# Patient Record
Sex: FEMALE | Race: WHITE | NOT HISPANIC OR LATINO | Employment: OTHER | URBAN - METROPOLITAN AREA
[De-identification: names, ages, dates, MRNs, and addresses within clinical notes are randomized per-mention and may not be internally consistent; named-entity substitution may affect disease eponyms.]

---

## 2020-12-14 RX ORDER — GLYBURIDE 1.25 MG/1
1.25 TABLET ORAL 2 TIMES DAILY WITH MEALS
Status: ON HOLD | COMMUNITY
End: 2021-01-03

## 2020-12-14 RX ORDER — SIMVASTATIN 10 MG
10 TABLET ORAL
COMMUNITY
End: 2022-04-21

## 2020-12-14 RX ORDER — LISINOPRIL 40 MG/1
40 TABLET ORAL EVERY EVENING
COMMUNITY
End: 2021-10-21 | Stop reason: CLARIF

## 2020-12-14 RX ORDER — TIOTROPIUM BROMIDE AND OLODATEROL 3.124; 2.736 UG/1; UG/1
2 SPRAY, METERED RESPIRATORY (INHALATION) DAILY
COMMUNITY

## 2020-12-14 RX ORDER — METOPROLOL TARTRATE 50 MG/1
50 TABLET, FILM COATED ORAL EVERY 12 HOURS SCHEDULED
COMMUNITY

## 2020-12-14 RX ORDER — AMLODIPINE BESYLATE 5 MG/1
5 TABLET ORAL DAILY
COMMUNITY

## 2020-12-14 RX ORDER — ASPIRIN 81 MG/1
81 TABLET, CHEWABLE ORAL DAILY
COMMUNITY
End: 2021-03-10 | Stop reason: CLARIF

## 2020-12-17 ENCOUNTER — ANESTHESIA EVENT (OUTPATIENT)
Dept: PERIOP | Facility: HOSPITAL | Age: 80
DRG: 331 | End: 2020-12-17
Payer: MEDICARE

## 2020-12-18 ENCOUNTER — ANESTHESIA (OUTPATIENT)
Dept: PERIOP | Facility: HOSPITAL | Age: 80
DRG: 331 | End: 2020-12-18
Payer: MEDICARE

## 2020-12-18 ENCOUNTER — HOSPITAL ENCOUNTER (INPATIENT)
Facility: HOSPITAL | Age: 80
LOS: 3 days | Discharge: HOME | DRG: 331 | End: 2020-12-21
Attending: SURGERY | Admitting: SURGERY
Payer: MEDICARE

## 2020-12-18 VITALS — HEART RATE: 75 BPM

## 2020-12-18 DIAGNOSIS — E11.9 TYPE 2 DIABETES MELLITUS WITHOUT COMPLICATION, WITHOUT LONG-TERM CURRENT USE OF INSULIN (HCC): ICD-10-CM

## 2020-12-18 DIAGNOSIS — C18.7 MALIGNANT NEOPLASM OF SIGMOID COLON (HCC): ICD-10-CM

## 2020-12-18 DIAGNOSIS — C26.9 GI MALIGNANCY (HCC): Primary | ICD-10-CM

## 2020-12-18 PROBLEM — Z99.81 OXYGEN DEPENDENT: Status: ACTIVE | Noted: 2020-12-18

## 2020-12-18 PROBLEM — J44.9 CHRONIC OBSTRUCTIVE PULMONARY DISEASE (HCC): Status: ACTIVE | Noted: 2020-12-18

## 2020-12-18 PROBLEM — I10 HTN (HYPERTENSION): Status: ACTIVE | Noted: 2020-12-18

## 2020-12-18 PROBLEM — E78.5 HYPERLIPIDEMIA: Status: ACTIVE | Noted: 2020-12-18

## 2020-12-18 PROBLEM — I25.10 CAD (CORONARY ARTERY DISEASE): Status: ACTIVE | Noted: 2020-12-18

## 2020-12-18 PROBLEM — E03.9 HYPOTHYROIDISM: Status: ACTIVE | Noted: 2020-12-18

## 2020-12-18 LAB
ABO GROUP BLD: NORMAL
ABO GROUP BLD: NORMAL
ANION GAP SERPL CALCULATED.3IONS-SCNC: 6 MMOL/L (ref 4–13)
APTT PPP: 32 SECONDS (ref 23–37)
ATRIAL RATE: 71 BPM
ATRIAL RATE: 72 BPM
BLD GP AB SCN SERPL QL: NEGATIVE
BUN SERPL-MCNC: 13 MG/DL (ref 5–25)
CALCIUM SERPL-MCNC: 9.7 MG/DL (ref 8.3–10.1)
CHLORIDE SERPL-SCNC: 103 MMOL/L (ref 100–108)
CO2 SERPL-SCNC: 30 MMOL/L (ref 21–32)
CREAT SERPL-MCNC: 0.82 MG/DL (ref 0.6–1.3)
ERYTHROCYTE [DISTWIDTH] IN BLOOD BY AUTOMATED COUNT: 13.6 % (ref 11.6–15.1)
FLUAV RNA RESP QL NAA+PROBE: NEGATIVE
FLUBV RNA RESP QL NAA+PROBE: NEGATIVE
GFR SERPL CREATININE-BSD FRML MDRD: 68 ML/MIN/1.73SQ M
GLUCOSE P FAST SERPL-MCNC: 161 MG/DL (ref 65–99)
GLUCOSE SERPL-MCNC: 131 MG/DL (ref 65–140)
GLUCOSE SERPL-MCNC: 161 MG/DL (ref 65–140)
GLUCOSE SERPL-MCNC: 181 MG/DL (ref 65–140)
HCT VFR BLD AUTO: 43.7 % (ref 34.8–46.1)
HGB BLD-MCNC: 13.7 G/DL (ref 11.5–15.4)
INR PPP: 0.96 (ref 0.84–1.19)
MCH RBC QN AUTO: 27 PG (ref 26.8–34.3)
MCHC RBC AUTO-ENTMCNC: 31.4 G/DL (ref 31.4–37.4)
MCV RBC AUTO: 86 FL (ref 82–98)
PLATELET # BLD AUTO: 289 THOUSANDS/UL (ref 149–390)
PMV BLD AUTO: 10.4 FL (ref 8.9–12.7)
POTASSIUM SERPL-SCNC: 3.8 MMOL/L (ref 3.5–5.3)
PROTHROMBIN TIME: 12.8 SECONDS (ref 11.6–14.5)
QRS AXIS: 44 DEGREES
QRS AXIS: 46 DEGREES
QRSD INTERVAL: 80 MS
QRSD INTERVAL: 82 MS
QT INTERVAL: 428 MS
QT INTERVAL: 430 MS
QTC INTERVAL: 455 MS
QTC INTERVAL: 467 MS
RBC # BLD AUTO: 5.07 MILLION/UL (ref 3.81–5.12)
RH BLD: NEGATIVE
RH BLD: NEGATIVE
RSV RNA RESP QL NAA+PROBE: NEGATIVE
SARS-COV-2 RNA RESP QL NAA+PROBE: NEGATIVE
SODIUM SERPL-SCNC: 139 MMOL/L (ref 136–145)
SPECIMEN EXPIRATION DATE: NORMAL
T WAVE AXIS: 51 DEGREES
T WAVE AXIS: 65 DEGREES
VENTRICULAR RATE: 68 BPM
VENTRICULAR RATE: 71 BPM
WBC # BLD AUTO: 8 THOUSAND/UL (ref 4.31–10.16)

## 2020-12-18 PROCEDURE — 88342 IMHCHEM/IMCYTCHM 1ST ANTB: CPT | Performed by: PATHOLOGY

## 2020-12-18 PROCEDURE — 44207 L COLECTOMY/COLOPROCTOSTOMY: CPT | Performed by: PHYSICIAN ASSISTANT

## 2020-12-18 PROCEDURE — 86850 RBC ANTIBODY SCREEN: CPT | Performed by: STUDENT IN AN ORGANIZED HEALTH CARE EDUCATION/TRAINING PROGRAM

## 2020-12-18 PROCEDURE — 0241U HB NFCT DS VIR RESP RNA 4 TRGT: CPT | Performed by: STUDENT IN AN ORGANIZED HEALTH CARE EDUCATION/TRAINING PROGRAM

## 2020-12-18 PROCEDURE — 85730 THROMBOPLASTIN TIME PARTIAL: CPT | Performed by: STUDENT IN AN ORGANIZED HEALTH CARE EDUCATION/TRAINING PROGRAM

## 2020-12-18 PROCEDURE — 88341 IMHCHEM/IMCYTCHM EA ADD ANTB: CPT | Performed by: PATHOLOGY

## 2020-12-18 PROCEDURE — 93010 ELECTROCARDIOGRAM REPORT: CPT | Performed by: INTERNAL MEDICINE

## 2020-12-18 PROCEDURE — 93005 ELECTROCARDIOGRAM TRACING: CPT

## 2020-12-18 PROCEDURE — 85610 PROTHROMBIN TIME: CPT | Performed by: STUDENT IN AN ORGANIZED HEALTH CARE EDUCATION/TRAINING PROGRAM

## 2020-12-18 PROCEDURE — 82948 REAGENT STRIP/BLOOD GLUCOSE: CPT

## 2020-12-18 PROCEDURE — 80048 BASIC METABOLIC PNL TOTAL CA: CPT | Performed by: STUDENT IN AN ORGANIZED HEALTH CARE EDUCATION/TRAINING PROGRAM

## 2020-12-18 PROCEDURE — 88309 TISSUE EXAM BY PATHOLOGIST: CPT | Performed by: PATHOLOGY

## 2020-12-18 PROCEDURE — 0DTG4ZZ RESECTION OF LEFT LARGE INTESTINE, PERCUTANEOUS ENDOSCOPIC APPROACH: ICD-10-PCS | Performed by: SURGERY

## 2020-12-18 PROCEDURE — 86901 BLOOD TYPING SEROLOGIC RH(D): CPT | Performed by: STUDENT IN AN ORGANIZED HEALTH CARE EDUCATION/TRAINING PROGRAM

## 2020-12-18 PROCEDURE — 86900 BLOOD TYPING SEROLOGIC ABO: CPT | Performed by: STUDENT IN AN ORGANIZED HEALTH CARE EDUCATION/TRAINING PROGRAM

## 2020-12-18 PROCEDURE — 85027 COMPLETE CBC AUTOMATED: CPT | Performed by: STUDENT IN AN ORGANIZED HEALTH CARE EDUCATION/TRAINING PROGRAM

## 2020-12-18 RX ORDER — MORPHINE SULFATE 10 MG/ML
2 INJECTION, SOLUTION INTRAMUSCULAR; INTRAVENOUS EVERY 4 HOURS PRN
Status: DISCONTINUED | OUTPATIENT
Start: 2020-12-18 | End: 2020-12-18

## 2020-12-18 RX ORDER — LABETALOL 20 MG/4 ML (5 MG/ML) INTRAVENOUS SYRINGE
10 EVERY 4 HOURS PRN
Status: DISCONTINUED | OUTPATIENT
Start: 2020-12-18 | End: 2020-12-21 | Stop reason: HOSPADM

## 2020-12-18 RX ORDER — SODIUM CHLORIDE AND POTASSIUM CHLORIDE .9; .15 G/100ML; G/100ML
100 SOLUTION INTRAVENOUS CONTINUOUS
Status: DISCONTINUED | OUTPATIENT
Start: 2020-12-18 | End: 2020-12-19

## 2020-12-18 RX ORDER — AMLODIPINE BESYLATE 5 MG/1
5 TABLET ORAL DAILY
Status: DISCONTINUED | OUTPATIENT
Start: 2020-12-19 | End: 2020-12-21 | Stop reason: HOSPADM

## 2020-12-18 RX ORDER — ASPIRIN 81 MG/1
81 TABLET, CHEWABLE ORAL DAILY
Status: DISCONTINUED | OUTPATIENT
Start: 2020-12-18 | End: 2020-12-18

## 2020-12-18 RX ORDER — ONDANSETRON 2 MG/ML
4 INJECTION INTRAMUSCULAR; INTRAVENOUS EVERY 6 HOURS PRN
Status: DISCONTINUED | OUTPATIENT
Start: 2020-12-18 | End: 2020-12-21 | Stop reason: HOSPADM

## 2020-12-18 RX ORDER — CALCIUM CARBONATE 200(500)MG
1000 TABLET,CHEWABLE ORAL DAILY PRN
Status: DISCONTINUED | OUTPATIENT
Start: 2020-12-18 | End: 2020-12-21 | Stop reason: HOSPADM

## 2020-12-18 RX ORDER — MAGNESIUM HYDROXIDE 1200 MG/15ML
LIQUID ORAL AS NEEDED
Status: DISCONTINUED | OUTPATIENT
Start: 2020-12-18 | End: 2020-12-18 | Stop reason: HOSPADM

## 2020-12-18 RX ORDER — FENTANYL CITRATE/PF 50 MCG/ML
25 SYRINGE (ML) INJECTION
Status: COMPLETED | OUTPATIENT
Start: 2020-12-18 | End: 2020-12-18

## 2020-12-18 RX ORDER — OXYCODONE HYDROCHLORIDE AND ACETAMINOPHEN 5; 325 MG/1; MG/1
2 TABLET ORAL EVERY 4 HOURS PRN
Status: DISCONTINUED | OUTPATIENT
Start: 2020-12-18 | End: 2020-12-18

## 2020-12-18 RX ORDER — CEFAZOLIN SODIUM 2 G/50ML
2000 SOLUTION INTRAVENOUS ONCE
Status: COMPLETED | OUTPATIENT
Start: 2020-12-18 | End: 2020-12-18

## 2020-12-18 RX ORDER — SODIUM CHLORIDE, SODIUM LACTATE, POTASSIUM CHLORIDE, CALCIUM CHLORIDE 600; 310; 30; 20 MG/100ML; MG/100ML; MG/100ML; MG/100ML
50 INJECTION, SOLUTION INTRAVENOUS CONTINUOUS
Status: DISCONTINUED | OUTPATIENT
Start: 2020-12-18 | End: 2020-12-18

## 2020-12-18 RX ORDER — ACETAMINOPHEN 325 MG/1
650 TABLET ORAL EVERY 6 HOURS SCHEDULED
Status: DISCONTINUED | OUTPATIENT
Start: 2020-12-18 | End: 2020-12-21 | Stop reason: HOSPADM

## 2020-12-18 RX ORDER — OXYCODONE HYDROCHLORIDE 5 MG/1
5 TABLET ORAL EVERY 4 HOURS PRN
Status: DISCONTINUED | OUTPATIENT
Start: 2020-12-18 | End: 2020-12-21 | Stop reason: HOSPADM

## 2020-12-18 RX ORDER — HEPARIN SODIUM 5000 [USP'U]/ML
5000 INJECTION, SOLUTION INTRAVENOUS; SUBCUTANEOUS EVERY 8 HOURS SCHEDULED
Status: DISCONTINUED | OUTPATIENT
Start: 2020-12-18 | End: 2020-12-21 | Stop reason: HOSPADM

## 2020-12-18 RX ORDER — PRAVASTATIN SODIUM 10 MG
10 TABLET ORAL
Status: DISCONTINUED | OUTPATIENT
Start: 2020-12-18 | End: 2020-12-18

## 2020-12-18 RX ORDER — METOPROLOL TARTRATE 50 MG/1
50 TABLET, FILM COATED ORAL EVERY 12 HOURS SCHEDULED
Status: DISCONTINUED | OUTPATIENT
Start: 2020-12-19 | End: 2020-12-21 | Stop reason: HOSPADM

## 2020-12-18 RX ORDER — FENTANYL CITRATE 50 UG/ML
INJECTION, SOLUTION INTRAMUSCULAR; INTRAVENOUS AS NEEDED
Status: DISCONTINUED | OUTPATIENT
Start: 2020-12-18 | End: 2020-12-18

## 2020-12-18 RX ORDER — AMLODIPINE BESYLATE 5 MG/1
5 TABLET ORAL DAILY
Status: DISCONTINUED | OUTPATIENT
Start: 2020-12-18 | End: 2020-12-18

## 2020-12-18 RX ORDER — ONDANSETRON 2 MG/ML
INJECTION INTRAMUSCULAR; INTRAVENOUS AS NEEDED
Status: DISCONTINUED | OUTPATIENT
Start: 2020-12-18 | End: 2020-12-18

## 2020-12-18 RX ORDER — LABETALOL 20 MG/4 ML (5 MG/ML) INTRAVENOUS SYRINGE
AS NEEDED
Status: DISCONTINUED | OUTPATIENT
Start: 2020-12-18 | End: 2020-12-18

## 2020-12-18 RX ORDER — GLYBURIDE 1.25 MG/1
1.25 TABLET ORAL 2 TIMES DAILY WITH MEALS
Status: DISCONTINUED | OUTPATIENT
Start: 2020-12-19 | End: 2020-12-18

## 2020-12-18 RX ORDER — ASPIRIN 81 MG/1
81 TABLET, CHEWABLE ORAL DAILY
Status: DISCONTINUED | OUTPATIENT
Start: 2020-12-19 | End: 2020-12-21 | Stop reason: HOSPADM

## 2020-12-18 RX ORDER — GLYBURIDE 1.25 MG/1
1.25 TABLET ORAL 2 TIMES DAILY WITH MEALS
Status: DISCONTINUED | OUTPATIENT
Start: 2020-12-18 | End: 2020-12-18

## 2020-12-18 RX ORDER — HYDROMORPHONE HCL/PF 1 MG/ML
0.2 SYRINGE (ML) INJECTION
Status: DISCONTINUED | OUTPATIENT
Start: 2020-12-18 | End: 2020-12-18

## 2020-12-18 RX ORDER — METOPROLOL TARTRATE 5 MG/5ML
5 INJECTION INTRAVENOUS EVERY 6 HOURS PRN
Status: DISCONTINUED | OUTPATIENT
Start: 2020-12-18 | End: 2020-12-18

## 2020-12-18 RX ORDER — DEXAMETHASONE SODIUM PHOSPHATE 10 MG/ML
INJECTION, SOLUTION INTRAMUSCULAR; INTRAVENOUS AS NEEDED
Status: DISCONTINUED | OUTPATIENT
Start: 2020-12-18 | End: 2020-12-18

## 2020-12-18 RX ORDER — OXYCODONE HYDROCHLORIDE AND ACETAMINOPHEN 5; 325 MG/1; MG/1
1 TABLET ORAL EVERY 4 HOURS PRN
Status: DISCONTINUED | OUTPATIENT
Start: 2020-12-18 | End: 2020-12-18

## 2020-12-18 RX ORDER — ALBUTEROL SULFATE 2.5 MG/3ML
2.5 SOLUTION RESPIRATORY (INHALATION) ONCE AS NEEDED
Status: DISCONTINUED | OUTPATIENT
Start: 2020-12-18 | End: 2020-12-18

## 2020-12-18 RX ORDER — LISINOPRIL 20 MG/1
40 TABLET ORAL EVERY EVENING
Status: DISCONTINUED | OUTPATIENT
Start: 2020-12-19 | End: 2020-12-18

## 2020-12-18 RX ORDER — PRAVASTATIN SODIUM 10 MG
10 TABLET ORAL
Status: DISCONTINUED | OUTPATIENT
Start: 2020-12-19 | End: 2020-12-21 | Stop reason: HOSPADM

## 2020-12-18 RX ORDER — ROCURONIUM BROMIDE 10 MG/ML
INJECTION, SOLUTION INTRAVENOUS AS NEEDED
Status: DISCONTINUED | OUTPATIENT
Start: 2020-12-18 | End: 2020-12-18

## 2020-12-18 RX ORDER — PROPOFOL 10 MG/ML
INJECTION, EMULSION INTRAVENOUS AS NEEDED
Status: DISCONTINUED | OUTPATIENT
Start: 2020-12-18 | End: 2020-12-18

## 2020-12-18 RX ORDER — LISINOPRIL 20 MG/1
40 TABLET ORAL EVERY EVENING
Status: DISCONTINUED | OUTPATIENT
Start: 2020-12-18 | End: 2020-12-18

## 2020-12-18 RX ORDER — LIDOCAINE HYDROCHLORIDE 10 MG/ML
0.5 INJECTION, SOLUTION EPIDURAL; INFILTRATION; INTRACAUDAL; PERINEURAL ONCE AS NEEDED
Status: COMPLETED | OUTPATIENT
Start: 2020-12-18 | End: 2020-12-18

## 2020-12-18 RX ORDER — OXYCODONE HYDROCHLORIDE 5 MG/1
10 TABLET ORAL EVERY 4 HOURS PRN
Status: DISCONTINUED | OUTPATIENT
Start: 2020-12-18 | End: 2020-12-21 | Stop reason: HOSPADM

## 2020-12-18 RX ORDER — HEPARIN SODIUM 5000 [USP'U]/ML
INJECTION, SOLUTION INTRAVENOUS; SUBCUTANEOUS AS NEEDED
Status: DISCONTINUED | OUTPATIENT
Start: 2020-12-18 | End: 2020-12-18

## 2020-12-18 RX ORDER — HYDROMORPHONE HCL/PF 1 MG/ML
0.5 SYRINGE (ML) INJECTION
Status: DISCONTINUED | OUTPATIENT
Start: 2020-12-18 | End: 2020-12-21 | Stop reason: HOSPADM

## 2020-12-18 RX ORDER — ONDANSETRON 2 MG/ML
4 INJECTION INTRAMUSCULAR; INTRAVENOUS ONCE AS NEEDED
Status: DISCONTINUED | OUTPATIENT
Start: 2020-12-18 | End: 2020-12-18

## 2020-12-18 RX ORDER — SODIUM CHLORIDE 9 MG/ML
INJECTION, SOLUTION INTRAVENOUS CONTINUOUS PRN
Status: DISCONTINUED | OUTPATIENT
Start: 2020-12-18 | End: 2020-12-18

## 2020-12-18 RX ADMIN — LABETALOL 20 MG/4 ML (5 MG/ML) INTRAVENOUS SYRINGE 7.5 MG: at 10:46

## 2020-12-18 RX ADMIN — HYDROMORPHONE HYDROCHLORIDE 0.5 MG: 1 INJECTION, SOLUTION INTRAMUSCULAR; INTRAVENOUS; SUBCUTANEOUS at 18:13

## 2020-12-18 RX ADMIN — OXYCODONE HYDROCHLORIDE 10 MG: 5 TABLET ORAL at 15:29

## 2020-12-18 RX ADMIN — ACETAMINOPHEN 650 MG: 325 TABLET, FILM COATED ORAL at 22:05

## 2020-12-18 RX ADMIN — FENTANYL CITRATE 50 MCG: 50 INJECTION INTRAMUSCULAR; INTRAVENOUS at 11:08

## 2020-12-18 RX ADMIN — ONDANSETRON 4 MG: 2 INJECTION INTRAMUSCULAR; INTRAVENOUS at 11:56

## 2020-12-18 RX ADMIN — FENTANYL CITRATE 25 MCG: 50 INJECTION INTRAMUSCULAR; INTRAVENOUS at 12:22

## 2020-12-18 RX ADMIN — ROCURONIUM BROMIDE 50 MG: 50 INJECTION, SOLUTION INTRAVENOUS at 10:11

## 2020-12-18 RX ADMIN — LIDOCAINE HYDROCHLORIDE 0.25 ML: 10 INJECTION, SOLUTION EPIDURAL; INFILTRATION; INTRACAUDAL; PERINEURAL at 08:39

## 2020-12-18 RX ADMIN — SUGAMMADEX 200 MG: 100 INJECTION, SOLUTION INTRAVENOUS at 12:25

## 2020-12-18 RX ADMIN — SODIUM CHLORIDE, SODIUM LACTATE, POTASSIUM CHLORIDE, AND CALCIUM CHLORIDE: .6; .31; .03; .02 INJECTION, SOLUTION INTRAVENOUS at 10:06

## 2020-12-18 RX ADMIN — Medication 25 MCG: at 13:35

## 2020-12-18 RX ADMIN — FENTANYL CITRATE 25 MCG: 50 INJECTION INTRAMUSCULAR; INTRAVENOUS at 12:31

## 2020-12-18 RX ADMIN — FENTANYL CITRATE 50 MCG: 50 INJECTION INTRAMUSCULAR; INTRAVENOUS at 12:42

## 2020-12-18 RX ADMIN — HYDROMORPHONE HYDROCHLORIDE 0.2 MG: 1 INJECTION, SOLUTION INTRAMUSCULAR; INTRAVENOUS; SUBCUTANEOUS at 13:52

## 2020-12-18 RX ADMIN — PROPOFOL 80 MG: 10 INJECTION, EMULSION INTRAVENOUS at 10:11

## 2020-12-18 RX ADMIN — SODIUM CHLORIDE, SODIUM LACTATE, POTASSIUM CHLORIDE, AND CALCIUM CHLORIDE 50 ML/HR: .6; .31; .03; .02 INJECTION, SOLUTION INTRAVENOUS at 08:39

## 2020-12-18 RX ADMIN — FENTANYL CITRATE 50 MCG: 50 INJECTION INTRAMUSCULAR; INTRAVENOUS at 10:11

## 2020-12-18 RX ADMIN — HEPARIN SODIUM 5000 UNITS: 5000 INJECTION INTRAVENOUS; SUBCUTANEOUS at 10:46

## 2020-12-18 RX ADMIN — SALMETEROL XINAFOATE 1 PUFF: 50 POWDER, METERED ORAL; RESPIRATORY (INHALATION) at 22:06

## 2020-12-18 RX ADMIN — METRONIDAZOLE 500 MG: 500 INJECTION, SOLUTION INTRAVENOUS at 08:57

## 2020-12-18 RX ADMIN — SODIUM CHLORIDE: 0.9 INJECTION, SOLUTION INTRAVENOUS at 10:15

## 2020-12-18 RX ADMIN — Medication 25 MCG: at 13:21

## 2020-12-18 RX ADMIN — Medication 25 MCG: at 13:05

## 2020-12-18 RX ADMIN — HEPARIN SODIUM 5000 UNITS: 5000 INJECTION INTRAVENOUS; SUBCUTANEOUS at 22:06

## 2020-12-18 RX ADMIN — ACETAMINOPHEN 650 MG: 325 TABLET, FILM COATED ORAL at 15:30

## 2020-12-18 RX ADMIN — ROCURONIUM BROMIDE 20 MG: 50 INJECTION, SOLUTION INTRAVENOUS at 10:57

## 2020-12-18 RX ADMIN — HYDROMORPHONE HYDROCHLORIDE 0.2 MG: 1 INJECTION, SOLUTION INTRAMUSCULAR; INTRAVENOUS; SUBCUTANEOUS at 14:01

## 2020-12-18 RX ADMIN — DEXAMETHASONE SODIUM PHOSPHATE 4 MG: 10 INJECTION, SOLUTION INTRAMUSCULAR; INTRAVENOUS at 10:15

## 2020-12-18 RX ADMIN — SODIUM CHLORIDE AND POTASSIUM CHLORIDE 100 ML/HR: .9; .15 SOLUTION INTRAVENOUS at 22:37

## 2020-12-18 RX ADMIN — SODIUM CHLORIDE AND POTASSIUM CHLORIDE 100 ML/HR: .9; .15 SOLUTION INTRAVENOUS at 13:24

## 2020-12-18 RX ADMIN — Medication 25 MCG: at 13:40

## 2020-12-18 RX ADMIN — LIDOCAINE HYDROCHLORIDE 50 MG: 20 INJECTION, SOLUTION INTRAVENOUS at 10:11

## 2020-12-18 RX ADMIN — CEFAZOLIN SODIUM 1000 MG: 2 SOLUTION INTRAVENOUS at 10:20

## 2020-12-19 LAB
ANION GAP SERPL CALCULATED.3IONS-SCNC: 4 MMOL/L (ref 4–13)
BASOPHILS # BLD MANUAL: 0 THOUSAND/UL (ref 0–0.1)
BASOPHILS NFR MAR MANUAL: 0 % (ref 0–1)
BUN SERPL-MCNC: 14 MG/DL (ref 5–25)
CALCIUM SERPL-MCNC: 8.6 MG/DL (ref 8.3–10.1)
CHLORIDE SERPL-SCNC: 109 MMOL/L (ref 100–108)
CO2 SERPL-SCNC: 27 MMOL/L (ref 21–32)
CREAT SERPL-MCNC: 0.78 MG/DL (ref 0.6–1.3)
EOSINOPHIL # BLD MANUAL: 0 THOUSAND/UL (ref 0–0.4)
EOSINOPHIL NFR BLD MANUAL: 0 % (ref 0–6)
ERYTHROCYTE [DISTWIDTH] IN BLOOD BY AUTOMATED COUNT: 14.2 % (ref 11.6–15.1)
GFR SERPL CREATININE-BSD FRML MDRD: 73 ML/MIN/1.73SQ M
GLUCOSE SERPL-MCNC: 150 MG/DL (ref 65–140)
GLUCOSE SERPL-MCNC: 155 MG/DL (ref 65–140)
GLUCOSE SERPL-MCNC: 156 MG/DL (ref 65–140)
GLUCOSE SERPL-MCNC: 162 MG/DL (ref 65–140)
GLUCOSE SERPL-MCNC: 191 MG/DL (ref 65–140)
GLUCOSE SERPL-MCNC: 214 MG/DL (ref 65–140)
HCT VFR BLD AUTO: 38.1 % (ref 34.8–46.1)
HGB BLD-MCNC: 11.8 G/DL (ref 11.5–15.4)
LYMPHOCYTES # BLD AUTO: 0.76 THOUSAND/UL (ref 0.6–4.47)
LYMPHOCYTES # BLD AUTO: 7 % (ref 14–44)
MAGNESIUM SERPL-MCNC: 2.3 MG/DL (ref 1.6–2.6)
MCH RBC QN AUTO: 27.4 PG (ref 26.8–34.3)
MCHC RBC AUTO-ENTMCNC: 31 G/DL (ref 31.4–37.4)
MCV RBC AUTO: 89 FL (ref 82–98)
METAMYELOCYTES NFR BLD MANUAL: 1 % (ref 0–1)
MONOCYTES # BLD AUTO: 0.11 THOUSAND/UL (ref 0–1.22)
MONOCYTES NFR BLD: 1 % (ref 4–12)
NEUTROPHILS # BLD MANUAL: 9.31 THOUSAND/UL (ref 1.85–7.62)
NEUTS BAND NFR BLD MANUAL: 11 % (ref 0–8)
NEUTS SEG NFR BLD AUTO: 75 % (ref 43–75)
NRBC BLD AUTO-RTO: 0 /100 WBCS
PHOSPHATE SERPL-MCNC: 3 MG/DL (ref 2.3–4.1)
PLATELET # BLD AUTO: 212 THOUSANDS/UL (ref 149–390)
PLATELET BLD QL SMEAR: ADEQUATE
PMV BLD AUTO: 10.7 FL (ref 8.9–12.7)
POTASSIUM SERPL-SCNC: 4.1 MMOL/L (ref 3.5–5.3)
PROMYELOCYTES NFR BLD MANUAL: 2 % (ref 0–0)
RBC # BLD AUTO: 4.3 MILLION/UL (ref 3.81–5.12)
RBC MORPH BLD: NORMAL
SODIUM SERPL-SCNC: 140 MMOL/L (ref 136–145)
VARIANT LYMPHS # BLD AUTO: 3 %
WBC # BLD AUTO: 10.82 THOUSAND/UL (ref 4.31–10.16)

## 2020-12-19 PROCEDURE — 97163 PT EVAL HIGH COMPLEX 45 MIN: CPT

## 2020-12-19 PROCEDURE — 99222 1ST HOSP IP/OBS MODERATE 55: CPT | Performed by: FAMILY MEDICINE

## 2020-12-19 PROCEDURE — 80048 BASIC METABOLIC PNL TOTAL CA: CPT | Performed by: SURGERY

## 2020-12-19 PROCEDURE — 83735 ASSAY OF MAGNESIUM: CPT | Performed by: SURGERY

## 2020-12-19 PROCEDURE — 82948 REAGENT STRIP/BLOOD GLUCOSE: CPT

## 2020-12-19 PROCEDURE — 85007 BL SMEAR W/DIFF WBC COUNT: CPT | Performed by: SURGERY

## 2020-12-19 PROCEDURE — 84100 ASSAY OF PHOSPHORUS: CPT | Performed by: SURGERY

## 2020-12-19 PROCEDURE — 85027 COMPLETE CBC AUTOMATED: CPT | Performed by: SURGERY

## 2020-12-19 RX ORDER — DEXTROSE, SODIUM CHLORIDE, AND POTASSIUM CHLORIDE 5; .45; .15 G/100ML; G/100ML; G/100ML
50 INJECTION INTRAVENOUS CONTINUOUS
Status: DISCONTINUED | OUTPATIENT
Start: 2020-12-19 | End: 2020-12-21

## 2020-12-19 RX ADMIN — INSULIN LISPRO 1 UNITS: 100 INJECTION, SOLUTION INTRAVENOUS; SUBCUTANEOUS at 00:39

## 2020-12-19 RX ADMIN — PRAVASTATIN SODIUM 10 MG: 10 TABLET ORAL at 17:43

## 2020-12-19 RX ADMIN — SALMETEROL XINAFOATE 1 PUFF: 50 POWDER, METERED ORAL; RESPIRATORY (INHALATION) at 21:40

## 2020-12-19 RX ADMIN — HEPARIN SODIUM 5000 UNITS: 5000 INJECTION INTRAVENOUS; SUBCUTANEOUS at 15:02

## 2020-12-19 RX ADMIN — AMLODIPINE BESYLATE 5 MG: 5 TABLET ORAL at 09:31

## 2020-12-19 RX ADMIN — HEPARIN SODIUM 5000 UNITS: 5000 INJECTION INTRAVENOUS; SUBCUTANEOUS at 21:39

## 2020-12-19 RX ADMIN — ACETAMINOPHEN 650 MG: 325 TABLET, FILM COATED ORAL at 23:43

## 2020-12-19 RX ADMIN — ACETAMINOPHEN 650 MG: 325 TABLET, FILM COATED ORAL at 05:44

## 2020-12-19 RX ADMIN — SALMETEROL XINAFOATE 1 PUFF: 50 POWDER, METERED ORAL; RESPIRATORY (INHALATION) at 09:26

## 2020-12-19 RX ADMIN — OXYCODONE HYDROCHLORIDE 5 MG: 5 TABLET ORAL at 09:35

## 2020-12-19 RX ADMIN — ACETAMINOPHEN 650 MG: 325 TABLET, FILM COATED ORAL at 11:37

## 2020-12-19 RX ADMIN — DEXTROSE, SODIUM CHLORIDE, AND POTASSIUM CHLORIDE 100 ML/HR: 5; .45; .15 INJECTION INTRAVENOUS at 09:35

## 2020-12-19 RX ADMIN — ACETAMINOPHEN 650 MG: 325 TABLET, FILM COATED ORAL at 17:44

## 2020-12-19 RX ADMIN — INSULIN LISPRO 1 UNITS: 100 INJECTION, SOLUTION INTRAVENOUS; SUBCUTANEOUS at 17:48

## 2020-12-19 RX ADMIN — METOPROLOL TARTRATE 50 MG: 50 TABLET, FILM COATED ORAL at 09:31

## 2020-12-19 RX ADMIN — METOPROLOL TARTRATE 50 MG: 50 TABLET, FILM COATED ORAL at 21:39

## 2020-12-19 RX ADMIN — HYDROMORPHONE HYDROCHLORIDE 0.5 MG: 1 INJECTION, SOLUTION INTRAMUSCULAR; INTRAVENOUS; SUBCUTANEOUS at 17:42

## 2020-12-19 RX ADMIN — HEPARIN SODIUM 5000 UNITS: 5000 INJECTION INTRAVENOUS; SUBCUTANEOUS at 05:44

## 2020-12-19 RX ADMIN — INSULIN LISPRO 1 UNITS: 100 INJECTION, SOLUTION INTRAVENOUS; SUBCUTANEOUS at 07:02

## 2020-12-19 RX ADMIN — DEXTROSE, SODIUM CHLORIDE, AND POTASSIUM CHLORIDE 100 ML/HR: 5; .45; .15 INJECTION INTRAVENOUS at 19:59

## 2020-12-19 RX ADMIN — TIOTROPIUM BROMIDE 18 MCG: 18 CAPSULE ORAL; RESPIRATORY (INHALATION) at 09:26

## 2020-12-19 RX ADMIN — INSULIN LISPRO 1 UNITS: 100 INJECTION, SOLUTION INTRAVENOUS; SUBCUTANEOUS at 11:38

## 2020-12-19 RX ADMIN — ASPIRIN 81 MG CHEWABLE TABLET 81 MG: 81 TABLET CHEWABLE at 09:31

## 2020-12-20 LAB
ANION GAP SERPL CALCULATED.3IONS-SCNC: 3 MMOL/L (ref 4–13)
BASOPHILS # BLD AUTO: 0.05 THOUSANDS/ΜL (ref 0–0.1)
BASOPHILS NFR BLD AUTO: 0 % (ref 0–1)
BUN SERPL-MCNC: 11 MG/DL (ref 5–25)
CALCIUM SERPL-MCNC: 8.9 MG/DL (ref 8.3–10.1)
CHLORIDE SERPL-SCNC: 104 MMOL/L (ref 100–108)
CO2 SERPL-SCNC: 27 MMOL/L (ref 21–32)
CREAT SERPL-MCNC: 0.71 MG/DL (ref 0.6–1.3)
EOSINOPHIL # BLD AUTO: 0.07 THOUSAND/ΜL (ref 0–0.61)
EOSINOPHIL NFR BLD AUTO: 1 % (ref 0–6)
ERYTHROCYTE [DISTWIDTH] IN BLOOD BY AUTOMATED COUNT: 14.3 % (ref 11.6–15.1)
GFR SERPL CREATININE-BSD FRML MDRD: 81 ML/MIN/1.73SQ M
GLUCOSE SERPL-MCNC: 127 MG/DL (ref 65–140)
GLUCOSE SERPL-MCNC: 161 MG/DL (ref 65–140)
GLUCOSE SERPL-MCNC: 161 MG/DL (ref 65–140)
GLUCOSE SERPL-MCNC: 164 MG/DL (ref 65–140)
HCT VFR BLD AUTO: 36.7 % (ref 34.8–46.1)
HGB BLD-MCNC: 11.4 G/DL (ref 11.5–15.4)
IMM GRANULOCYTES # BLD AUTO: 0.09 THOUSAND/UL (ref 0–0.2)
IMM GRANULOCYTES NFR BLD AUTO: 1 % (ref 0–2)
LYMPHOCYTES # BLD AUTO: 1.91 THOUSANDS/ΜL (ref 0.6–4.47)
LYMPHOCYTES NFR BLD AUTO: 13 % (ref 14–44)
MAGNESIUM SERPL-MCNC: 2.1 MG/DL (ref 1.6–2.6)
MCH RBC QN AUTO: 27.5 PG (ref 26.8–34.3)
MCHC RBC AUTO-ENTMCNC: 31.1 G/DL (ref 31.4–37.4)
MCV RBC AUTO: 88 FL (ref 82–98)
MONOCYTES # BLD AUTO: 0.68 THOUSAND/ΜL (ref 0.17–1.22)
MONOCYTES NFR BLD AUTO: 5 % (ref 4–12)
NEUTROPHILS # BLD AUTO: 11.63 THOUSANDS/ΜL (ref 1.85–7.62)
NEUTS SEG NFR BLD AUTO: 80 % (ref 43–75)
NRBC BLD AUTO-RTO: 0 /100 WBCS
PLATELET # BLD AUTO: 205 THOUSANDS/UL (ref 149–390)
PMV BLD AUTO: 10.4 FL (ref 8.9–12.7)
POTASSIUM SERPL-SCNC: 4.6 MMOL/L (ref 3.5–5.3)
RBC # BLD AUTO: 4.15 MILLION/UL (ref 3.81–5.12)
SODIUM SERPL-SCNC: 134 MMOL/L (ref 136–145)
WBC # BLD AUTO: 14.43 THOUSAND/UL (ref 4.31–10.16)

## 2020-12-20 PROCEDURE — 83735 ASSAY OF MAGNESIUM: CPT | Performed by: SURGERY

## 2020-12-20 PROCEDURE — 82948 REAGENT STRIP/BLOOD GLUCOSE: CPT

## 2020-12-20 PROCEDURE — 99232 SBSQ HOSP IP/OBS MODERATE 35: CPT | Performed by: FAMILY MEDICINE

## 2020-12-20 PROCEDURE — 85025 COMPLETE CBC W/AUTO DIFF WBC: CPT | Performed by: SURGERY

## 2020-12-20 PROCEDURE — 80048 BASIC METABOLIC PNL TOTAL CA: CPT | Performed by: SURGERY

## 2020-12-20 RX ORDER — METHOCARBAMOL 500 MG/1
500 TABLET, FILM COATED ORAL EVERY 6 HOURS SCHEDULED
Status: DISCONTINUED | OUTPATIENT
Start: 2020-12-20 | End: 2020-12-21 | Stop reason: HOSPADM

## 2020-12-20 RX ORDER — GABAPENTIN 100 MG/1
100 CAPSULE ORAL 3 TIMES DAILY
Status: DISCONTINUED | OUTPATIENT
Start: 2020-12-20 | End: 2020-12-21 | Stop reason: HOSPADM

## 2020-12-20 RX ADMIN — OXYCODONE HYDROCHLORIDE 5 MG: 5 TABLET ORAL at 21:56

## 2020-12-20 RX ADMIN — ACETAMINOPHEN 650 MG: 325 TABLET, FILM COATED ORAL at 23:25

## 2020-12-20 RX ADMIN — SALMETEROL XINAFOATE 1 PUFF: 50 POWDER, METERED ORAL; RESPIRATORY (INHALATION) at 08:29

## 2020-12-20 RX ADMIN — AMLODIPINE BESYLATE 5 MG: 5 TABLET ORAL at 08:32

## 2020-12-20 RX ADMIN — METHOCARBAMOL 500 MG: 500 TABLET ORAL at 17:42

## 2020-12-20 RX ADMIN — ACETAMINOPHEN 650 MG: 325 TABLET, FILM COATED ORAL at 12:33

## 2020-12-20 RX ADMIN — SALMETEROL XINAFOATE 1 PUFF: 50 POWDER, METERED ORAL; RESPIRATORY (INHALATION) at 23:24

## 2020-12-20 RX ADMIN — HEPARIN SODIUM 5000 UNITS: 5000 INJECTION INTRAVENOUS; SUBCUTANEOUS at 21:56

## 2020-12-20 RX ADMIN — GABAPENTIN 100 MG: 100 CAPSULE ORAL at 21:56

## 2020-12-20 RX ADMIN — OXYCODONE HYDROCHLORIDE 10 MG: 5 TABLET ORAL at 02:47

## 2020-12-20 RX ADMIN — ASPIRIN 81 MG CHEWABLE TABLET 81 MG: 81 TABLET CHEWABLE at 08:33

## 2020-12-20 RX ADMIN — TIOTROPIUM BROMIDE 18 MCG: 18 CAPSULE ORAL; RESPIRATORY (INHALATION) at 08:28

## 2020-12-20 RX ADMIN — METHOCARBAMOL 500 MG: 500 TABLET ORAL at 12:33

## 2020-12-20 RX ADMIN — METHOCARBAMOL 500 MG: 500 TABLET ORAL at 23:24

## 2020-12-20 RX ADMIN — HEPARIN SODIUM 5000 UNITS: 5000 INJECTION INTRAVENOUS; SUBCUTANEOUS at 15:21

## 2020-12-20 RX ADMIN — HEPARIN SODIUM 5000 UNITS: 5000 INJECTION INTRAVENOUS; SUBCUTANEOUS at 05:26

## 2020-12-20 RX ADMIN — METHOCARBAMOL 500 MG: 500 TABLET ORAL at 08:33

## 2020-12-20 RX ADMIN — DEXTROSE, SODIUM CHLORIDE, AND POTASSIUM CHLORIDE 100 ML/HR: 5; .45; .15 INJECTION INTRAVENOUS at 05:31

## 2020-12-20 RX ADMIN — PRAVASTATIN SODIUM 10 MG: 10 TABLET ORAL at 17:42

## 2020-12-20 RX ADMIN — METOPROLOL TARTRATE 50 MG: 50 TABLET, FILM COATED ORAL at 21:59

## 2020-12-20 RX ADMIN — INSULIN LISPRO 1 UNITS: 100 INJECTION, SOLUTION INTRAVENOUS; SUBCUTANEOUS at 08:30

## 2020-12-20 RX ADMIN — GABAPENTIN 100 MG: 100 CAPSULE ORAL at 08:33

## 2020-12-20 RX ADMIN — ACETAMINOPHEN 650 MG: 325 TABLET, FILM COATED ORAL at 05:26

## 2020-12-20 RX ADMIN — INSULIN LISPRO 1 UNITS: 100 INJECTION, SOLUTION INTRAVENOUS; SUBCUTANEOUS at 17:42

## 2020-12-20 RX ADMIN — METOPROLOL TARTRATE 50 MG: 50 TABLET, FILM COATED ORAL at 08:34

## 2020-12-20 RX ADMIN — DEXTROSE, SODIUM CHLORIDE, AND POTASSIUM CHLORIDE 75 ML/HR: 5; .45; .15 INJECTION INTRAVENOUS at 19:16

## 2020-12-20 RX ADMIN — ACETAMINOPHEN 650 MG: 325 TABLET, FILM COATED ORAL at 17:42

## 2020-12-20 RX ADMIN — GABAPENTIN 100 MG: 100 CAPSULE ORAL at 15:22

## 2020-12-21 VITALS
OXYGEN SATURATION: 90 % | BODY MASS INDEX: 25.13 KG/M2 | DIASTOLIC BLOOD PRESSURE: 74 MMHG | SYSTOLIC BLOOD PRESSURE: 146 MMHG | TEMPERATURE: 98.2 F | HEART RATE: 86 BPM | HEIGHT: 60 IN | RESPIRATION RATE: 20 BRPM | WEIGHT: 128 LBS

## 2020-12-21 LAB
ANION GAP SERPL CALCULATED.3IONS-SCNC: 7 MMOL/L (ref 4–13)
BUN SERPL-MCNC: 8 MG/DL (ref 5–25)
CALCIUM SERPL-MCNC: 8.6 MG/DL (ref 8.3–10.1)
CHLORIDE SERPL-SCNC: 108 MMOL/L (ref 100–108)
CO2 SERPL-SCNC: 23 MMOL/L (ref 21–32)
CREAT SERPL-MCNC: 0.58 MG/DL (ref 0.6–1.3)
ERYTHROCYTE [DISTWIDTH] IN BLOOD BY AUTOMATED COUNT: 14.3 % (ref 11.6–15.1)
GFR SERPL CREATININE-BSD FRML MDRD: 88 ML/MIN/1.73SQ M
GLUCOSE SERPL-MCNC: 129 MG/DL (ref 65–140)
GLUCOSE SERPL-MCNC: 138 MG/DL (ref 65–140)
GLUCOSE SERPL-MCNC: 148 MG/DL (ref 65–140)
HCT VFR BLD AUTO: 37.6 % (ref 34.8–46.1)
HGB BLD-MCNC: 11.8 G/DL (ref 11.5–15.4)
MCH RBC QN AUTO: 27.2 PG (ref 26.8–34.3)
MCHC RBC AUTO-ENTMCNC: 31.4 G/DL (ref 31.4–37.4)
MCV RBC AUTO: 87 FL (ref 82–98)
PLATELET # BLD AUTO: 231 THOUSANDS/UL (ref 149–390)
PMV BLD AUTO: 10.8 FL (ref 8.9–12.7)
POTASSIUM SERPL-SCNC: 4 MMOL/L (ref 3.5–5.3)
RBC # BLD AUTO: 4.34 MILLION/UL (ref 3.81–5.12)
SODIUM SERPL-SCNC: 138 MMOL/L (ref 136–145)
WBC # BLD AUTO: 14.02 THOUSAND/UL (ref 4.31–10.16)

## 2020-12-21 PROCEDURE — 85027 COMPLETE CBC AUTOMATED: CPT | Performed by: SURGERY

## 2020-12-21 PROCEDURE — 97535 SELF CARE MNGMENT TRAINING: CPT

## 2020-12-21 PROCEDURE — 82948 REAGENT STRIP/BLOOD GLUCOSE: CPT

## 2020-12-21 PROCEDURE — 80048 BASIC METABOLIC PNL TOTAL CA: CPT | Performed by: SURGERY

## 2020-12-21 PROCEDURE — 97167 OT EVAL HIGH COMPLEX 60 MIN: CPT

## 2020-12-21 RX ORDER — OXYCODONE HYDROCHLORIDE 5 MG/1
5 TABLET ORAL EVERY 6 HOURS PRN
Qty: 12 TABLET | Refills: 0 | Status: SHIPPED | OUTPATIENT
Start: 2020-12-21 | End: 2020-12-24

## 2020-12-21 RX ADMIN — AMLODIPINE BESYLATE 5 MG: 5 TABLET ORAL at 09:44

## 2020-12-21 RX ADMIN — ACETAMINOPHEN 650 MG: 325 TABLET, FILM COATED ORAL at 12:26

## 2020-12-21 RX ADMIN — HEPARIN SODIUM 5000 UNITS: 5000 INJECTION INTRAVENOUS; SUBCUTANEOUS at 05:28

## 2020-12-21 RX ADMIN — TIOTROPIUM BROMIDE 18 MCG: 18 CAPSULE ORAL; RESPIRATORY (INHALATION) at 09:42

## 2020-12-21 RX ADMIN — ASPIRIN 81 MG CHEWABLE TABLET 81 MG: 81 TABLET CHEWABLE at 09:44

## 2020-12-21 RX ADMIN — OXYCODONE HYDROCHLORIDE 5 MG: 5 TABLET ORAL at 05:27

## 2020-12-21 RX ADMIN — METHOCARBAMOL 500 MG: 500 TABLET ORAL at 05:27

## 2020-12-21 RX ADMIN — ACETAMINOPHEN 650 MG: 325 TABLET, FILM COATED ORAL at 05:28

## 2020-12-21 RX ADMIN — METOPROLOL TARTRATE 50 MG: 50 TABLET, FILM COATED ORAL at 09:44

## 2020-12-21 RX ADMIN — METHOCARBAMOL 500 MG: 500 TABLET ORAL at 12:27

## 2020-12-21 RX ADMIN — SALMETEROL XINAFOATE 1 PUFF: 50 POWDER, METERED ORAL; RESPIRATORY (INHALATION) at 09:43

## 2020-12-21 RX ADMIN — GABAPENTIN 100 MG: 100 CAPSULE ORAL at 09:44

## 2021-01-02 ENCOUNTER — HOSPITAL ENCOUNTER (EMERGENCY)
Facility: HOSPITAL | Age: 81
End: 2021-01-03
Attending: EMERGENCY MEDICINE | Admitting: EMERGENCY MEDICINE
Payer: MEDICARE

## 2021-01-02 ENCOUNTER — APPOINTMENT (EMERGENCY)
Dept: CT IMAGING | Facility: HOSPITAL | Age: 81
End: 2021-01-02
Payer: MEDICARE

## 2021-01-02 DIAGNOSIS — R18.8 INTRA-ABDOMINAL COLLECTION: Primary | ICD-10-CM

## 2021-01-02 DIAGNOSIS — N13.30 BILATERAL HYDRONEPHROSIS: ICD-10-CM

## 2021-01-02 LAB
ALBUMIN SERPL BCP-MCNC: 2.2 G/DL (ref 3.5–5)
ALP SERPL-CCNC: 87 U/L (ref 46–116)
ALT SERPL W P-5'-P-CCNC: 18 U/L (ref 12–78)
ANION GAP SERPL CALCULATED.3IONS-SCNC: 11 MMOL/L (ref 4–13)
AST SERPL W P-5'-P-CCNC: 29 U/L (ref 5–45)
BACTERIA UR QL AUTO: ABNORMAL /HPF
BASOPHILS # BLD AUTO: 0.03 THOUSANDS/ΜL (ref 0–0.1)
BASOPHILS NFR BLD AUTO: 0 % (ref 0–1)
BILIRUB SERPL-MCNC: 0.73 MG/DL (ref 0.2–1)
BILIRUB UR QL STRIP: NEGATIVE
BUN SERPL-MCNC: 15 MG/DL (ref 5–25)
CALCIUM ALBUM COR SERPL-MCNC: 10.2 MG/DL (ref 8.3–10.1)
CALCIUM SERPL-MCNC: 8.8 MG/DL (ref 8.3–10.1)
CHLORIDE SERPL-SCNC: 95 MMOL/L (ref 100–108)
CLARITY UR: ABNORMAL
CO2 SERPL-SCNC: 25 MMOL/L (ref 21–32)
COLOR UR: ABNORMAL
CREAT SERPL-MCNC: 1.03 MG/DL (ref 0.6–1.3)
CRP SERPL QL: 217.4 MG/L
EOSINOPHIL # BLD AUTO: 0.07 THOUSAND/ΜL (ref 0–0.61)
EOSINOPHIL NFR BLD AUTO: 1 % (ref 0–6)
ERYTHROCYTE [DISTWIDTH] IN BLOOD BY AUTOMATED COUNT: 14.4 % (ref 11.6–15.1)
FLUAV RNA RESP QL NAA+PROBE: NEGATIVE
FLUBV RNA RESP QL NAA+PROBE: NEGATIVE
GFR SERPL CREATININE-BSD FRML MDRD: 52 ML/MIN/1.73SQ M
GLUCOSE SERPL-MCNC: 156 MG/DL (ref 65–140)
GLUCOSE UR STRIP-MCNC: NEGATIVE MG/DL
HCT VFR BLD AUTO: 38.3 % (ref 34.8–46.1)
HGB BLD-MCNC: 12.1 G/DL (ref 11.5–15.4)
HGB UR QL STRIP.AUTO: NEGATIVE
IMM GRANULOCYTES # BLD AUTO: 0.06 THOUSAND/UL (ref 0–0.2)
IMM GRANULOCYTES NFR BLD AUTO: 1 % (ref 0–2)
KETONES UR STRIP-MCNC: NEGATIVE MG/DL
LACTATE SERPL-SCNC: 1.9 MMOL/L (ref 0.5–2)
LEUKOCYTE ESTERASE UR QL STRIP: NEGATIVE
LYMPHOCYTES # BLD AUTO: 2.03 THOUSANDS/ΜL (ref 0.6–4.47)
LYMPHOCYTES NFR BLD AUTO: 15 % (ref 14–44)
MCH RBC QN AUTO: 26.4 PG (ref 26.8–34.3)
MCHC RBC AUTO-ENTMCNC: 31.6 G/DL (ref 31.4–37.4)
MCV RBC AUTO: 84 FL (ref 82–98)
MONOCYTES # BLD AUTO: 1.29 THOUSAND/ΜL (ref 0.17–1.22)
MONOCYTES NFR BLD AUTO: 10 % (ref 4–12)
MUCOUS THREADS UR QL AUTO: ABNORMAL
NEUTROPHILS # BLD AUTO: 9.81 THOUSANDS/ΜL (ref 1.85–7.62)
NEUTS SEG NFR BLD AUTO: 73 % (ref 43–75)
NITRITE UR QL STRIP: NEGATIVE
NON-SQ EPI CELLS URNS QL MICRO: ABNORMAL /HPF
NRBC BLD AUTO-RTO: 0 /100 WBCS
NT-PROBNP SERPL-MCNC: 1857 PG/ML
OTHER STN SPEC: ABNORMAL
PH UR STRIP.AUTO: 8.5 [PH]
PLATELET # BLD AUTO: 473 THOUSANDS/UL (ref 149–390)
PMV BLD AUTO: 9.6 FL (ref 8.9–12.7)
POTASSIUM SERPL-SCNC: 3.8 MMOL/L (ref 3.5–5.3)
PROT SERPL-MCNC: 7.4 G/DL (ref 6.4–8.2)
PROT UR STRIP-MCNC: ABNORMAL MG/DL
RBC # BLD AUTO: 4.58 MILLION/UL (ref 3.81–5.12)
RBC #/AREA URNS AUTO: ABNORMAL /HPF
RSV RNA RESP QL NAA+PROBE: NEGATIVE
SARS-COV-2 RNA RESP QL NAA+PROBE: NEGATIVE
SODIUM SERPL-SCNC: 131 MMOL/L (ref 136–145)
SP GR UR STRIP.AUTO: 1.01 (ref 1–1.03)
TROPONIN I SERPL-MCNC: <0.02 NG/ML
UROBILINOGEN UR QL STRIP.AUTO: 0.2 E.U./DL
WBC # BLD AUTO: 13.29 THOUSAND/UL (ref 4.31–10.16)
WBC #/AREA URNS AUTO: ABNORMAL /HPF

## 2021-01-02 PROCEDURE — 83605 ASSAY OF LACTIC ACID: CPT | Performed by: EMERGENCY MEDICINE

## 2021-01-02 PROCEDURE — 85025 COMPLETE CBC W/AUTO DIFF WBC: CPT | Performed by: EMERGENCY MEDICINE

## 2021-01-02 PROCEDURE — 36415 COLL VENOUS BLD VENIPUNCTURE: CPT | Performed by: EMERGENCY MEDICINE

## 2021-01-02 PROCEDURE — 86140 C-REACTIVE PROTEIN: CPT | Performed by: EMERGENCY MEDICINE

## 2021-01-02 PROCEDURE — 80053 COMPREHEN METABOLIC PANEL: CPT | Performed by: EMERGENCY MEDICINE

## 2021-01-02 PROCEDURE — 96361 HYDRATE IV INFUSION ADD-ON: CPT

## 2021-01-02 PROCEDURE — 71275 CT ANGIOGRAPHY CHEST: CPT

## 2021-01-02 PROCEDURE — 96367 TX/PROPH/DG ADDL SEQ IV INF: CPT

## 2021-01-02 PROCEDURE — G1004 CDSM NDSC: HCPCS

## 2021-01-02 PROCEDURE — 84484 ASSAY OF TROPONIN QUANT: CPT | Performed by: EMERGENCY MEDICINE

## 2021-01-02 PROCEDURE — 81001 URINALYSIS AUTO W/SCOPE: CPT | Performed by: EMERGENCY MEDICINE

## 2021-01-02 PROCEDURE — 99285 EMERGENCY DEPT VISIT HI MDM: CPT

## 2021-01-02 PROCEDURE — 83880 ASSAY OF NATRIURETIC PEPTIDE: CPT | Performed by: EMERGENCY MEDICINE

## 2021-01-02 PROCEDURE — 74177 CT ABD & PELVIS W/CONTRAST: CPT

## 2021-01-02 PROCEDURE — 96365 THER/PROPH/DIAG IV INF INIT: CPT

## 2021-01-02 PROCEDURE — 99285 EMERGENCY DEPT VISIT HI MDM: CPT | Performed by: EMERGENCY MEDICINE

## 2021-01-02 PROCEDURE — 1123F ACP DISCUSS/DSCN MKR DOCD: CPT | Performed by: PHYSICIAN ASSISTANT

## 2021-01-02 PROCEDURE — 93005 ELECTROCARDIOGRAM TRACING: CPT

## 2021-01-02 PROCEDURE — 0241U HB NFCT DS VIR RESP RNA 4 TRGT: CPT | Performed by: EMERGENCY MEDICINE

## 2021-01-02 RX ORDER — SODIUM CHLORIDE 9 MG/ML
75 INJECTION, SOLUTION INTRAVENOUS CONTINUOUS
Status: DISCONTINUED | OUTPATIENT
Start: 2021-01-02 | End: 2021-01-03 | Stop reason: HOSPADM

## 2021-01-02 RX ORDER — MORPHINE SULFATE 4 MG/ML
4 INJECTION, SOLUTION INTRAMUSCULAR; INTRAVENOUS ONCE
Status: COMPLETED | OUTPATIENT
Start: 2021-01-03 | End: 2021-01-03

## 2021-01-02 RX ORDER — VANCOMYCIN HYDROCHLORIDE 1 G/200ML
15 INJECTION, SOLUTION INTRAVENOUS ONCE
Status: COMPLETED | OUTPATIENT
Start: 2021-01-02 | End: 2021-01-02

## 2021-01-02 RX ADMIN — SODIUM CHLORIDE 250 ML/HR: 0.9 INJECTION, SOLUTION INTRAVENOUS at 19:40

## 2021-01-02 RX ADMIN — VANCOMYCIN HYDROCHLORIDE 1000 MG: 1 INJECTION, SOLUTION INTRAVENOUS at 22:53

## 2021-01-02 RX ADMIN — SODIUM CHLORIDE 3.38 G: 9 INJECTION, SOLUTION INTRAVENOUS at 23:55

## 2021-01-02 RX ADMIN — IOHEXOL 100 ML: 350 INJECTION, SOLUTION INTRAVENOUS at 20:56

## 2021-01-02 RX ADMIN — SODIUM CHLORIDE 250 ML/HR: 0.9 INJECTION, SOLUTION INTRAVENOUS at 23:55

## 2021-01-03 ENCOUNTER — HOSPITAL ENCOUNTER (INPATIENT)
Facility: HOSPITAL | Age: 81
LOS: 4 days | Discharge: HOME | DRG: 862 | End: 2021-01-07
Attending: SURGERY | Admitting: SURGERY
Payer: MEDICARE

## 2021-01-03 VITALS
RESPIRATION RATE: 20 BRPM | HEART RATE: 91 BPM | BODY MASS INDEX: 28 KG/M2 | DIASTOLIC BLOOD PRESSURE: 68 MMHG | WEIGHT: 142.64 LBS | HEIGHT: 60 IN | TEMPERATURE: 97.6 F | SYSTOLIC BLOOD PRESSURE: 142 MMHG | OXYGEN SATURATION: 96 %

## 2021-01-03 DIAGNOSIS — T81.43XA ABSCESS, INTRA-ABDOMINAL, POSTOPERATIVE: Primary | ICD-10-CM

## 2021-01-03 DIAGNOSIS — R55 SYNCOPE, UNSPECIFIED SYNCOPE TYPE: ICD-10-CM

## 2021-01-03 LAB
GLUCOSE SERPL-MCNC: 118 MG/DL (ref 65–140)
GLUCOSE SERPL-MCNC: 156 MG/DL (ref 65–140)
GLUCOSE SERPL-MCNC: 76 MG/DL (ref 65–140)
GLUCOSE SERPL-MCNC: 94 MG/DL (ref 65–140)

## 2021-01-03 PROCEDURE — 96375 TX/PRO/DX INJ NEW DRUG ADDON: CPT

## 2021-01-03 PROCEDURE — 96361 HYDRATE IV INFUSION ADD-ON: CPT

## 2021-01-03 PROCEDURE — 82948 REAGENT STRIP/BLOOD GLUCOSE: CPT

## 2021-01-03 PROCEDURE — NC001 PR NO CHARGE: Performed by: STUDENT IN AN ORGANIZED HEALTH CARE EDUCATION/TRAINING PROGRAM

## 2021-01-03 RX ORDER — LEVOTHYROXINE SODIUM 125 UG/1
125 CAPSULE ORAL DAILY
COMMUNITY

## 2021-01-03 RX ORDER — GLIMEPIRIDE 1 MG/1
1 TABLET ORAL
COMMUNITY
End: 2021-10-21 | Stop reason: CLARIF

## 2021-01-03 RX ORDER — OXYCODONE HYDROCHLORIDE 5 MG/1
5 TABLET ORAL EVERY 4 HOURS PRN
Status: DISCONTINUED | OUTPATIENT
Start: 2021-01-03 | End: 2021-01-05

## 2021-01-03 RX ORDER — ALBUTEROL SULFATE 90 UG/1
2 AEROSOL, METERED RESPIRATORY (INHALATION) EVERY 4 HOURS PRN
Status: DISCONTINUED | OUTPATIENT
Start: 2021-01-03 | End: 2021-01-07 | Stop reason: HOSPADM

## 2021-01-03 RX ORDER — GLIMEPIRIDE 1 MG/1
1 TABLET ORAL
COMMUNITY

## 2021-01-03 RX ORDER — OXYCODONE HYDROCHLORIDE 5 MG/1
2.5 TABLET ORAL EVERY 4 HOURS PRN
Status: DISCONTINUED | OUTPATIENT
Start: 2021-01-03 | End: 2021-01-05

## 2021-01-03 RX ORDER — METOPROLOL TARTRATE 50 MG/1
50 TABLET, FILM COATED ORAL EVERY 12 HOURS SCHEDULED
Status: DISCONTINUED | OUTPATIENT
Start: 2021-01-03 | End: 2021-01-07 | Stop reason: HOSPADM

## 2021-01-03 RX ORDER — SODIUM CHLORIDE, SODIUM LACTATE, POTASSIUM CHLORIDE, CALCIUM CHLORIDE 600; 310; 30; 20 MG/100ML; MG/100ML; MG/100ML; MG/100ML
100 INJECTION, SOLUTION INTRAVENOUS CONTINUOUS
Status: DISCONTINUED | OUTPATIENT
Start: 2021-01-03 | End: 2021-01-03

## 2021-01-03 RX ORDER — PRAVASTATIN SODIUM 20 MG
20 TABLET ORAL
Status: DISCONTINUED | OUTPATIENT
Start: 2021-01-03 | End: 2021-01-07 | Stop reason: HOSPADM

## 2021-01-03 RX ORDER — AMLODIPINE BESYLATE 5 MG/1
5 TABLET ORAL DAILY
Status: DISCONTINUED | OUTPATIENT
Start: 2021-01-03 | End: 2021-01-07 | Stop reason: HOSPADM

## 2021-01-03 RX ORDER — ASPIRIN 81 MG/1
81 TABLET ORAL DAILY
Status: DISCONTINUED | OUTPATIENT
Start: 2021-01-03 | End: 2021-01-07 | Stop reason: HOSPADM

## 2021-01-03 RX ORDER — IPRATROPIUM BROMIDE AND ALBUTEROL SULFATE 2.5; .5 MG/3ML; MG/3ML
3 SOLUTION RESPIRATORY (INHALATION)
Status: DISCONTINUED | OUTPATIENT
Start: 2021-01-03 | End: 2021-01-03

## 2021-01-03 RX ORDER — HYDROMORPHONE HCL/PF 1 MG/ML
0.2 SYRINGE (ML) INJECTION
Status: DISCONTINUED | OUTPATIENT
Start: 2021-01-03 | End: 2021-01-05

## 2021-01-03 RX ORDER — PRAVASTATIN SODIUM 10 MG
10 TABLET ORAL
Status: DISCONTINUED | OUTPATIENT
Start: 2021-01-03 | End: 2021-01-03

## 2021-01-03 RX ORDER — ONDANSETRON 2 MG/ML
4 INJECTION INTRAMUSCULAR; INTRAVENOUS EVERY 4 HOURS PRN
Status: DISCONTINUED | OUTPATIENT
Start: 2021-01-03 | End: 2021-01-03

## 2021-01-03 RX ORDER — ONDANSETRON 2 MG/ML
4 INJECTION INTRAMUSCULAR; INTRAVENOUS EVERY 4 HOURS PRN
Status: DISCONTINUED | OUTPATIENT
Start: 2021-01-03 | End: 2021-01-07 | Stop reason: HOSPADM

## 2021-01-03 RX ORDER — SODIUM CHLORIDE, SODIUM LACTATE, POTASSIUM CHLORIDE, CALCIUM CHLORIDE 600; 310; 30; 20 MG/100ML; MG/100ML; MG/100ML; MG/100ML
75 INJECTION, SOLUTION INTRAVENOUS CONTINUOUS
Status: DISCONTINUED | OUTPATIENT
Start: 2021-01-04 | End: 2021-01-05

## 2021-01-03 RX ADMIN — MORPHINE SULFATE 4 MG: 4 INJECTION INTRAVENOUS at 07:45

## 2021-01-03 RX ADMIN — ASPIRIN 81 MG: 81 TABLET ORAL at 12:32

## 2021-01-03 RX ADMIN — SODIUM CHLORIDE, SODIUM LACTATE, POTASSIUM CHLORIDE, AND CALCIUM CHLORIDE 75 ML/HR: .6; .31; .03; .02 INJECTION, SOLUTION INTRAVENOUS at 22:37

## 2021-01-03 RX ADMIN — INSULIN LISPRO 1 UNITS: 100 INJECTION, SOLUTION INTRAVENOUS; SUBCUTANEOUS at 18:07

## 2021-01-03 RX ADMIN — OXYCODONE HYDROCHLORIDE 2.5 MG: 5 TABLET ORAL at 22:38

## 2021-01-03 RX ADMIN — METOPROLOL TARTRATE 50 MG: 50 TABLET, FILM COATED ORAL at 22:37

## 2021-01-03 RX ADMIN — ENOXAPARIN SODIUM 40 MG: 40 INJECTION SUBCUTANEOUS at 12:32

## 2021-01-03 RX ADMIN — PRAVASTATIN SODIUM 20 MG: 20 TABLET ORAL at 18:04

## 2021-01-03 RX ADMIN — AMLODIPINE BESYLATE 5 MG: 5 TABLET ORAL at 12:32

## 2021-01-03 RX ADMIN — METOPROLOL TARTRATE 50 MG: 50 TABLET, FILM COATED ORAL at 12:32

## 2021-01-03 NOTE — ED PROVIDER NOTES
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 Taking?    Calcium Carb-Cholecalciferol (CALCIUM 600/VITAMIN D3 PO)   Yes No   Sig: Take by mouth   amLODIPine (NORVASC) 5 mg tablet   Yes No   Sig: Take 5 mg by mouth daily   aspirin 81 mg chewable tablet   Yes No   Sig: Chew 81 mg daily   co-enzyme Q-10 30 MG capsule   Yes No   Sig: Take 30 mg by mouth 3 (three) times a day   glyBURIDE (DIABETA) 1 25 mg tablet   Yes No   Sig: Take 1 25 mg by mouth 2 (two) times a day with meals   lisinopril (ZESTRIL) 40 mg tablet   Yes No   Sig: Take 40 mg by mouth every evening   metFORMIN (GLUCOPHAGE) 500 mg tablet   Yes No   Sig: Take 500 mg by mouth 2 (two) times a day with meals   metoprolol tartrate (LOPRESSOR) 50 mg tablet   Yes No   Sig: Take 50 mg by mouth every 12 (twelve) hours Takes 1 tab in am, 1/2 tab in pm   simvastatin (ZOCOR) 10 mg tablet   Yes No   Sig: Take 10 mg by mouth daily at bedtime   tiotropium-olodaterol (Stiolto Respimat) 2 5-2 5 MCG/ACT inhaler   Yes No   Sig: Inhale 2 puffs daily      Facility-Administered Medications: None       Past Medical History:   Diagnosis Date    Cancer ([facility] )     COPD (chronic obstructive pulmonary disease) ([facility] )     Diabetes mellitus ([facility] )     Hypertension     On home oxygen therapy     2L @ hs & with exertional activity        Past Surgical History:   Procedure Laterality Date    CATARACT EXTRACTION      CORONARY ANGIOPLASTY WITH STENT PLACEMENT      placed [id number]    EXTRACORPOREAL SHOCK WAVE LITHOTRIPSY      Jakobi 70 W/ ANASTOMOSIS Left 12/18/2020    Procedure: LAPAROSCOPIC HEMICOLECTOMY, TAKE DOWN OF SPLENIC FLEXURE, COLORECTAL ANASTAMOSIS;  Surgeon: Mikayla Morgan MD;  [facility]: [facility];  Service: Colorectal    HYSTERECTOMY      KIDNEY SURGERY      'growth removed'     NERVE SURGERY      R arm     TONSILLECTOMY      TUBAL LIGATION         Family History   Problem Relation Age of Onset    Cancer Mother     Diabetes Father     Hypertension Father     Heart attack Father     Diabetes Sister     Cancer Sister     Heart disease Brother     Stroke Maternal Grandmother      I have reviewed and agree with the history as documented  E-Cigarette/Vaping    E-Cigarette Use Never User      E-Cigarette/Vaping Substances    Nicotine No     THC No     CBD No     Flavoring No     Other No     Unknown No      Social History     Tobacco Use    Smoking status: Former Smoker    Smokeless tobacco: Never Used    Tobacco comment: quit 2000    Substance Use Topics    Alcohol use: Not Currently    Drug use: Never       Review of Systems   Constitutional: Negative for fever  Respiratory: Positive for shortness of breath  Cardiovascular: Negative for chest pain  Gastrointestinal: Positive for abdominal pain  All other systems reviewed and are negative  Physical Exam  Physical Exam  Vitals signs and nursing note reviewed  Constitutional:       Comments: Pleasant, comfortable-appearing, conversational   HENT:      Head: Normocephalic and atraumatic  Eyes:      Conjunctiva/sclera: Conjunctivae normal       Pupils: Pupils are equal, round, and reactive to light     Neck: Musculoskeletal: Neck supple  Cardiovascular:      Rate and Rhythm: Regular rhythm  Tachycardia present  Heart sounds: Normal heart sounds  Pulmonary:      Effort: Pulmonary effort is normal       Breath sounds: Decreased breath sounds present  Abdominal:      General: Bowel sounds are normal  There is no distension  Palpations: Abdomen is soft  Tenderness: There is abdominal tenderness  There is guarding  Comments: Generally tender, firmer, laparoscopic surgical wounds appropriately bruised, healing   Musculoskeletal: Normal range of motion  Skin:     General: Skin is warm and dry  Neurological:      General: No focal deficit present  Mental Status: She is alert and oriented to person, place, and time  Cranial Nerves: No cranial nerve deficit  Coordination: Coordination normal    Psychiatric:         Behavior: Behavior normal          Thought Content:  Thought content normal          Judgment: Judgment normal          Vital Signs  ED Triage Vitals   Temperature Pulse Respirations Blood Pressure SpO2   01/02/21 1931 01/02/21 1932 01/02/21 1932 01/02/21 1932 01/02/21 1932   97 6 °F (36 4 °C) (!) 116 (!) 25 (!) 181/74 99 %      Temp Source Heart Rate Source Patient Position - Orthostatic VS BP Location FiO2 (%)   01/02/21 1931 01/02/21 2000 01/02/21 1932 01/02/21 1932 --   Temporal Monitor Lying Right arm       Pain Score       01/02/21 1932       No Pain           Vitals:    01/02/21 2030 01/02/21 2140 01/02/21 2245 01/02/21 2300   BP: 130/64 141/63 144/67 141/66   Pulse: 90 101 95 94   Patient Position - Orthostatic VS:  Lying Lying Lying         Visual Acuity      ED Medications  Medications   sodium chloride 0 9 % infusion (250 mL/hr Intravenous New Bag 1/2/21 1940)   piperacillin-tazobactam (ZOSYN) 3 375 g in sodium chloride 0 9 % 100 mL IVPB (has no administration in time range)   vancomycin (VANCOCIN) IVPB (premix in dextrose) 1,000 mg 200 mL (1,000 mg Intravenous New Bag 1/2/21 2253)   iohexol (OMNIPAQUE) 350 MG/ML injection (SINGLE-DOSE) 100 mL (100 mL Intravenous Given 1/2/21 2056)       Diagnostic Studies  Results Reviewed     Procedure Component Value Units Date/Time    Urine Microscopic [[id number]]  (Abnormal) Collected: 01/02/21 2210    Lab Status: Final result Specimen: Urine, Clean Catch Updated: 01/02/21 2255     RBC, UA None Seen /hpf      WBC, UA Innumerable /hpf      Epithelial Cells Occasional /hpf      Bacteria, UA Moderate /hpf      OTHER OBSERVATIONS Glitter Cells     MUCUS THREADS Innumerable    Narrative:      Microscopic not performed on concentrated sample due to low volume urine  UA (URINE) with reflex to Scope [[id number]]  (Abnormal) Collected: 01/02/21 2210    Lab Status: Final result Specimen: Urine, Clean Catch Updated: 01/02/21 2221     Color, UA Straw     Clarity, UA Turbid     Specific Lily, UA 1 015     pH, UA 8 5     Leukocytes, UA Negative     Nitrite, UA Negative     Protein, UA Trace mg/dl      Glucose, UA Negative mg/dl      Ketones, UA Negative mg/dl      Urobilinogen, UA 0 2 E U /dl      Bilirubin, UA Negative     Blood, UA Negative    COVID19, Influenza A/B, RSV PCR, [facility] [[id number]]  (Normal) Collected: 01/02/21 1938    Lab Status: Final result Specimen: Nares from Nasopharyngeal Swab Updated: 01/02/21 2029     SARS-CoV-2 Negative     INFLUENZA A PCR Negative     INFLUENZA B PCR Negative     RSV PCR Negative    Narrative: This test has been authorized by FDA under an EUA (Emergency Use Assay) for use by authorized laboratories  Clinical caution and judgement should be used with the interpretation of these results with consideration of the clinical impression and other laboratory testing  Testing reported as "Positive" or "Negative" has been proven to be accurate according to standard laboratory validation requirements  All testing is performed with control materials showing appropriate reactivity at standard intervals  NT-BNP PRO [[id number]]  (Abnormal) Collected: 01/02/21 1936    Lab Status: Final result Specimen: Blood from Arm, Left Updated: 01/02/21 2010     NT-proBNP 1,857 pg/mL     C-reactive protein [[id number]]  (Abnormal) Collected: 01/02/21 1936    Lab Status: Final result Specimen: Blood from Arm, Left Updated: 01/02/21 2010      4 mg/L     Troponin I [[id number]]  (Normal) Collected: 01/02/21 1936    Lab Status: Final result Specimen: Blood from Arm, Left Updated: 01/02/21 2007     Troponin I <0 02 ng/mL     Lactic acid, plasma [[id number]]  (Normal) Collected: 01/02/21 1936    Lab Status: Final result Specimen: Blood from Arm, Left Updated: 01/02/21 2006     LACTIC ACID 1 9 mmol/L     Narrative:      Result may be elevated if tourniquet was used during collection      Comprehensive metabolic panel [[id number]]  (Abnormal) Collected: 01/02/21 1936    Lab Status: Final result Specimen: Blood from Arm, Left Updated: 01/02/21 2003     Sodium 131 mmol/L      Potassium 3 8 mmol/L      Chloride 95 mmol/L      CO2 25 mmol/L      ANION GAP 11 mmol/L      BUN 15 mg/dL      Creatinine 1 03 mg/dL      Glucose 156 mg/dL      Calcium 8 8 mg/dL      Corrected Calcium 10 2 mg/dL      AST 29 U/L      ALT 18 U/L      Alkaline Phosphatase 87 U/L      Total Protein 7 4 g/dL      Albumin 2 2 g/dL      Total Bilirubin 0 73 mg/dL      eGFR 52 ml/min/1 73sq m     Narrative:      [place] guidelines for Chronic Kidney Disease (CKD):     Stage 1 with normal or high GFR (GFR > 90 mL/min/1 73 square meters)    Stage 2 Mild CKD (GFR = 60-89 mL/min/1 73 square meters)    Stage 3A Moderate CKD (GFR = 45-59 mL/min/1 73 square meters)    Stage 3B Moderate CKD (GFR = 30-44 mL/min/1 73 square meters)    Stage 4 Severe CKD (GFR = 15-29 mL/min/1 73 square meters)    Stage 5 End Stage CKD (GFR <15 mL/min/1 73 square meters)  Note: GFR calculation is accurate only with a steady state creatinine    CBC and differential [[id number]]  (Abnormal) Collected: 01/02/21 1936    Lab Status: Final result Specimen: Blood from Arm, Left Updated: 01/02/21 1948     WBC 13 29 Thousand/uL      RBC 4 58 Million/uL      Hemoglobin 12 1 g/dL      Hematocrit 38 3 %      MCV 84 fL      MCH 26 4 pg      MCHC 31 6 g/dL      RDW 14 4 %      MPV 9 6 fL      Platelets 788 Thousands/uL      nRBC 0 /100 WBCs      Neutrophils Relative 73 %      Immat GRANS % 1 %      Lymphocytes Relative 15 %      Monocytes Relative 10 %      Eosinophils Relative 1 %      Basophils Relative 0 %      Neutrophils Absolute 9 81 Thousands/µL      Immature Grans Absolute 0 06 Thousand/uL      Lymphocytes Absolute 2 03 Thousands/µL      Monocytes Absolute 1 29 Thousand/µL      Eosinophils Absolute 0 07 Thousand/µL      Basophils Absolute 0 03 Thousands/µL                  PE Study with CT Abdomen and Pelvis with contrast   Final Result by Westley Perez MD (01/02 2244)      1   9 5 x 6 2 x 9 2 cm lobulated collection in the pelvis anterior to the rectum demonstrating homogenous internal intermediate density which may represent a hematoma, superimposed infection is suspected given rim enhancement and surrounding inflammatory    change  This collection is remote from the sigmoid anastomosis and there is no free air to suggest perforation  If there is clinical concern for fistula to the bowel CT examination with enteric and rectal contrast may be obtained for further    evaluation  2   There is a 6 4 x 1 3 x 2 2 cm left intra-abdominal wall collection containing a fat fluid level likely due to seroma versus hematoma  3   Mild thickening of the rectosigmoid colon which may be reactive  4   Thickening of the urinary bladder wall and trace perivesicular stranding may be reactive however with urinalysis for cystitis  5   Incidental 3 cm right adnexal cyst and prominent size of left ovary   In this postmenopausal woman, this should be followed up in 6 to 12 months by pelvic ultrasound  6   Bilateral nonobstructive nephrolithiasis  There is mild bilateral hydroureteronephrosis likely due to ureteral involvement from pelvic collection/inflammation  Metastatic deposits involving the distal ureters is not excluded however less likely  7   Other findings as above  I personally discussed this study with Jeovanny Tang on 1/2/2021 at 10:42 PM       Workstation performed: [id number]                    Procedures  Procedures         ED Course  ED Course as of Jan 02 2341   Sat Jan 02, 2021 1956 EKG 7:40 p m   Sinus tachycardia rate axis low voltage no ST elevation or depression compared to 12/18/2020 with anterior T-wave inversions interpreted by me      2242 Radiology [facility] call with multiple findings including pelvic collection 9 cm with inflammatory changes, abd wall seroma, ovarian cysts, jemma hydro      2338 Colorectal [place] accepts in transfer to [facility]                                              MDM    Disposition  Final diagnoses:   Intra-abdominal collection   Bilateral hydronephrosis     Time reflects when diagnosis was documented in both MDM as applicable and the Disposition within this note     Time User Action Codes Description Comment    1/2/2021 10:52 PM Nitesh Bolton Add [R18 8] Intra-abdominal collection     1/2/2021 10:52 PM Nitesh Bolton Add [N13 30] Bilateral hydronephrosis       ED Disposition     ED Disposition Condition Date/Time Comment    Transfer to Another Facility-In Network  EA Jan 2, 2021 10:52 PM Jenna Rodrigues should be transferred out to [facility]        MD Documentation      Most Recent Value   Patient Condition  The patient has been stabilized such that within reasonable medical probability, no material deterioration of the patient condition or the condition of the unborn child(katelynn) is likely to result from the transfer   Reason for Transfer  Level of Care needed not available at this facility   Benefits of Transfer  Specialized equipment and/or services available at the receiving facility (Include comment)________________________   Risks of Transfer  Potential for delay in receiving treatment   Accepting Physician  [address] [facility] [address] Name, Amira Rankin MD  [facility]   Provider Certification  General risk, such as traffic hazards, adverse weather conditions, rough terrain or turbulence, possible failure of equipment (including vehicle or aircraft), or consequences of actions of persons outside the control of the transport personnel, Unanticipated needs of medical equipment and personnel during transport      RN Documentation      [address] Name, [address]   SLB      Follow-up Information    None         Patient's Medications   Discharge Prescriptions    No medications on file     No discharge procedures on file      PDMP Review       Value Time User    PDMP Reviewed  Yes 12/21/2020  2:40 PM Alberto NINA&#39;RALEIGH Brewster          ED Provider  Electronically Signed by           Zain Pérez DO  01/02/21 3810

## 2021-01-03 NOTE — CONSULTS
INTERPROFESSIONAL (PHONE) CONSULTATION - Interventional Radiology  Ivet Saldaña 79 y o  female MRN: [id number]  Unit/Bed#: [facility] 913-01 Encounter: [id number]    IR has been consulted to evaluate the patient, determine the appropriate procedure, and whether or not a procedure can and should be performed regarding the care of Ivet Saldaña  We were consulted by surgery concerning this patient, and to possibly perform a drainage catheter if medically appropriate for the patient  Consults  01/03/21      Assessment/Recommendation:   79 yoF s/p hemicolectomy c/b pelvic fluid collection with concern for abscess  IR consulted for possible drainage  Will plan for CT guided drainage catheter placement tomorrow pending schedule availability  Please hold tomorrow's lovenox  Make NPO at [place]  Total time spent in review of data, discussion with requesting provider and rendering advice was 15 minutes  Patient or appropriate family member was verbally informed by 15 minutes of this consultative service on their behalf to provide more timely access to specialty care in lieu of an in person consultation  They were informed it is a billable service unless the it was determined an in person follow up was medically necessary by me within the next 14 days at which time only the in person consult would be billed  Verbal consent was obtained  Thank you for allowing Interventional Radiology to participate in the care of Ivet Saldaña  Please don't hesitate to call or TigerText us with any questions       Lane Barcenas MD

## 2021-01-03 NOTE — RESPIRATORY THERAPY NOTE
RT Protocol Note  Leanna Horn 79 y o  female MRN: [id number]  Unit/Bed#: [facility] 913-01 Encounter: [id number]    Assessment    Active Problems:    * No active hospital problems  *      Home Pulmonary Medications:  Respimat, combivent mdi prn       Past Medical History:   Diagnosis Date    Cancer ([facility] )     COPD (chronic obstructive pulmonary disease) ([facility] )     Diabetes mellitus ([facility] )     Hypertension     On home oxygen therapy     2L @ hs & with exertional activity      Social History     Socioeconomic History    Marital status:       Spouse name: Not on file    Number of children: Not on file    Years of education: Not on file    Highest education level: Not on file   Occupational History    Not on file   Social Needs    Financial resource strain: Not on file    Food insecurity     Worry: Not on file     Inability: Not on file    Transportation needs     Medical: Not on file     Non-medical: Not on file   Tobacco Use    Smoking status: Former Smoker    Smokeless tobacco: Never Used    Tobacco comment: quit 2000    Substance and Sexual Activity    Alcohol use: Not Currently    Drug use: Never    Sexual activity: Not on file   Lifestyle    Physical activity     Days per week: Not on file     Minutes per session: Not on file    Stress: Not on file   Relationships    Social connections     Talks on phone: Not on file     Gets together: Not on file     Attends [employer] witness service: Not on file     Active member of club or organization: Not on file     Attends meetings of clubs or organizations: Not on file     Relationship status: Not on file    Intimate partner violence     Fear of current or ex partner: Not on file     Emotionally abused: Not on file     Physically abused: Not on file     Forced sexual activity: Not on file   Other Topics Concern    Not on file   Social History Narrative    Not on file       Subjective         Objective    Physical Exam:   Assessment Type: Assess only  General Appearance: Awake, Alert  Respiratory Pattern: Dyspnea with exertion  Chest Assessment: Chest expansion symmetrical, Trachea midline  Bilateral Breath Sounds: Clear, Diminished  Cough: None    Vitals:  Blood pressure 120/64, pulse 91, temperature 98 2 °F (36 8 °C), resp  rate 18, SpO2 93 %  Imaging and other studies: I have personally reviewed pertinent reports  Plan    Respiratory Plan: Home Bronchodilator Patient pathway        Resp Comments: (P) Pt evaluated per resp protocol s/p admittance for an abdominal wall abscess  She does have a COPD hx  Pt is resting on rma and denies any distress/sob as long as she remains in bed or seated  She does take respimat daily and uses combivent mdi on a prn basis  Scheduled bronchodilators are not currently indicated @ this time  Will order pt on albuterol mdi q4prn while she remains inpatient

## 2021-01-03 NOTE — ED NOTES
Patient rang call bell and stated she urinated  Patients briefs changed at this time        Roderick Treviño RN  01/03/21 1316

## 2021-01-03 NOTE — H&P
H&P- Colorectal Surgery   Santos Cockayne 79 y o  female MRN: [id number]  Unit/Bed#: [facility] 913-01 Encounter: [id number]      Assessment/Plan      Assessment:  Pt is a 80 y/o F w h/o COPD, DM, sigmoid colon ca s/p laparoscopic L ehemicolectomy on 12/18 who presents with abdominal pain and CT imaging demonstrating 9x6x9 cm pelvic fluid collection concerning for abscess  Vss  Afebrile  abd incision sites c/d/i  abd is soft, severe tenderness throughout lower abdomen, non distended  Plan:  Keep NPO  IVF, LR @ 100cc/h  Consult IR for drainage   dvt ppx lovenox  Pain control oxy  Nausea control zofran    History of Present Illness   Physician Requesting Consult: Benji Carter MD  Reason for Consult / Principal Problem: abdominal pain, post op abscess  History, ROS and PFSH unobtainable from any source due to none  HPI: Santos Cockayne is a 79y o  year old female past medical history hypertension, diabetes, COPD, colorectal cancer status post left laparoscopic hemicolectomy on 12/18/2020 who presents with abdominal pain, lethargy  CT imaging demonstrating 9 x 6 x 9 cm pelvic fluid collection anterior to the rectum  Denies any history of heart attack or stroke  Takes aspirin 81 mg daily  Denied any fevers, chills, night sweats, chest pain or shortness of breath today  Of note patient is on home oxygen 2 L  Patient's last bowel movement was 2 days ago which is normal for her  Noted passing flatus today  Review of Systems   Constitutional: Negative  Negative for chills and fever  HENT: Negative  Eyes: Negative  Respiratory: Negative for chest tightness and shortness of breath  Cardiovascular: Negative  Gastrointestinal: Positive for abdominal pain  Negative for constipation, diarrhea, nausea and vomiting  Endocrine: Negative  Genitourinary: Negative  Musculoskeletal: Negative  Skin: Negative  Allergic/Immunologic: Negative  Neurological: Negative  Hematological: Negative  Psychiatric/Behavioral: Negative  Historical Information   Past Medical History:   Diagnosis Date    [facility]     COPD [facility]ronic obstructive [facility] disease) [facility]     Diabetes mellitus [facility]     Hypertension     On home oxygen therapy     2L @ hs & with exertional activity      Past Surgical History:   Procedure Laterality Date    CATARACT EXTRACTION      CORONARY ANGIOPLASTY WITH STENT PLACEMENT      placed [id number]    EXTRACORPOREAL SHOCK WAVE LITHOTRIPSY      Jakobi 70 W/ ANASTOMOSIS Left 12/18/2020    Procedure: LAPAROSCOPIC HEMICOLECTOMY, TAKE DOWN OF SPLENIC FLEXURE, COLORECTAL ANASTAMOSIS;  Surgeon: Josette Gilman MD;  [facility];  Service: Colorectal    HYSTERECTOMY      KIDNEY SURGERY      'growth removed'     NERVE SURGERY      R arm     TONSILLECTOMY      TUBAL LIGATION       Social History   Social History     Substance and Sexual Activity   Alcohol Use Not Currently     Social History     Substance and Sexual Activity   Drug Use Never     E-Cigarette/Vaping    E-Cigarette Use Never User      E-Cigarette/Vaping Substances    Nicotine No     THC No     CBD No     Flavoring No     Other No     Unknown No      Social History     Tobacco Use   Smoking Status Former Smoker   Smokeless Tobacco Never Used   Tobacco Comment    quit 2000      Family History: non-contributory}    Meds/Allergies   all current active meds have been reviewed  Allergies   Allergen Reactions    Demerol [Meperidine] Other (See Comments)     'eyes enlarged'        Objective   Vitals: Blood pressure 120/64, pulse 91, temperature 98 2 °F (36 8 °C), resp  rate 18, SpO2 93 %  ,There is no height or weight on file to calculate BMI    No intake or output data in the 24 hours ending 01/03/21 1108  Invasive Devices     Peripheral Intravenous Line            Peripheral IV 01/02/21 Left;Ventral (anterior) Forearm less than 1 day          Drain            External Urinary Catheter 13 days Physical Exam  Vitals signs reviewed  Constitutional:       Appearance: Normal appearance  HENT:      Head: Normocephalic and atraumatic  Nose: Nose normal       Mouth/Throat:      Mouth: Mucous membranes are moist    Eyes:      General: No scleral icterus  Pupils: Pupils are equal, round, and reactive to light  Neck:      Musculoskeletal: Normal range of motion and neck supple  Cardiovascular:      Rate and Rhythm: Normal rate  Pulses: Normal pulses  Pulmonary:      Effort: Pulmonary effort is normal    Abdominal:      General: There is no distension  Palpations: Abdomen is soft  Tenderness: There is abdominal tenderness  There is no guarding  Genitourinary:     Comments: deferred  Musculoskeletal: Normal range of motion  Skin:     General: Skin is warm  Capillary Refill: Capillary refill takes 2 to 3 seconds  Neurological:      General: No focal deficit present  Mental Status: She is alert  Psychiatric:         Mood and Affect: Mood normal          Lab Results:   I have personally reviewed pertinent reports  , Coags: No results found for: PT, PTT, INR, Creatinine:   Lab Results   Component Value Date    CREATININE 1 03 01/02/2021   , Lipid Panel: No results found for: CHOL, CBC with diff:   Lab Results   Component Value Date    WBC 13 29 (H) 01/02/2021    HGB 12 1 01/02/2021    HCT 38 3 01/02/2021    MCV 84 01/02/2021     (H) 01/02/2021    MCH 26 4 (L) 01/02/2021    MCHC 31 6 01/02/2021    RDW 14 4 01/02/2021    MPV 9 6 01/02/2021    NRBC 0 01/02/2021   , BMP/CMP:   Lab Results   Component Value Date    SODIUM 131 (L) 01/02/2021    K 3 8 01/02/2021    CL 95 (L) 01/02/2021    CO2 25 01/02/2021    BUN 15 01/02/2021    CREATININE 1 03 01/02/2021    CALCIUM 8 8 01/02/2021    AST 29 01/02/2021    ALT 18 01/02/2021    ALKPHOS 87 01/02/2021    EGFR 52 01/02/2021     Imaging Studies: I have personally reviewed pertinent reports      EKG, Pathology, and Other Studies: I have personally reviewed pertinent reports  VTE Prophylaxis: Sequential compression device Yadiravalerie Justice)      Code Status: No Order  Advance Directive and Living Will: Yes    Power of :    POLST:      Counseling / Coordination of Care  Counseling/Coordination of Care: Total floor / unit time spent today 30 minutes  Greater than 50% of total time was spent with the patient and / or family counseling and / or coordination of care   A description of the counseling / coordination of care: 30

## 2021-01-03 NOTE — H&P (VIEW-ONLY)
INTERPROFESSIONAL (PHONE) CONSULTATION - Interventional Radiology  Ilya Harp 79 y o  female MRN: [id number]  Unit/Bed#: [facility] 913-01 Encounter: [id number]    IR has been consulted to evaluate the patient, determine the appropriate procedure, and whether or not a procedure can and should be performed regarding the care of Ilya Harp  We were consulted by surgery concerning this patient, and to possibly perform a drainage catheter if medically appropriate for the patient  Consults  01/03/21      Assessment/Recommendation:   79 yoF s/p hemicolectomy c/b pelvic fluid collection with concern for abscess  IR consulted for possible drainage  Will plan for CT guided drainage catheter placement tomorrow pending schedule availability  Please hold tomorrow's lovenox  Make NPO at MN  Total time spent in review of data, discussion with requesting provider and rendering advice was 15 minutes  Patient or appropriate family member was verbally informed by 15 minutes of this consultative service on their behalf to provide more timely access to specialty care in lieu of an in person consultation  They were informed it is a billable service unless the it was determined an in person follow up was medically necessary by me within the next 14 days at which time only the in person consult would be billed  Verbal consent was obtained  Thank you for allowing Interventional Radiology to participate in the care of Ilya Harp  Please don't hesitate to call or TigerText us with any questions       Tami Barron MD

## 2021-01-03 NOTE — ED NOTES
Patient incontinent of urine  New linens changes and brief placed on patient at this time        Tobias Loyola RN  01/03/21 9652

## 2021-01-03 NOTE — EMTALA/ACUTE CARE TRANSFER
[address]  [facility] [address]  Dept: [phone]      EMTALA TRANSFER CONSENT    NAME Josr Castañeda                                         1940                              MRN [id number]    I have been informed of my rights regarding examination, treatment, and transfer   by Dr Tyrel Miles DO    Benefits: Specialized equipment and/or services available at the receiving facility (Include comment)________________________colorectal/IR    Risks: Potential for delay in receiving treatment      Consent for Transfer:  I acknowledge that my medical condition has been evaluated and explained to me by the emergency department physician or other qualified medical person and/or my attending physician, who has recommended that I be transferred to the service of  Accepting Physician: [address] at [address] Name, [address] : [facility]  The above potential benefits of such transfer, the potential risks associated with such transfer, and the probable risks of not being transferred have been explained to me, and I fully understand them  The doctor has explained that, in my case, the benefits of transfer outweigh the risks  I agree to be transferred  I authorize the performance of emergency medical procedures and treatments upon me in both transit and upon arrival at the receiving facility  Additionally, I authorize the release of any and all medical records to the receiving facility and request they be transported with me, if possible  I understand that the safest mode of transportation during a medical emergency is an ambulance and that the Hospital advocates the use of this mode of transport  Risks of traveling to the receiving facility by car, including absence of medical control, life sustaining equipment, such as oxygen, and medical personnel has been explained to me and I fully understand them      (KRISTIE CORRECT BOX BELOW)  [ x ]  I consent to the stated transfer and to be transported by ambulance/helicopter  [  ]  I consent to the stated transfer, but refuse transportation by ambulance and accept full responsibility for my transportation by car  I understand the risks of non-ambulance transfers and I exonerate the Hospital and its staff from any deterioration in my condition that results from this refusal     X___________________________________________    DATE  21  TIME________  Signature of patient or legally responsible individual signing on patient behalf           RELATIONSHIP TO PATIENT_________________________          Provider Certification    NAME Santos Cockayne                                         1940                              MRN [id number]    A medical screening exam was performed on the above named patient  Based on the examination:    Condition Necessitating Transfer The primary encounter diagnosis was Intra-abdominal collection  A diagnosis of Bilateral hydronephrosis was also pertinent to this visit  Patient Condition: The patient has been stabilized such that within reasonable medical probability, no material deterioration of the patient condition or the condition of the unborn child(katelynn) is likely to result from the transfer    Reason for Transfer: Level of Care needed not available at this facility    Transfer Requirements: Facility [facility]   · Space available and qualified personnel available for treatment as acknowledged by    · Agreed to accept transfer and to provide appropriate medical treatment as acknowledged by       Alexx  · Appropriate medical records of the examination and treatment of the patient are provided at the time of transfer   [address], [address] _______  · Transfer will be performed by qualified personnel from    and appropriate transfer equipment as required, including the use of necessary and appropriate life support measures      Provider Certification: I have examined the patient and explained the following risks and benefits of being transferred/refusing transfer to the patient/family:  General risk, such as traffic hazards, adverse weather conditions, rough terrain or turbulence, possible failure of equipment (including vehicle or aircraft), or consequences of actions of persons outside the control of the transport personnel, Unanticipated needs of medical equipment and personnel during transport      Based on these reasonable risks and benefits to the patient and/or the unborn child(katelynn), and based upon the information available at the time of the patients examination, I certify that the medical benefits reasonably to be expected from the provision of appropriate medical treatments at another medical facility outweigh the increasing risks, if any, to the individuals medical condition, and in the case of labor to the unborn child, from effecting the transfer      X____________________________________________ DATE 01/02/21        TIME_______      ORIGINAL - SEND TO MEDICAL RECORDS   COPY - SEND WITH PATIENT DURING TRANSFER

## 2021-01-04 ENCOUNTER — APPOINTMENT (INPATIENT)
Dept: RADIOLOGY | Facility: HOSPITAL | Age: 81
DRG: 862 | End: 2021-01-04
Attending: STUDENT IN AN ORGANIZED HEALTH CARE EDUCATION/TRAINING PROGRAM
Payer: MEDICARE

## 2021-01-04 PROBLEM — N94.9 ADNEXAL CYST: Status: ACTIVE | Noted: 2021-01-04

## 2021-01-04 PROBLEM — T81.43XA ABSCESS, INTRA-ABDOMINAL, POSTOPERATIVE: Status: ACTIVE | Noted: 2021-01-04

## 2021-01-04 PROBLEM — N20.0 NEPHROLITHIASIS: Status: ACTIVE | Noted: 2021-01-04

## 2021-01-04 PROBLEM — C18.9 ADENOCARCINOMA OF COLON (HCC): Status: ACTIVE | Noted: 2021-01-04

## 2021-01-04 PROBLEM — K65.1 ABSCESS, INTRA-ABDOMINAL, POSTOPERATIVE (HCC): Status: ACTIVE | Noted: 2021-01-04

## 2021-01-04 PROBLEM — R55 SYNCOPE: Status: ACTIVE | Noted: 2021-01-04

## 2021-01-04 LAB
ANION GAP SERPL CALCULATED.3IONS-SCNC: 9 MMOL/L (ref 4–13)
BASOPHILS # BLD AUTO: 0.04 THOUSANDS/ΜL (ref 0–0.1)
BASOPHILS # BLD AUTO: 0.06 THOUSANDS/ΜL (ref 0–0.1)
BASOPHILS NFR BLD AUTO: 0 % (ref 0–1)
BASOPHILS NFR BLD AUTO: 1 % (ref 0–1)
BUN SERPL-MCNC: 7 MG/DL (ref 5–25)
CALCIUM SERPL-MCNC: 8.9 MG/DL (ref 8.3–10.1)
CHLORIDE SERPL-SCNC: 103 MMOL/L (ref 100–108)
CO2 SERPL-SCNC: 25 MMOL/L (ref 21–32)
CREAT SERPL-MCNC: 0.5 MG/DL (ref 0.6–1.3)
EOSINOPHIL # BLD AUTO: 0.02 THOUSAND/ΜL (ref 0–0.61)
EOSINOPHIL # BLD AUTO: 0.14 THOUSAND/ΜL (ref 0–0.61)
EOSINOPHIL NFR BLD AUTO: 0 % (ref 0–6)
EOSINOPHIL NFR BLD AUTO: 1 % (ref 0–6)
ERYTHROCYTE [DISTWIDTH] IN BLOOD BY AUTOMATED COUNT: 14.3 % (ref 11.6–15.1)
ERYTHROCYTE [DISTWIDTH] IN BLOOD BY AUTOMATED COUNT: 14.4 % (ref 11.6–15.1)
GFR SERPL CREATININE-BSD FRML MDRD: 92 ML/MIN/1.73SQ M
GLUCOSE SERPL-MCNC: 100 MG/DL (ref 65–140)
GLUCOSE SERPL-MCNC: 103 MG/DL (ref 65–140)
GLUCOSE SERPL-MCNC: 121 MG/DL (ref 65–140)
GLUCOSE SERPL-MCNC: 123 MG/DL (ref 65–140)
GLUCOSE SERPL-MCNC: 165 MG/DL (ref 65–140)
HCT VFR BLD AUTO: 35.3 % (ref 34.8–46.1)
HCT VFR BLD AUTO: 36.9 % (ref 34.8–46.1)
HGB BLD-MCNC: 11.2 G/DL (ref 11.5–15.4)
HGB BLD-MCNC: 11.7 G/DL (ref 11.5–15.4)
IMM GRANULOCYTES # BLD AUTO: 0.06 THOUSAND/UL (ref 0–0.2)
IMM GRANULOCYTES # BLD AUTO: 0.06 THOUSAND/UL (ref 0–0.2)
IMM GRANULOCYTES NFR BLD AUTO: 0 % (ref 0–2)
IMM GRANULOCYTES NFR BLD AUTO: 1 % (ref 0–2)
LACTATE SERPL-SCNC: 1.2 MMOL/L (ref 0.5–2)
LYMPHOCYTES # BLD AUTO: 0.87 THOUSANDS/ΜL (ref 0.6–4.47)
LYMPHOCYTES # BLD AUTO: 1.7 THOUSANDS/ΜL (ref 0.6–4.47)
LYMPHOCYTES NFR BLD AUTO: 17 % (ref 14–44)
LYMPHOCYTES NFR BLD AUTO: 6 % (ref 14–44)
MAGNESIUM SERPL-MCNC: 1.6 MG/DL (ref 1.6–2.6)
MAGNESIUM SERPL-MCNC: 2 MG/DL (ref 1.6–2.6)
MCH RBC QN AUTO: 26.5 PG (ref 26.8–34.3)
MCH RBC QN AUTO: 26.5 PG (ref 26.8–34.3)
MCHC RBC AUTO-ENTMCNC: 31.7 G/DL (ref 31.4–37.4)
MCHC RBC AUTO-ENTMCNC: 31.7 G/DL (ref 31.4–37.4)
MCV RBC AUTO: 84 FL (ref 82–98)
MCV RBC AUTO: 84 FL (ref 82–98)
MONOCYTES # BLD AUTO: 1.12 THOUSAND/ΜL (ref 0.17–1.22)
MONOCYTES # BLD AUTO: 1.21 THOUSAND/ΜL (ref 0.17–1.22)
MONOCYTES NFR BLD AUTO: 11 % (ref 4–12)
MONOCYTES NFR BLD AUTO: 8 % (ref 4–12)
NEUTROPHILS # BLD AUTO: 12.47 THOUSANDS/ΜL (ref 1.85–7.62)
NEUTROPHILS # BLD AUTO: 7 THOUSANDS/ΜL (ref 1.85–7.62)
NEUTS SEG NFR BLD AUTO: 69 % (ref 43–75)
NEUTS SEG NFR BLD AUTO: 86 % (ref 43–75)
NRBC BLD AUTO-RTO: 0 /100 WBCS
NRBC BLD AUTO-RTO: 0 /100 WBCS
PHOSPHATE SERPL-MCNC: 2.2 MG/DL (ref 2.3–4.1)
PLATELET # BLD AUTO: 392 THOUSANDS/UL (ref 149–390)
PLATELET # BLD AUTO: 428 THOUSANDS/UL (ref 149–390)
PMV BLD AUTO: 9.3 FL (ref 8.9–12.7)
PMV BLD AUTO: 9.4 FL (ref 8.9–12.7)
POTASSIUM SERPL-SCNC: 4.1 MMOL/L (ref 3.5–5.3)
RBC # BLD AUTO: 4.22 MILLION/UL (ref 3.81–5.12)
RBC # BLD AUTO: 4.41 MILLION/UL (ref 3.81–5.12)
SODIUM SERPL-SCNC: 137 MMOL/L (ref 136–145)
T4 FREE SERPL-MCNC: 1.28 NG/DL (ref 0.76–1.46)
TROPONIN I SERPL-MCNC: <0.02 NG/ML
TSH SERPL DL<=0.05 MIU/L-ACNC: 11 UIU/ML (ref 0.36–3.74)
WBC # BLD AUTO: 10.08 THOUSAND/UL (ref 4.31–10.16)
WBC # BLD AUTO: 14.67 THOUSAND/UL (ref 4.31–10.16)

## 2021-01-04 PROCEDURE — 87075 CULTR BACTERIA EXCEPT BLOOD: CPT | Performed by: RADIOLOGY

## 2021-01-04 PROCEDURE — 87205 SMEAR GRAM STAIN: CPT | Performed by: RADIOLOGY

## 2021-01-04 PROCEDURE — 87185 SC STD ENZYME DETCJ PER NZM: CPT | Performed by: RADIOLOGY

## 2021-01-04 PROCEDURE — 8E0WXBG COMPUTER ASSISTED PROCEDURE OF TRUNK REGION, WITH COMPUTERIZED TOMOGRAPHY: ICD-10-PCS | Performed by: RADIOLOGY

## 2021-01-04 PROCEDURE — NC001 PR NO CHARGE: Performed by: ANESTHESIOLOGY

## 2021-01-04 PROCEDURE — 83605 ASSAY OF LACTIC ACID: CPT | Performed by: PHYSICIAN ASSISTANT

## 2021-01-04 PROCEDURE — 85025 COMPLETE CBC W/AUTO DIFF WBC: CPT | Performed by: PHYSICIAN ASSISTANT

## 2021-01-04 PROCEDURE — G9197 ORDER FOR CEPH: HCPCS | Performed by: RADIOLOGY

## 2021-01-04 PROCEDURE — 83735 ASSAY OF MAGNESIUM: CPT | Performed by: PHYSICIAN ASSISTANT

## 2021-01-04 PROCEDURE — 87186 SC STD MICRODIL/AGAR DIL: CPT | Performed by: RADIOLOGY

## 2021-01-04 PROCEDURE — 87077 CULTURE AEROBIC IDENTIFY: CPT | Performed by: RADIOLOGY

## 2021-01-04 PROCEDURE — 99222 1ST HOSP IP/OBS MODERATE 55: CPT | Performed by: PHYSICIAN ASSISTANT

## 2021-01-04 PROCEDURE — 83735 ASSAY OF MAGNESIUM: CPT | Performed by: SURGERY

## 2021-01-04 PROCEDURE — 84484 ASSAY OF TROPONIN QUANT: CPT | Performed by: PHYSICIAN ASSISTANT

## 2021-01-04 PROCEDURE — 49406 IMAGE CATH FLUID PERI/RETRO: CPT | Performed by: RADIOLOGY

## 2021-01-04 PROCEDURE — 49406 IMAGE CATH FLUID PERI/RETRO: CPT

## 2021-01-04 PROCEDURE — 93005 ELECTROCARDIOGRAM TRACING: CPT

## 2021-01-04 PROCEDURE — 87076 CULTURE ANAEROBE IDENT EACH: CPT | Performed by: RADIOLOGY

## 2021-01-04 PROCEDURE — 80048 BASIC METABOLIC PNL TOTAL CA: CPT | Performed by: SURGERY

## 2021-01-04 PROCEDURE — C1769 GUIDE WIRE: HCPCS

## 2021-01-04 PROCEDURE — 85025 COMPLETE CBC W/AUTO DIFF WBC: CPT | Performed by: SURGERY

## 2021-01-04 PROCEDURE — 82948 REAGENT STRIP/BLOOD GLUCOSE: CPT

## 2021-01-04 PROCEDURE — C1729 CATH, DRAINAGE: HCPCS

## 2021-01-04 PROCEDURE — 84100 ASSAY OF PHOSPHORUS: CPT | Performed by: SURGERY

## 2021-01-04 PROCEDURE — 99152 MOD SED SAME PHYS/QHP 5/>YRS: CPT

## 2021-01-04 PROCEDURE — 0W9G30Z DRAINAGE OF PERITONEAL CAVITY WITH DRAINAGE DEVICE, PERCUTANEOUS APPROACH: ICD-10-PCS | Performed by: RADIOLOGY

## 2021-01-04 PROCEDURE — 84443 ASSAY THYROID STIM HORMONE: CPT | Performed by: PHYSICIAN ASSISTANT

## 2021-01-04 PROCEDURE — 87070 CULTURE OTHR SPECIMN AEROBIC: CPT | Performed by: RADIOLOGY

## 2021-01-04 PROCEDURE — 84439 ASSAY OF FREE THYROXINE: CPT | Performed by: PHYSICIAN ASSISTANT

## 2021-01-04 PROCEDURE — 99153 MOD SED SAME PHYS/QHP EA: CPT

## 2021-01-04 PROCEDURE — 99152 MOD SED SAME PHYS/QHP 5/>YRS: CPT | Performed by: RADIOLOGY

## 2021-01-04 RX ORDER — LIDOCAINE WITH 8.4% SOD BICARB 0.9%(10ML)
SYRINGE (ML) INJECTION CODE/TRAUMA/SEDATION MEDICATION
Status: COMPLETED | OUTPATIENT
Start: 2021-01-04 | End: 2021-01-04

## 2021-01-04 RX ORDER — SODIUM CHLORIDE 9 MG/ML
10 INJECTION INTRAVENOUS DAILY
Qty: 30 SYRINGE | Refills: 3 | Status: SHIPPED | OUTPATIENT
Start: 2021-01-04 | End: 2021-01-07 | Stop reason: SDUPTHER

## 2021-01-04 RX ORDER — CEFAZOLIN SODIUM 1 G/3ML
INJECTION, POWDER, FOR SOLUTION INTRAMUSCULAR; INTRAVENOUS CODE/TRAUMA/SEDATION MEDICATION
Status: COMPLETED | OUTPATIENT
Start: 2021-01-04 | End: 2021-01-04

## 2021-01-04 RX ORDER — FENTANYL CITRATE 50 UG/ML
INJECTION, SOLUTION INTRAMUSCULAR; INTRAVENOUS CODE/TRAUMA/SEDATION MEDICATION
Status: COMPLETED | OUTPATIENT
Start: 2021-01-04 | End: 2021-01-04

## 2021-01-04 RX ORDER — MIDAZOLAM HYDROCHLORIDE 2 MG/2ML
INJECTION, SOLUTION INTRAMUSCULAR; INTRAVENOUS CODE/TRAUMA/SEDATION MEDICATION
Status: COMPLETED | OUTPATIENT
Start: 2021-01-04 | End: 2021-01-04

## 2021-01-04 RX ADMIN — METOPROLOL TARTRATE 50 MG: 50 TABLET, FILM COATED ORAL at 08:40

## 2021-01-04 RX ADMIN — MIDAZOLAM 0.5 MG: 1 INJECTION INTRAMUSCULAR; INTRAVENOUS at 13:55

## 2021-01-04 RX ADMIN — FENTANYL CITRATE 25 MCG: 50 INJECTION INTRAMUSCULAR; INTRAVENOUS at 13:55

## 2021-01-04 RX ADMIN — INSULIN LISPRO 1 UNITS: 100 INJECTION, SOLUTION INTRAVENOUS; SUBCUTANEOUS at 23:24

## 2021-01-04 RX ADMIN — ASPIRIN 81 MG: 81 TABLET ORAL at 08:40

## 2021-01-04 RX ADMIN — CEFAZOLIN 1000 MG: 1 INJECTION, POWDER, FOR SOLUTION INTRAMUSCULAR; INTRAVENOUS at 13:05

## 2021-01-04 RX ADMIN — METRONIDAZOLE 500 MG: 500 INJECTION, SOLUTION INTRAVENOUS at 13:45

## 2021-01-04 RX ADMIN — MIDAZOLAM 1 MG: 1 INJECTION INTRAMUSCULAR; INTRAVENOUS at 14:00

## 2021-01-04 RX ADMIN — AMLODIPINE BESYLATE 5 MG: 5 TABLET ORAL at 08:40

## 2021-01-04 RX ADMIN — METOPROLOL TARTRATE 50 MG: 50 TABLET, FILM COATED ORAL at 23:22

## 2021-01-04 RX ADMIN — Medication 10 ML: at 14:00

## 2021-01-04 RX ADMIN — MAGNESIUM SULFATE HEPTAHYDRATE 3 G: 500 INJECTION, SOLUTION INTRAMUSCULAR; INTRAVENOUS at 08:39

## 2021-01-04 RX ADMIN — FENTANYL CITRATE 50 MCG: 50 INJECTION INTRAMUSCULAR; INTRAVENOUS at 14:01

## 2021-01-04 NOTE — RAPID RESPONSE
Progress Note - Rapid Response   Corinne Thomas 79 y o  female MRN: [id number]    Time Called ( Time): 1735  Date Called: 1/4/2021  Level of Care: MS  Room#: 894  [id number] Time ( Time): 4538  Event End Time ( Time): 8119    Primary reason for call: Syncope  Interventions:  Airway/Breathing:  O2 Mask/Nasal  Circulation: EKG  Other Treatments: N/A       Assessment:   1  Syncope with ambulation from the bathroom  Patient administered general anesthesia for intra-abdominal abscess drainage  Differential includes vasovagal vs arrhthymia  Will continue to monitor on telemetry monitoring at this time  Plan:   · Labs sent including CBC, BMP, LA, Troponins  · Repeat EKG  · Echo ordered to be completed in AM  · Continue to monitor on Telemetry monitoring       HPI/Chief Complaint (Background/Situation):   Corinne Thomas is a 79y o  year old female with PMHx of HTN, DMII, COPD, CRC s/o left laparoscopic hemicolectomy on 12/18/2020 presenting with abdominal pain and lethargy in setting of a large intraabdominal abscess  Patient s/p intraabdominal abscess drainage on 1/4/2021 completed by IR  RRT called at approx 1725 after patient had a witnessed syncopal episode while ambulating with assistance from the bathroom to her bed  No head strike  On arrival to patient's room, patient placed lying in bed with non-rebreather in placed, saturating at 100%  Patient hemodynamically stable, participating in conversation  /75  HR 85  AAOx2 with no FND  Glucose 160s  EKG completed at bedside demonstrating no presence of ST segment elevations or T wave changes    Patient transitioned to baseline 2L NC with 100% saturation  Labs obtained and sent  Patient's son, Paulie Soliman, contacted and notified of rapid response  Informed by the son that patient's two son have a history of sudden cardiac arrest  One resting in sudden death   Patient's two sons have been evaluated and underwent genetic testing and were told that they have a condition which predisposes them to arrhythmias  Two brothers have a history of sudden cardiac arrest  Patient could not recall exact name of genetic condition at this time       Historical Information   Past Medical History:   Diagnosis Date    [facility] )     COPD (chronic obstructive pulmonary disease) ([facility] )     Diabetes mellitus ([facility] )     Hypertension     On home oxygen therapy     2L @ hs & with exertional activity      Past Surgical History:   Procedure Laterality Date    CATARACT EXTRACTION      CORONARY ANGIOPLASTY WITH STENT PLACEMENT      placed [phone]    EXTRACORPOREAL SHOCK WAVE LITHOTRIPSY      Jakobi 69 W/ ANASTOMOSIS Left 12/18/2020    Procedure: LAPAROSCOPIC HEMICOLECTOMY, TAKE DOWN OF SPLENIC FLEXURE, COLORECTAL ANASTAMOSIS;  Surgeon: Isidro Murray MD;  [facility];  Service: Colorectal    HYSTERECTOMY      KIDNEY SURGERY      'growth removed'     NERVE SURGERY      R arm     TONSILLECTOMY      TUBAL LIGATION       Social History   Social History     Substance and Sexual Activity   Alcohol Use Not Currently     Social History     Substance and Sexual Activity   Drug Use Never     Social History     Tobacco Use   Smoking Status Former Smoker   Smokeless Tobacco Never Used   Tobacco Comment    quit 2000      Family History:   Family History   Problem Relation Age of Onset    Cancer Mother     Diabetes Father     Hypertension Father     Heart attack Father     Diabetes Sister     Cancer Sister     Heart disease Brother     Stroke Maternal Grandmother        Meds/Allergies   Current Facility-Administered Medications   Medication Dose Route Frequency Provider Last Rate    albuterol  2 puff Inhalation Q4H PRN Isidro Murrya MD      amLODIPine  5 mg Oral Daily Marko Mcgowan DO      aspirin  81 mg Oral Daily Elza Villanueva MD      enoxaparin  40 mg Subcutaneous Daily Shruti Levine,       HYDROmorphone  0 2 mg Intravenous Q3H PRN Clarita Beltran MD      insulin lispro  1-5 Units Subcutaneous Q6H [facility] Clarita Beltran MD      lactated ringers  75 mL/hr Intravenous Continuous Sherryle Faster, DO 75 mL/hr (01/03/21 2237)    metoprolol tartrate  50 mg Oral Q12H Phillipton P Julianna, DO      ondansetron  4 mg Intravenous Q4H PRN Clarita Beltran MD      oxyCODONE  2 5 mg Oral Q4H PRN Clarita Beltran MD      oxyCODONE  5 mg Oral Q4H PRN Clarita Beltran MD      potassium-sodium phosphates  2 packet Oral BID Erickson Cooper MD      pravastatin  20 mg Oral Daily With Dinner Alicia Corolla P [place], DO         lactated ringers, 75 mL/hr, Last Rate: 75 mL/hr (01/03/21 2237)        Allergies   Allergen Reactions    Demerol [Meperidine] Other (See Comments)     'eyes enlarged'        ROS: General ROS: negative    Vitals: BP: 150/75, HR 85    Physical Exam:  Gen: Patient lethargic, participating in conversation  Responsive to noxious stimuli  HEENT: AT/NC, EOMI  Neck: No JVD  Chest: clear lung sounds b/l  RRR with no murmurs or bruits  Abd: nondistended, nontender to palpation  Ext:+2 pulses b/l  Neuro: no FND  Skin: warm with appropriate skin turgor      Intake/Output Summary (Last 24 hours) at 1/4/2021 1741  Last data filed at 1/4/2021 1720  Gross per 24 hour   Intake 320 ml   Output 1456 ml   Net -1136 ml       Respiratory    Lab Data (Last 4 hours)    None         O2/Vent Data (Last 4 hours)    None              Invasive Devices     Peripheral Intravenous Line            Peripheral IV 01/02/21 Left;Ventral (anterior) Forearm 1 day          Drain            External Urinary Catheter 14 days    Closed/Suction Drain Right Back Bulb 10 2 Fr  less than 1 day                DIAGNOSTIC DATA:    Lab: I have personally reviewed pertinent lab results     CBC:   Results from last 7 days   Lab Units 01/04/21  0552   WBC Thousand/uL 10 08   HEMOGLOBIN g/dL 11 7   HEMATOCRIT % 36 9   PLATELETS Thousands/uL 428*     CMP: Results from last 7 days   Lab Units 01/04/21  0552 01/02/21  1936   POTASSIUM mmol/L 4 1 3 8   CHLORIDE mmol/L 103 95*   CO2 mmol/L 25 25   BUN mg/dL 7 15   CREATININE mg/dL 0 50* 1 03   CALCIUM mg/dL 8 9 8 8   ALK PHOS U/L  --  87   ALT U/L  --  18   AST U/L  --  29     PT/INR:   No results found for: PT, INR,   Magnesium: No components found for: MAG,   Phosphorous:   Lab Results   Component Value Date    PHOS 2 2 (L) 01/04/2021       Microbiology:  No results found for: [place], SPUTUMCULTUR      OUTCOME:   Stayed in room , placed on telemetry  Family notified of transfer: No   Family member contacted: Wing Martínez, notified of Rapid Response  Code Status: Level 1 - Full Code  Critical Care Time: Total Critical Care time spent 30 minutes excluding procedures, teaching and family updates

## 2021-01-04 NOTE — SEDATION DOCUMENTATION
Drain placed to R transgluteal by Dr Hien Hill  Pt tolerated well without complications  Report called to Los david, RN

## 2021-01-04 NOTE — RESPIRATORY THERAPY NOTE
RT Protocol Note  Chely Chol 79 y o  female MRN: [id number]  Unit/Bed#: [facility] 913-01 Encounter: [id number]    Assessment    Active Problems:    * No active hospital problems  *      Home Pulmonary Medications:         Past Medical History:   Diagnosis Date    Cancer ([facility] )     COPD [facility]chronic obstructive pulmonary disease) ([facility] )     Diabetes mellitus ([place] [address] )     Hypertension     On home oxygen therapy     2L @ hs & with exertional activity      Social History     Socioeconomic History    Marital status:       Spouse name: Not on file    Number of children: Not on file    Years of education: Not on file    Highest education level: Not on file   Occupational History    Not on file   Social Needs    Financial resource strain: Not on file    Food insecurity     Worry: Not on file     Inability: Not on file    Transportation needs     Medical: Not on file     Non-medical: Not on file   Tobacco Use    Smoking status: Former Smoker    Smokeless tobacco: Never Used    Tobacco comment: quit 2000    Substance and Sexual Activity    Alcohol use: Not Currently    Drug use: Never    Sexual activity: Not Currently   Lifestyle    Physical activity     Days per week: Not on file     Minutes per session: Not on file    Stress: Not on file   Relationships    Social connections     Talks on phone: Not on file     Gets together: Not on file     Attends Alevism service: Not on file     Active member of club or organization: Not on file     Attends meetings of clubs or organizations: Not on file     Relationship status: Not on file    Intimate partner violence     Fear of current or ex partner: Not on file     Emotionally abused: Not on file     Physically abused: Not on file     Forced sexual activity: Not on file   Other Topics Concern    Not on file   Social History Narrative    Not on file       Subjective         Objective    Physical Exam:   Assessment Type: (P) Assess only  General Appearance: (P) Awake  Respiratory Pattern: (P) Dyspnea with exertion  Chest Assessment: (P) Chest expansion symmetrical  Cough: (P) None    Vitals:  Blood pressure 158/74, pulse 94, temperature 98 9 °F (37 2 °C), resp  rate 20, SpO2 93 %  Imaging and other studies: I have personally reviewed pertinent reports  01/03/21 2300   Respiratory Protocol   Protocol Initiated? No   Protocol Selection Respiratory   Language Barrier? No   Medical & Social History Reviewed? Yes   Diagnostic Studies Reviewed? Yes   Physical Assessment Performed? Yes   Respiratory Plan Home Bronchodilator Patient pathway   Respiratory Assessment   Assessment Type Assess only   General Appearance Awake   Respiratory Pattern Dyspnea with exertion   Chest Assessment Chest expansion symmetrical   Bilateral Breath Sounds Clear;Diminished   Cough None   Resp Comments Pt was reassessed for respiratory protocol  At this time, pt seem comfortable  No distress or SoB noted  Will keep pt on prn albuterol inhaler at this time  Plan    Respiratory Plan: (P) Home Bronchodilator Patient pathway        Resp Comments: (P) Pt was reassessed for respiratory protocol  At this time, pt seem comfortable  No distress or SoB noted  Will keep pt on prn albuterol inhaler at this time

## 2021-01-04 NOTE — PROGRESS NOTES
Progress Note - Josephine Singer 79 y o  female MRN: [id number]    Unit/Bed#: [facility] 913-01 Encounter: [id number]      Assessment:  Pt is a 78 y/o F s/p L hemicolectomy on 12/18 c/w large intrabdominal abscess  Vss  Afebrile  abd soft, moderate tenderness in b/l lower quadrants  Non distended  No guarding  Plan:  Npo  Hold AM lovenox  Plan IR drainage of intraabdominal abscess today  Prn pain and nausea control    Subjective:   Feels ok except persistent lower abd pain  Denied passing flatus  Denied chest pain or shortness of breath  Denied nausea or vomiting  No fever or chills  Objective:     Vitals: Blood pressure 158/74, pulse 94, temperature 98 9 °F (37 2 °C), resp  rate 20, SpO2 93 %  ,There is no height or weight on file to calculate BMI  Intake/Output Summary (Last 24 hours) at 1/4/2021 0536  Last data filed at 1/4/2021 0300  Gross per 24 hour   Intake 120 ml   Output 981 ml   Net -861 ml       Physical Exam  General: NAD  HEENT: NC/AT  MMM  Cv: RRR  Lungs: normal effort  Ab: Soft, NT/ND  Ex: no CCE  Neuro: AAOx3      Invasive Devices     Peripheral Intravenous Line            Peripheral IV 01/02/21 Left;Ventral (anterior) Forearm 1 day          Drain            External Urinary Catheter 14 days                Lab, Imaging and other studies: I have personally reviewed pertinent reports      VTE Pharmacologic Prophylaxis: Sequential compression device (Venodyne)   VTE Mechanical Prophylaxis: sequential compression device

## 2021-01-04 NOTE — CASE MANAGEMENT
30 day Readmission-Post Op Abscess  Met with pt, explained CM program/CM role  LOS-1  Bundle-no  Unplanned readmission color-green  Lives alone, 1 SH, 7 MARTHA front, 3 MARTHA back  IPTA for ADL's/ambulation, drives, retired  PCP D [facility]  Pharmacy[facility] [address]  Denies HHC-discussed with her potential for [facility] [address] if DC with drain, refused, states she can take care of any drains herself  Denies IP Rehab  DME: walker  Denies IP Rehab,   Denies MH illness, D&A abuse  Primary contact sons Jose Antonio Carr and Donna Burnett  Has POA-son [phone], has LW, on chart  Family will transport home    CM reviewed d/c planning process including the following: identifying help at home, patient preference for d/c planning needs, Discharge Lounge, [facility] Meds to Bed program, availability of treatment team to discuss questions or concerns patient and/or family may have regarding understanding medications and recognizing signs and symptoms once discharged  CM also encouraged patient to follow up with all recommended appointments after discharge  Patient advised of importance for patient and family to participate in managing patients medical well being  Patient/caregiver received discharge checklist  Content reviewed  Patient/caregiver encouraged to participate in discharge plan of care prior to discharge home

## 2021-01-04 NOTE — BRIEF OP NOTE (RAD/CATH)
INTERVENTIONAL RADIOLOGY PROCEDURE NOTE    Date: 1/4/2021    Procedure: CT GUIDED PERC DRAINAGE CATHETER PLACEMENT     Preoperative diagnosis:   1  Abscess, intra-abdominal, postoperative         Postoperative diagnosis: Same  Surgeon: Ambika Umanzor MD     Assistant: None  No qualified resident was available  Blood loss:  Trace    Specimens:  Sent for culture    Findings:  10 [place] drainage catheter placed into low pelvic collection via the right transgluteal route  200 mL brown pus was aspirated  Complications: None immediate      Anesthesia: conscious sedation

## 2021-01-04 NOTE — INTERVAL H&P NOTE
The history and physical were reviewed, along with progress notes, and the patient was examined  There are no changes since it was written      /77   Pulse 92   Temp 97 8 °F (36 6 °C)   Resp 22   SpO2 96%        Dada Head MD/January 4, 2021/1:12 PM

## 2021-01-05 ENCOUNTER — APPOINTMENT (INPATIENT)
Dept: NON INVASIVE DIAGNOSTICS | Facility: HOSPITAL | Age: 81
DRG: 862 | End: 2021-01-05
Payer: MEDICARE

## 2021-01-05 PROBLEM — E83.39 HYPOPHOSPHATEMIA: Status: ACTIVE | Noted: 2021-01-05

## 2021-01-05 LAB
ANION GAP SERPL CALCULATED.3IONS-SCNC: 10 MMOL/L (ref 4–13)
ATRIAL RATE: 300 BPM
ATRIAL RATE: 92 BPM
BASOPHILS # BLD AUTO: 0.04 THOUSANDS/ΜL (ref 0–0.1)
BASOPHILS NFR BLD AUTO: 0 % (ref 0–1)
BUN SERPL-MCNC: 6 MG/DL (ref 5–25)
CALCIUM SERPL-MCNC: 8.8 MG/DL (ref 8.3–10.1)
CHLORIDE SERPL-SCNC: 105 MMOL/L (ref 100–108)
CO2 SERPL-SCNC: 23 MMOL/L (ref 21–32)
CREAT SERPL-MCNC: 0.43 MG/DL (ref 0.6–1.3)
EOSINOPHIL # BLD AUTO: 0.07 THOUSAND/ΜL (ref 0–0.61)
EOSINOPHIL NFR BLD AUTO: 1 % (ref 0–6)
ERYTHROCYTE [DISTWIDTH] IN BLOOD BY AUTOMATED COUNT: 14.5 % (ref 11.6–15.1)
GFR SERPL CREATININE-BSD FRML MDRD: 97 ML/MIN/1.73SQ M
GLUCOSE SERPL-MCNC: 123 MG/DL (ref 65–140)
GLUCOSE SERPL-MCNC: 133 MG/DL (ref 65–140)
GLUCOSE SERPL-MCNC: 134 MG/DL (ref 65–140)
GLUCOSE SERPL-MCNC: 138 MG/DL (ref 65–140)
GLUCOSE SERPL-MCNC: 171 MG/DL (ref 65–140)
GLUCOSE SERPL-MCNC: 97 MG/DL (ref 65–140)
HCT VFR BLD AUTO: 32.7 % (ref 34.8–46.1)
HGB BLD-MCNC: 10.4 G/DL (ref 11.5–15.4)
IMM GRANULOCYTES # BLD AUTO: 0.06 THOUSAND/UL (ref 0–0.2)
IMM GRANULOCYTES NFR BLD AUTO: 1 % (ref 0–2)
LYMPHOCYTES # BLD AUTO: 1.43 THOUSANDS/ΜL (ref 0.6–4.47)
LYMPHOCYTES NFR BLD AUTO: 12 % (ref 14–44)
MCH RBC QN AUTO: 26.9 PG (ref 26.8–34.3)
MCHC RBC AUTO-ENTMCNC: 31.8 G/DL (ref 31.4–37.4)
MCV RBC AUTO: 85 FL (ref 82–98)
MONOCYTES # BLD AUTO: 1.06 THOUSAND/ΜL (ref 0.17–1.22)
MONOCYTES NFR BLD AUTO: 9 % (ref 4–12)
NEUTROPHILS # BLD AUTO: 8.93 THOUSANDS/ΜL (ref 1.85–7.62)
NEUTS SEG NFR BLD AUTO: 77 % (ref 43–75)
NRBC BLD AUTO-RTO: 0 /100 WBCS
PHOSPHATE SERPL-MCNC: 2.7 MG/DL (ref 2.3–4.1)
PLATELET # BLD AUTO: 386 THOUSANDS/UL (ref 149–390)
PMV BLD AUTO: 9.9 FL (ref 8.9–12.7)
POTASSIUM SERPL-SCNC: 3.7 MMOL/L (ref 3.5–5.3)
QRS AXIS: 80 DEGREES
QRS AXIS: 83 DEGREES
QRSD INTERVAL: 76 MS
QRSD INTERVAL: 86 MS
QT INTERVAL: 350 MS
QT INTERVAL: 382 MS
QTC INTERVAL: 432 MS
QTC INTERVAL: 457 MS
RBC # BLD AUTO: 3.86 MILLION/UL (ref 3.81–5.12)
SODIUM SERPL-SCNC: 138 MMOL/L (ref 136–145)
T WAVE AXIS: 66 DEGREES
T WAVE AXIS: 74 DEGREES
VENTRICULAR RATE: 86 BPM
VENTRICULAR RATE: 92 BPM
WBC # BLD AUTO: 11.59 THOUSAND/UL (ref 4.31–10.16)

## 2021-01-05 PROCEDURE — 84100 ASSAY OF PHOSPHORUS: CPT | Performed by: INTERNAL MEDICINE

## 2021-01-05 PROCEDURE — 99232 SBSQ HOSP IP/OBS MODERATE 35: CPT | Performed by: INTERNAL MEDICINE

## 2021-01-05 PROCEDURE — 93010 ELECTROCARDIOGRAM REPORT: CPT | Performed by: INTERNAL MEDICINE

## 2021-01-05 PROCEDURE — 99232 SBSQ HOSP IP/OBS MODERATE 35: CPT | Performed by: PHYSICIAN ASSISTANT

## 2021-01-05 PROCEDURE — 80048 BASIC METABOLIC PNL TOTAL CA: CPT | Performed by: SURGERY

## 2021-01-05 PROCEDURE — 93306 TTE W/DOPPLER COMPLETE: CPT | Performed by: INTERNAL MEDICINE

## 2021-01-05 PROCEDURE — 93306 TTE W/DOPPLER COMPLETE: CPT

## 2021-01-05 PROCEDURE — 85025 COMPLETE CBC W/AUTO DIFF WBC: CPT | Performed by: SURGERY

## 2021-01-05 PROCEDURE — 99222 1ST HOSP IP/OBS MODERATE 55: CPT | Performed by: INTERNAL MEDICINE

## 2021-01-05 PROCEDURE — 82948 REAGENT STRIP/BLOOD GLUCOSE: CPT

## 2021-01-05 RX ORDER — ACETAMINOPHEN 325 MG/1
650 TABLET ORAL EVERY 6 HOURS PRN
Status: DISCONTINUED | OUTPATIENT
Start: 2021-01-05 | End: 2021-01-07 | Stop reason: HOSPADM

## 2021-01-05 RX ORDER — OXYCODONE HYDROCHLORIDE 5 MG/1
2.5 TABLET ORAL EVERY 4 HOURS PRN
Status: DISCONTINUED | OUTPATIENT
Start: 2021-01-05 | End: 2021-01-07 | Stop reason: HOSPADM

## 2021-01-05 RX ORDER — POTASSIUM CHLORIDE 20 MEQ/1
20 TABLET, EXTENDED RELEASE ORAL ONCE
Status: COMPLETED | OUTPATIENT
Start: 2021-01-05 | End: 2021-01-05

## 2021-01-05 RX ORDER — LOPERAMIDE HYDROCHLORIDE 2 MG/1
2 CAPSULE ORAL EVERY 4 HOURS PRN
Status: DISCONTINUED | OUTPATIENT
Start: 2021-01-05 | End: 2021-01-07 | Stop reason: HOSPADM

## 2021-01-05 RX ORDER — SODIUM CHLORIDE 9 MG/ML
75 INJECTION, SOLUTION INTRAVENOUS CONTINUOUS
Status: DISCONTINUED | OUTPATIENT
Start: 2021-01-05 | End: 2021-01-06

## 2021-01-05 RX ADMIN — POTASSIUM CHLORIDE 20 MEQ: 1500 TABLET, EXTENDED RELEASE ORAL at 08:56

## 2021-01-05 RX ADMIN — INSULIN LISPRO 1 UNITS: 100 INJECTION, SOLUTION INTRAVENOUS; SUBCUTANEOUS at 21:11

## 2021-01-05 RX ADMIN — PRAVASTATIN SODIUM 20 MG: 20 TABLET ORAL at 17:22

## 2021-01-05 RX ADMIN — LOPERAMIDE HYDROCHLORIDE 2 MG: 2 CAPSULE ORAL at 20:44

## 2021-01-05 RX ADMIN — PIPERACILLIN AND TAZOBACTAM 3.38 G: 36; 4.5 INJECTION, POWDER, FOR SOLUTION INTRAVENOUS at 20:44

## 2021-01-05 RX ADMIN — METOPROLOL TARTRATE 50 MG: 50 TABLET, FILM COATED ORAL at 20:47

## 2021-01-05 RX ADMIN — SODIUM CHLORIDE 500 ML: 0.9 INJECTION, SOLUTION INTRAVENOUS at 12:31

## 2021-01-05 RX ADMIN — SODIUM CHLORIDE 75 ML/HR: 0.9 INJECTION, SOLUTION INTRAVENOUS at 15:49

## 2021-01-05 RX ADMIN — SODIUM CHLORIDE, SODIUM LACTATE, POTASSIUM CHLORIDE, AND CALCIUM CHLORIDE 75 ML/HR: .6; .31; .03; .02 INJECTION, SOLUTION INTRAVENOUS at 03:36

## 2021-01-05 RX ADMIN — PIPERACILLIN AND TAZOBACTAM 3.38 G: 36; 4.5 INJECTION, POWDER, FOR SOLUTION INTRAVENOUS at 15:49

## 2021-01-05 RX ADMIN — PIPERACILLIN AND TAZOBACTAM 3.38 G: 36; 4.5 INJECTION, POWDER, FOR SOLUTION INTRAVENOUS at 10:39

## 2021-01-05 RX ADMIN — ENOXAPARIN SODIUM 40 MG: 40 INJECTION SUBCUTANEOUS at 08:56

## 2021-01-05 RX ADMIN — METFORMIN HYDROCHLORIDE 500 MG: 500 TABLET ORAL at 08:56

## 2021-01-05 RX ADMIN — AMLODIPINE BESYLATE 5 MG: 5 TABLET ORAL at 08:56

## 2021-01-05 RX ADMIN — ASPIRIN 81 MG: 81 TABLET ORAL at 08:56

## 2021-01-05 RX ADMIN — TIOTROPIUM BROMIDE 18 MCG: 18 CAPSULE ORAL; RESPIRATORY (INHALATION) at 12:30

## 2021-01-05 RX ADMIN — METOPROLOL TARTRATE 50 MG: 50 TABLET, FILM COATED ORAL at 08:56

## 2021-01-05 NOTE — ASSESSMENT & PLAN NOTE
Lab Results   Component Value Date    HGBA1C 7 2 (H) 09/04/2020     Hold oral hypoglycemics  On SSI coverage per Accu-Cheks  Carbohydrate restricted diet

## 2021-01-05 NOTE — PROGRESS NOTES
[facility] - Consult Progress Note  Patient: Tootie Guy 79 y o  female MRN: [id number]  Unit/Bed#: [facility] 913-01 Encounter: [id number]  Primary Care Provider: Dewayne Will MD  Date Of Visit: 01/05/21        Assessment & Plan:    * Postoperative intra-abdominal abscess  Assessment & Plan  Recent laparoscopic left hemicolectomy with takedown of splenic flexure and coloproctostomy on 12/18/20 noted  CT imaging on presentation revealed a "9 5 x 6 2 x 9 2 cm lobulated collection in the pelvis anterior to the rectum demonstrating homogenous internal intermediate density which may represent a hematoma, superimposed infection is suspected given rim enhancement and surrounding inflammatory change  This collection is remote from the sigmoid anastomosis and there is no free air to suggest perforation  If there is clinical concern for fistula to the bowel CT examination with enteric and rectal contrast may be obtained for further evaluation  Patsi Reil Patsi Reil There is a 6 4 x 1 3 x 2 2 cm left intra-abdominal wall collection containing a fat fluid level likely due to seroma versus hematoma "  S/p IR guided drainage catheter placement on 1/4  Continue current IV Zosyn - pending aspirate culture/sensitivities  PRN pain control - supportive care otherwise  Further management per primary service (colorectal surgery)    Syncope  Assessment & Plan  Initial would event on the evening of 1/4 due to witnessed syncopal event shortly after standing up from using toilet - consider vasovagal etiology due to micurition  Orthostatics positive today - encouraged increased oral intake - trial IV fluids  Echocardiogram revealed a normal EF w/ severe mid-anteroseptal and mid-inferoseptal hypokinesis -> cardiology to evaluate    Adenocarcinoma of colon  Assessment & Plan  S/p left hemicolectomy on 12/18/20 w/ tissue pathology confirmation  Colorectal surgery managing    Hypothyroidism  Assessment & Plan  Free T4 normal - continue current Synthroid regimen    Hyperlipidemia  Assessment & Plan  Continue ASA/statin    Essential hypertension  Assessment & Plan  Continue Norvasc/Lopressor    Diabetes mellitus type 2  Assessment & Plan  Lab Results   Component Value Date    HGBA1C 7 2 (H) 2020     Hold oral hypoglycemics  On SSI coverage per Accu-Cheks  Carbohydrate restricted diet    COPD  Assessment & Plan  Outpatient follow-up  Chronically on 2 L via nasal cannula  Continue anticholinergic - PRN Albuterol on board    Hypophosphatemia  Assessment & Plan  Monitor/replete serum phosphate    Adnexal cyst  Assessment & Plan  Incidental 3 cm right adnexal cyst and prominent size of left ovary noted on admission CT -  as patient is postmenopausal, should follow up in 6-12 months by pelvic ultrasound per Radiology recommendation      DVT Prophylaxis:  Lovenox       Patient Centered Rounds:  I have performed bedside rounds and discussed plan of care with nursing today  Discussions with Specialists or Other Care Team Provider:  see above assessments if applicable    Education and Discussions with Family / Patient:  Patient at bedside - primary service to contact family    Time Spent for Care:  32 minutes  More than 50% of total time spent on counseling and coordination of care as described above  Current Length of Stay: 2 day(s)    Current Patient Status: Inpatient   Certification Statement:  Patient will continue to require additional hospital stay due to assessments as noted above  Code Status: Level 1 - Full Code        Subjective:     Seen/examined earlier today  Remains generally weak/fatigued  Still complains of intermittent dizziness while at rest, however, does acknowledge decreased oral intake          Objective:     Vitals:   Temp (24hrs), Av 2 °F (36 8 °C), Min:97 4 °F (36 3 °C), Max:98 5 °F (36 9 °C)    Temp:  [97 4 °F (36 3 °C)-98 5 °F (36 9 °C)] 98 5 °F (36 9 °C)  HR:  [78-86] 82  Resp:  [18-22] 22  BP: (116-166)/(62-83) 125/68  SpO2:  [94 %-100 %] 96 %  Body mass index is 27 86 kg/m²  Input and Output Summary (last 24 hours): Intake/Output Summary (Last 24 hours) at 1/5/2021 1534  Last data filed at 1/5/2021 1348  Gross per 24 hour   Intake 750 ml   Output 854 ml   Net -104 ml       Physical Exam:     GENERAL:  Weak/fatigued  HEAD:  Normocephalic - atraumatic  EYES: PERRL - EOMI   MOUTH:  Mucosa moist  NECK:  Supple - full range of motion  CARDIAC:  Rate controlled - S1/S2 positive  PULMONARY:  Fairly clear to auscultation - nonlabored respirations  ABDOMEN:  Soft - nontender/nondistended - active bowel sounds - right abdominal drainage catheter in place  MUSCULOSKELETAL:  Motor strength/range of motion deconditioned  NEUROLOGIC:  Alert/oriented at baseline  SKIN:  Chronic wrinkles/blemishes   PSYCHIATRIC:  Mood/affect stable      Additional Data:     Labs & Recent Cultures:    Results from last 7 days   Lab Units 01/05/21  0446   WBC Thousand/uL 11 59*   HEMOGLOBIN g/dL 10 4*   HEMATOCRIT % 32 7*   PLATELETS Thousands/uL 386   NEUTROS PCT % 77*   LYMPHS PCT % 12*   MONOS PCT % 9   EOS PCT % 1     Results from last 7 days   Lab Units 01/05/21  0446  01/02/21  1936   POTASSIUM mmol/L 3 7   < > 3 8   CHLORIDE mmol/L 105   < > 95*   CO2 mmol/L 23   < > 25   BUN mg/dL 6   < > 15   CREATININE mg/dL 0 43*   < > 1 03   CALCIUM mg/dL 8 8   < > 8 8   ALK PHOS U/L  --   --  87   ALT U/L  --   --  18   AST U/L  --   --  29    < > = values in this interval not displayed           Results from last 7 days   Lab Units 01/05/21  1110 01/05/21  0732 01/05/21  0009 01/04/21  1726 01/04/21  1217 01/04/21  0736 01/04/21  0549 01/03/21  2357 01/03/21  2059 01/03/21  1711 01/03/21  1210   POC GLUCOSE mg/dl 138 123 133 165* 121 123 103 94 76 156* 118         Results from last 7 days   Lab Units 01/04/21 2013 01/02/21  1936   LACTIC ACID mmol/L 1 2 1 9         Results from last 7 days   Lab Units 01/04/21  1404   GRAM STAIN RESULT  4+ Polys*  4+ Gram variable rods*   BODY FLUID CULTURE, STERILE  1+ Growth of Gram Negative Darvin Enteric Like*  Few Colonies of Proteus species*         Last 24 Hours Medication List:   Current Facility-Administered Medications   Medication Dose Route Frequency Provider Last Rate    acetaminophen  650 mg Oral Q6H PRN Caty Rowley PA-C      albuterol  2 puff Inhalation Q4H PRN Hugo Leavitt MD      amLODIPine  5 mg Oral Daily Carrie [facility], DO      aspirin  81 mg Oral Daily Oziel Carroll MD      enoxaparin  40 mg Subcutaneous Daily [facility], [facility]      insulin lispro  1-5 Units Subcutaneous TID AC Elder Cure, [place]      insulin lispro  1-5 Units Subcutaneous HS Elder Cure, [place]      metoprolol tartrate  50 mg Oral Q12H [address] [facility], DO      ondansetron  4 mg Intravenous Q4H PRN Oziel Carroll MD      oxyCODONE  2 5 mg Oral Q4H PRN Caty Rowley PA-C      piperacillin-tazobactam  3 375 g Intravenous Q6H Caty Rowley PA-C 3 375 g (01/05/21 1039)    pravastatin  20 mg Oral Daily With David American P [facility], DO      sodium chloride  75 mL/hr Intravenous Continuous Mateo Franco MD      tiotropium  18 mcg Inhalation Daily Mateo Franco MD                  ** Please Note: This note is constructed using a voice recognition dictation system  An occasional wrong word/phrase or sound-a-like substitution may have been picked up by dictation device due to the inherent limitations of voice recognition software  Read the chart carefully and recognize, using reasonable context, where substitutions may have occurred  **

## 2021-01-05 NOTE — ASSESSMENT & PLAN NOTE
· Patient follows with Dr Jesika Knutson of [facility]  · Gold Group D per outpatient Pulmonology record review   · Patient on 2L NC O2 PRN and QHS  · Home Stiolto non-formulary

## 2021-01-05 NOTE — CONSULTS
Consultation - Cardiology Team Charlie Guy 79 y o  female MRN: [id number]  Unit/Bed#: [facility] 913-01 Encounter: [id number]    Inpatient consult to Cardiology  Consult performed by: DAMARIS Santiago  Consult ordered by: Palomo Carreon PA-C      Physician Requesting Consult: Alethea Shea MD  Reason for Consult / Principal Problem: Change in echocardiogram    Assessment/ Plan    1  Post-operative intra-abdominal abscess  S/p IR drain placement yesterday, on IV antibiotics  Leukocytosis improving, afebrile  Abdominal pain improved  2  Syncope  Witnessed syncopal episode yesterday, occurred immediately after standing up from toilet after urinating  Likely orthostatic vs vasovagal  Patient denies prodromal symptoms or prior history of syncope  Per RN- patient c/o dizziness prior but pt does not recall this  ECG- NSR  Troponin negative  Continue to monitor on telemetry for arrhythmia given family history  3  Abnormal echocardiogram  Echocardiogram completed due to syncopal episode  LVEF 60% with hypokinesis of the mid anteroseptal and mid inferoseptal walls  Prior study in care everywhere does not mention WMA but images cannot be reviewed for comparison  It is possible that this hypokinesis is a result of prior stenting/blockages especially considering that one of her blockages was not stented  Due to c/o progressive dyspnea on exertion (which may be multifactorial), recommend that she establish with a cardiologist and have non-urgent stress test as an outpatient once recovered from hemicolectomy and abdominal abscess  She is agreeable with this  4  CAD s/p remote stenting  Reports stent to one of 2 areas that were found to be blocked about 25 years ago  She is unsure of exactly where her stent is placed  Has not followed with a cardiologist since and has also not had an ischemic evaluation since  Continue medical management of CAD- ASA, statin, and beta blocker      5  Colon cancer s/p left hemicolectomy 12/2020  Outpatient follow up as scheduled  6  Chronic hypoxic respiratory failure 2/2 COPD  Wears 2LNC with ambulation and certain activities at home  On baseline O2 requirement currently  7  HTN- stable on amlodipine 5 mg daily and metoprolol 50 mg BID  Avg /66  Home regimen also includes lisinopril 40 mg daily but that is currently held  8  HLD- continue statin therapy  9  Type 2 DM- A1c 7 2% in September 2020  Management per primary team     History of Present Illness   HPI: Josephine Singer is a 79y o  year old female with CAD s/p remote stenting (25+ years ago), HTN, HLD, type 2 DM, COPD, chronic hypoxic respiratory failure on home oxygen therapy, and recently diagnosed colon cancer s/p left hemicolectomy on 12/18/20  She presented to the ED on 1/3/21 with abdominal pain and CT imaging demonstrated a pelvic fluid collection concerning for post operative abscess  She underwent IR drain placement on 1/4/21 and is on IV antibiotics with improving leukocytosis  In the evening of 1/4 she had a witnessed syncopal episode while ambulating from the bathroom back to bed  Troponin was negative and she was placed on telemetry to monitor for arrhythmia  Orthostatic VS were also ordered but none are documented and she is already receiving IVF resuscitation  An echocardiogram was completed today and showed LVEF 60% with severe hypokinesis of the mid anteroseptal and mid inferoseptal walls with grade 1 DD  Mild MR and a thickened atrial septum was also noted  Her last echocardiogram was completed in September 2020 and revealed normal LV function with mild MR- there was no mention of RWMA and the images unfortunately cannot be reviewed  ECG at the time of her syncopal episode demonstrates NSR  Cardiology has been consulted regarding the wall motion changes seen on her echocardiogram report      Family history includes sister and father with CAD, one son with afib who suffered a cardiac arrest 10 years ago and ultimately passed away after being kept on life support for 5 days and another son with an arrhythmia that required what sounds like an ablation  She lives at home by herself and is independent with all ADLs  She typically does all of her own grocery shopping but, in light of the pandemic, a friend has been helping her get her groceries due to her underlying respiratory disease  She has baseline dyspnea with exertion for which she uses supplemental O2 however she notes this has been progressively getting worse over the past 1 5-2 years  She is still able to vacuum and doing household chores as well as take short walks with her elderly rescue dog  At the time of her previous coronary artery stenting she had been having chest pain and "2 blockages were found but they only put in one stent"  This was found on routine testing, she denies having had an MI  She is unsure where exactly her stent is located and her stent card is in her wallet at home  She has not seen a cardiologist since her stent was placed  EKG reviewed personally: NSR    Telemetry reviewed personally: NSR    Review of Systems   Constitution: Negative for chills, diaphoresis, malaise/fatigue and weight gain  Cardiovascular: Positive for dyspnea on exertion and syncope  Negative for chest pain, leg swelling, orthopnea and palpitations  Respiratory: Negative for cough, sleep disturbances due to breathing, sputum production and wheezing  Gastrointestinal: Positive for bloating and abdominal pain  Negative for nausea  Neurological: Negative for dizziness, light-headedness and weakness  All other systems reviewed and are negative      Historical Information   Past Medical History:   Diagnosis Date    Cancer ([facility] )     COPD (chronic obstructive pulmonary disease) ([facility] )     Diabetes mellitus ([facility] )     Hypertension     On home oxygen therapy     2L @ hs & with exertional activity      Past Surgical History:   Procedure Laterality Date    CATARACT EXTRACTION      CORONARY ANGIOPLASTY WITH STENT PLACEMENT      placed [id number]    CT GUIDED PERC DRAINAGE CATHETER PLACEMENT  1/4/2021    EXTRACORPOREAL SHOCK WAVE LITHOTRIPSY      HEMICOLOECTOMY W/ ANASTOMOSIS Left 12/18/2020    Procedure: LAPAROSCOPIC HEMICOLECTOMY, TAKE DOWN OF SPLENIC FLEXURE, COLORECTAL ANASTAMOSIS;  Surgeon: Christiano Garcia MD;  [facility];  Service: Colorectal    HYSTERECTOMY      KIDNEY SURGERY      'growth removed'     NERVE SURGERY      R arm     TONSILLECTOMY      TUBAL LIGATION       Social History     Substance and Sexual Activity   Alcohol Use Not Currently     Social History     Substance and Sexual Activity   Drug Use Never     Social History     Tobacco Use   Smoking Status Former Smoker   Smokeless Tobacco Never Used   Tobacco Comment    quit 2000      Family History:   Family History   Problem Relation Age of Onset    Cancer Mother     Diabetes Father     Hypertension Father     Heart attack Father     Diabetes Sister     Cancer Sister     Heart disease Brother     Stroke Maternal Grandmother      Meds/Allergies   all current active meds have been reviewed and current meds:   Current Facility-Administered Medications   Medication Dose Route Frequency    acetaminophen (TYLENOL) tablet 650 mg  650 mg Oral Q6H PRN    albuterol (PROVENTIL HFA,VENTOLIN HFA) inhaler 2 puff  2 puff Inhalation Q4H PRN    amLODIPine (NORVASC) tablet 5 mg  5 mg Oral Daily    aspirin (ECOTRIN LOW STRENGTH) EC tablet 81 mg  81 mg Oral Daily    enoxaparin (LOVENOX) subcutaneous injection 40 mg  40 mg Subcutaneous Daily    insulin lispro (HumaLOG) 100 units/mL subcutaneous injection 1-5 Units  1-5 Units Subcutaneous TID AC    insulin lispro (HumaLOG) 100 units/mL subcutaneous injection 1-5 Units  1-5 Units Subcutaneous HS    metoprolol tartrate (LOPRESSOR) tablet 50 mg  50 mg Oral Q12H LISA    ondansetron (ZOFRAN) injection 4 mg  4 mg Intravenous Q4H PRN    oxyCODONE (ROXICODONE) IR tablet 2 5 mg  2 5 mg Oral Q4H PRN    piperacillin-tazobactam (ZOSYN) 3 375 g in sodium chloride 0 9 % 100 mL IVPB  3 375 g Intravenous Q6H    pravastatin (PRAVACHOL) tablet 20 mg  20 mg Oral Daily With Dinner    sodium chloride 0 9 % bolus 500 mL  500 mL Intravenous Once    tiotropium (SPIRIVA) capsule for inhaler 18 mcg  18 mcg Inhalation Daily     Allergies   Allergen Reactions    Demerol [Meperidine] Other (See Comments)     'eyes enlarged'      Objective   Vitals: Blood pressure 128/70, pulse 86, temperature 98 3 °F (36 8 °C), resp  rate 20, height 5' (1 524 m), SpO2 94 %  , Body mass index is 27 86 kg/m²  ,     Systolic (45USL), XQE:336 , Min:101 , AFX:596     Diastolic (92WFK), VWU:34, Min:57, Max:83    Intake/Output Summary (Last 24 hours) at 1/5/2021 1336  Last data filed at 1/5/2021 1244  Gross per 24 hour   Intake 850 ml   Output 929 ml   Net -79 ml     Wt Readings from Last 3 Encounters:   01/02/21 64 7 kg (142 lb 10 2 oz)   12/18/20 58 1 kg (128 lb)     Invasive Devices     Peripheral Intravenous Line            Peripheral IV 01/02/21 Left;Ventral (anterior) Forearm 2 days          Drain            External Urinary Catheter 15 days    Closed/Suction Drain Right Back Bulb 10 2 Fr  less than 1 day              Physical Exam  Vitals signs reviewed  Constitutional:       General: She is not in acute distress  Neck:      Musculoskeletal: Neck supple  Vascular: No hepatojugular reflux or JVD  Cardiovascular:      Rate and Rhythm: Normal rate and regular rhythm  Pulses: Normal pulses  Heart sounds: Normal heart sounds  No murmur  No friction rub  No gallop  Pulmonary:      Effort: Pulmonary effort is normal  No respiratory distress  Comments: Decreased at bases, on [facility]  Abdominal:      General: Bowel sounds are decreased  There is no distension  Palpations: Abdomen is soft  Tenderness: There is abdominal tenderness  Comments: Trocar sites and LLQ incision JARRELL, healing well  YENI drain in place  Musculoskeletal: Normal range of motion  General: No tenderness  Right lower leg: No edema  Left lower leg: No edema  Skin:     General: Skin is warm and dry  Capillary Refill: Capillary refill takes less than 2 seconds  Findings: No erythema  Neurological:      Mental Status: She is alert and oriented to person, place, and time  Cranial Nerves: No cranial nerve deficit     Psychiatric:         Mood and Affect: Mood normal      LABORATORY RESULTS:  Results from last 7 days   Lab Units 01/04/21 2013 01/02/21 1936   TROPONIN I ng/mL <0 02 <0 02     CBC with diff:   Results from last 7 days   Lab Units 01/05/21 0446 01/04/21 2014 01/04/21 0552 01/02/21  1936   WBC Thousand/uL 11 59* 14 67* 10 08 13 29*   HEMOGLOBIN g/dL 10 4* 11 2* 11 7 12 1   HEMATOCRIT % 32 7* 35 3 36 9 38 3   MCV fL 85 84 84 84   PLATELETS Thousands/uL 386 392* 428* 473*   MCH pg 26 9 26 5* 26 5* 26 4*   MCHC g/dL 31 8 31 7 31 7 31 6   RDW % 14 5 14 3 14 4 14 4   MPV fL 9 9 9 3 9 4 9 6   NRBC AUTO /100 WBCs 0 0 0 0     CMP:  Results from last 7 days   Lab Units 01/05/21 0446 01/04/21 0552 01/02/21  1936   POTASSIUM mmol/L 3 7 4 1 3 8   CHLORIDE mmol/L 105 103 95*   CO2 mmol/L 23 25 25   BUN mg/dL 6 7 15   CREATININE mg/dL 0 43* 0 50* 1 03   CALCIUM mg/dL 8 8 8 9 8 8   AST U/L  --   --  29   ALT U/L  --   --  18   ALK PHOS U/L  --   --  87   EGFR ml/min/1 73sq m 97 92 52     BMP:  Results from last 7 days   Lab Units 01/05/21 0446 01/04/21 0552 01/02/21  1936   POTASSIUM mmol/L 3 7 4 1 3 8   CHLORIDE mmol/L 105 103 95*   CO2 mmol/L 23 25 25   BUN mg/dL 6 7 15   CREATININE mg/dL 0 43* 0 50* 1 03   CALCIUM mg/dL 8 8 8 9 8 8     Lab Results   Component Value Date    CREATININE 0 43 (L) 01/05/2021    CREATININE 0 50 (L) 01/04/2021    CREATININE 1 03 01/02/2021     Lab Results   Component Value Date    NTBNP 1,857 (H) 01/02/2021 Results from last 7 days   Lab Units 21  0552   MAGNESIUM mg/dL 2 0 1 6      Results from last 7 days   Lab Units 21   TSH 3RD GENERATON uIU/mL 11 000*   FREE T4 ng/dL 1 28     Cardiac testing:   Results for orders placed during the hospital encounter of 21   Echo complete with contrast if indicated    Narrative [address]  [facility] [place], [place], [address] [facility]  [phone]    Transthoracic Echocardiogram  2D, M-mode, Doppler, and Color Doppler    Study date:  2021    Patient: Ayaan Benz  MR number: [id number]  Account number: [de-identified]  : 1940  Age: 79 years  Gender: Female  Status: Inpatient  Location: Bedside  Height: 60 in  Weight: 142 lb  BP: 130/ 64 mmHg    Indications: Syncope and collapse    Diagnoses: R55  - Syncope and collapse    Sonographer:  VANNESSA Pacheco [facility]  Referring Physician:  Rasta Hunt MD  Group:  [id number]  Interpreting Physician:  Yaya Farias MD    SUMMARY    LEFT VENTRICLE:  Systolic function was normal  Ejection fraction was estimated to be 60 %  There was severe hypokinesis of the mid anteroseptal and mid inferoseptal wall(s)  Doppler parameters were consistent with abnormal left ventricular relaxation (grade 1 diastolic dysfunction)  ATRIAL SEPTUM:  The septum was thickened  MITRAL VALVE:  There was mild regurgitation  HISTORY: PRIOR HISTORY: Syncope; CAD; Hypertension; Hyperlipidemia; DM2; COPD; Adenocarcinoma of colon; Former smoker    PROCEDURE: The procedure was performed at the bedside  This was a routine study  The transthoracic approach was used  The study included complete 2D imaging, M-mode, complete spectral Doppler, and color Doppler  The heart rate was 84 bpm,  at the start of the study  Images were obtained from the parasternal, apical, subcostal, and suprasternal notch acoustic windows  Image quality was adequate, overall  Parasternal views technically difficult due to poor acoustic window  availability  LEFT VENTRICLE: Size was normal  Systolic function was normal  Ejection fraction was estimated to be 60 %  There was severe hypokinesis of the mid anteroseptal and mid inferoseptal wall(s)  Wall thickness was normal  DOPPLER: Doppler  parameters were consistent with abnormal left ventricular relaxation (grade 1 diastolic dysfunction)  RIGHT VENTRICLE: The size was normal  Systolic function was normal  Wall thickness was normal     LEFT ATRIUM: Size was normal     ATRIAL SEPTUM: The septum was thickened  RIGHT ATRIUM: Size was normal     MITRAL VALVE: Valve structure was normal  There was normal leaflet separation  DOPPLER: The transmitral velocity was within the normal range  There was no evidence for stenosis  There was mild regurgitation  AORTIC VALVE: The valve was trileaflet  Leaflets exhibited normal thickness and normal cuspal separation  DOPPLER: Transaortic velocity was within the normal range  There was no evidence for stenosis  There was no significant  regurgitation  TRICUSPID VALVE: The valve structure was normal  There was normal leaflet separation  DOPPLER: The transtricuspid velocity was within the normal range  There was no evidence for stenosis  There was no significant regurgitation  PULMONIC VALVE: Leaflets exhibited normal thickness, no calcification, and normal cuspal separation  DOPPLER: The transpulmonic velocity was within the normal range  There was no significant regurgitation  PERICARDIUM: There was no pericardial effusion  The pericardium was normal in appearance  AORTA: The root exhibited normal size  SYSTEMIC VEINS: IVC: The inferior vena cava was normal in size      SYSTEM MEASUREMENT TABLES    2D  %FS: 25 38 %  Ao Diam: 3 18 cm  EDV(Teich): 43 13 ml  EF(Teich): 51 32 %  ESV(Teich): 20 99 ml  IVSd: 1 03 cm  LA Area: 13 07 cm2  LA Diam: 3 16 cm  LVEDV MOD A4C: 56 08 ml  LVEF MOD A4C: 66 6 %  LVESV MOD A4C: 18 73 ml  LVIDd: 3 27 cm  LVIDs: 2 44 cm  LVLd A4C: 6 64 cm  LVLs A4C: 5 21 cm  LVPWd: 0 89 cm  RA Area: 9 07 cm2  RVIDd: 2 71 cm  SV MOD A4C: 37 35 ml  SV(Teich): 22 13 ml    MM  TAPSE: 2 05 cm    PW  E' Sept: 0 09 m/s  E/E' Sept: 7 45  MV A Leandro: 1 02 m/s  MV Dec [place]: 2 98 m/s2  MV DecT: 225 4 ms  MV E Leandro: 0 67 m/s  MV E/A Ratio: 0 66  MV PHT: 65 37 ms  MVA By PHT: 3 37 cm2    [address] Accredited Echocardiography Laboratory    Prepared and electronically signed by    Army Kourtney MD  Signed 05-Jan-2021 09:56:11       Imaging: I have personally reviewed pertinent reports  and I have personally reviewed pertinent films in PACS  Ct Guided Perc Drainage Catheter Placeme    Result Date: 1/4/2021  Narrative: CT scan guided abscess drainage History: Postoperative fluid collection  Radiation dose: 516 7 mGy-cm Concious sedation time: 45 mins Technique: This examination, like all CT scans performed in the [facility], was performed utilizing techniques to minimize radiation dose exposure, including the use of iterative reconstruction and automated exposure control  The patient was brought to the CT scanner and placed prone on the table  After axial images were obtained through the pelvis, an area of the skin was then marked, prepped, and draped in usual sterile fashion  Lidocaine was administered to the skin and a small skin incision was made  A 19-gauge needle was advanced into the collection under CT-scan guidance  Dark brown pus was aspirated  A heavy wire was coiled within the lesion, and after tract dilatation, a 10 [employer] all-purpose drainage catheter was advanced and the loop formed within the collection  A total of 200 ml of dark brown pus was aspirated  The catheter was sutured in place, sterilely dressed, and connected to a YEIN bulb  A specimen was sent to the laboratory for culture and sensitivity   The patient tolerated the procedure well and suffered no complications  Impression: Impression: Successful placement of a therapeutic 10 [employer] all-purpose drainage catheter into the pelvic fluid collection collection  A total of 200 cc of dark brown pus was aspirated and a specimen sent to the laboratory as described  Workstation performed: [id number]     Pe Study With Ct Abdomen And Pelvis With Contrast    Result Date: 1/2/2021  Narrative: CT PULMONARY ANGIOGRAM OF THE CHEST AND CT ABDOMEN AND PELVIS WITH INTRAVENOUS CONTRAST INDICATION:   Dyspnea with exertion  Abdominal pain  COMPARISON:  None  TECHNIQUE:  CT examination of the chest, abdomen and pelvis was performed  Thin section CT angiographic technique was used in the chest in order to evaluate for pulmonary embolus and coronal 3D MIP postprocessing was performed on the acquisition scanner  Axial, sagittal, and coronal 2D reformatted images were created from the source data and submitted for interpretation  Radiation dose length product (DLP) for this visit:  169 mGy-cm   This examination, like all CT scans performed in the [facility], was performed utilizing techniques to minimize radiation dose exposure, including the use of iterative reconstruction and automated exposure control  IV Contrast:  100 mL of iohexol (OMNIPAQUE)  was administered intravenously without immediate adverse reaction  Enteric Contrast:  Enteric contrast was not administered  FINDINGS: CHEST PULMONARY ARTERIAL TREE:  No pulmonary embolus is seen  LUNGS:  Severe emphysematous changes of lungs  No focal consolidation  The central airways are patent  PLEURA:  Unremarkable  HEART/AORTA:  The heart is normal in size  Coronary artery calcifications  No pericardial effusion  MEDIASTINUM AND JOHN:  Unremarkable  CHEST WALL AND LOWER NECK: Atrophic thyroid  4 mm nodule in the right thyroid    Incidental discovery of one or more thyroid nodule(s) measuring less than 1 5 cm and without suspicious features is noted in this patient who is above 28years old; according to guidelines published in the February 2015 white paper on incidental thyroid nodules in the Journal of the [employer] of Radiology Applee Nissen), no further evaluation is recommended  ABDOMEN LIVER/BILIARY TREE:  Mildly nodular contour of the liver  GALLBLADDER:  No calcified gallstones  No pericholecystic inflammatory change  SPLEEN:  Unremarkable  PANCREAS:  Unremarkable  ADRENAL GLANDS:  Unremarkable  KIDNEYS/URETERS:  Large calculi in the left renal collecting system measuring up to 1 1 cm in the renal pelvis  A right renal calculus measures 3 mm  There is mild bilateral hydronephrosis and hydroureter  STOMACH AND BOWEL:  No bowel obstruction  Proximal sigmoid anastomosis is seen which appears unremarkable  Mild thickening of the rectosigmoid colon  APPENDIX:  No findings to suggest appendicitis  ABDOMINOPELVIC CAVITY:  There is a 9 5 x 6 2 x 9 2 cm lobulated collection within the pelvis anterior to the rectum demonstrating homogenous internal intermediate density and thin rim enhancement (series 3, image 59)  VESSELS:  Severe atherosclerotic calcifications  Common origin of the celiac artery and superior mesenteric artery  Probable moderate narrowing at the origin of the right renal artery  Moderate narrowing of bilateral common iliac arteries  PELVIS REPRODUCTIVE ORGANS:  Surgical changes of prior hysterectomy  There is a 3 cm cyst in the right adnexa which may represent an ovarian cyst   There is a multicystic structure in the left adnexa measuring up to 3 8 cm which may represent the left ovary  URINARY BLADDER:  Mild thickening of the urinary bladder wall  Trace perivesicular stranding  ABDOMINAL WALL/INGUINAL REGIONS:  There is a 6 4 x 1 3 x 2 2 cm left anterior abdominal wall collection containing a fat fluid level (series 3, image 46)  OSSEOUS STRUCTURES:  No acute fracture or destructive osseous lesion    Grade 1 anterolisthesis of L4 on L5  Impression: 1   9 5 x 6 2 x 9 2 cm lobulated collection in the pelvis anterior to the rectum demonstrating homogenous internal intermediate density which may represent a hematoma, superimposed infection is suspected given rim enhancement and surrounding inflammatory  change  This collection is remote from the sigmoid anastomosis and there is no free air to suggest perforation  If there is clinical concern for fistula to the bowel CT examination with enteric and rectal contrast may be obtained for further evaluation  2   There is a 6 4 x 1 3 x 2 2 cm left intra-abdominal wall collection containing a fat fluid level likely due to seroma versus hematoma  3   Mild thickening of the rectosigmoid colon which may be reactive  4   Thickening of the urinary bladder wall and trace perivesicular stranding may be reactive however with urinalysis for cystitis  5   Incidental 3 cm right adnexal cyst and prominent size of left ovary  In this postmenopausal woman, this should be followed up in 6 to 12 months by pelvic ultrasound  6   Bilateral nonobstructive nephrolithiasis  There is mild bilateral hydroureteronephrosis likely due to ureteral involvement from pelvic collection/inflammation  Metastatic deposits involving the distal ureters is not excluded however less likely  7   Other findings as above  I personally discussed this study with Jennifer Shown on 1/2/2021 at 10:42 PM  Workstation performed: [id number]     Thank you for allowing us to participate in this patient's care  Counseling / Coordination of Care  Total floor / unit time spent today 45 minutes  Greater than 50% of total time was spent with the patient and / or family counseling and / or coordination of care  A description of the counseling / coordination of care: Review of history, current assessment, development of a plan      Code Status: Level 1 - Full Code    ** Please Note: Dragon 360 Dictation voice to text software may have been used in the creation of this document   **

## 2021-01-05 NOTE — ASSESSMENT & PLAN NOTE
Recent laparoscopic left hemicolectomy with takedown of splenic flexure and coloproctostomy on 12/18/20 noted  CT imaging on presentation revealed a "9 5 x 6 2 x 9 2 cm lobulated collection in the pelvis anterior to the rectum demonstrating homogenous internal intermediate density which may represent a hematoma, superimposed infection is suspected given rim enhancement and surrounding inflammatory change  This collection is remote from the sigmoid anastomosis and there is no free air to suggest perforation  If there is clinical concern for fistula to the bowel CT examination with enteric and rectal contrast may be obtained for further evaluation  Jay Hooper There is a 6 4 x 1 3 x 2 2 cm left intra-abdominal wall collection containing a fat fluid level likely due to seroma versus hematoma "  S/p IR guided drainage catheter placement on 1/4  Continue current IV Zosyn - pending aspirate culture/sensitivities  PRN pain control - supportive care otherwise  Further management per primary service (colorectal surgery)

## 2021-01-05 NOTE — ASSESSMENT & PLAN NOTE
Outpatient follow-up  Chronically on 2 L via nasal cannula  Continue anticholinergic - PRN Albuterol on board

## 2021-01-05 NOTE — ASSESSMENT & PLAN NOTE
· Patient was a Rapid Response on the evening of 1/4/21 due to witnessed syncopal event   · Per patient and nursing report, patient was ambulating assisted back to her bed from the bathroom when she synopsized   · Patient reports significant arrhythmia and valvular heart history in her sons  Agree with telemetry monitoring and echocardiogram  · 2D echo from Sept 2020 in [facility] noted, per the report, to be a technically difficult study  Left ventricle was noted to be small with mild hypertrophy with an ejection fraction of 55-60%  Mild mitral valve regurgitation with multiple jets noted  Pericardium has a fat pad  Unable to comment on pulmonary artery pressure  · Carotid Doppler from Sept 2020 in [facility] noted, per the results report, to have no sonographic evidence of hemodynamically significant carotid artery stenosis  · Consider orthostatic event   Check orthostatic vital signs  · Consider vasovagal etiology   · Labs pending   · No pulmonary embolus seen on admission CTA per the results report

## 2021-01-05 NOTE — ASSESSMENT & PLAN NOTE
· Incidental 3 centimetre right adnexal cyst and prominent size of left ovary noted on admission CT    In this postmenopausal woman, this should be followed up in 6-12 months by pelvic ultrasound per Radiology

## 2021-01-05 NOTE — ASSESSMENT & PLAN NOTE
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

## 2021-01-05 NOTE — ASSESSMENT & PLAN NOTE
Initial would event on the evening of 1/4 due to witnessed syncopal event shortly after standing up from using toilet - consider vasovagal etiology due to micurition  Orthostatics positive today - encouraged increased oral intake - trial IV fluids  Echocardiogram revealed a normal EF w/ severe mid-anteroseptal and mid-inferoseptal hypokinesis -> cardiology to evaluate

## 2021-01-05 NOTE — ASSESSMENT & PLAN NOTE
Incidental 3 cm right adnexal cyst and prominent size of left ovary noted on admission CT -  as patient is postmenopausal, should follow up in 6-12 months by pelvic ultrasound per Radiology recommendation

## 2021-01-05 NOTE — CONSULTS
Consult- Akin Lyons 1940, 79 y o  female MRN: [id number]    Unit/Bed#: [facility] 913-01 Encounter: [id number]    Primary Care Provider: Alberto Wheat MD   Date and time admitted to hospital: 1/3/2021  9:12 AM      Inpatient consult to Internal Medicine  Consult performed by: Hong Conley PA-C  Consult ordered by: Tricia Earl MD        * Abscess, intra-abdominal, postoperative  Assessment & Plan  · Patient recently (12/18/2020) underwent laparoscopic left hemicolectomy with takedown of splenic flexure and coloproctostomy by Dr Aggie Brown of Colorectal Surgery  · Patient presented back to the hospital on 1/3/21 complaining of abdominal pain and lethargy  · CT PE study with CT abdomen and pelvis with contrast on admission revealed, per the results report, a 9 5 x 6 2 by 9 2 cm lobulated collection in the pelvis anterior to the rectum demonstrating homogeneous internal intermediate density which may represent a hematoma, superimposed infection is suspected given rim enhancement and surrounding inflammatory change  This collection is remote from the sigmoid anastomosis and there is no free air to suggest perforation  · Patient underwent CT-guided perc drainage catheter placement by IR on 1/4/21  · Cultures pending  · Plan as per Colorectal Surgery and IR    Syncope  Assessment & Plan  · Patient was a Rapid Response on the evening of 1/4/21 due to witnessed syncopal event   · Per patient and nursing report, patient was ambulating assisted back to her bed from the bathroom when she synopsized   · Patient reports significant arrhythmia and valvular heart history in her sons  Agree with telemetry monitoring and echocardiogram  · 2D echo from Sept 2020 in [facility] noted, per the report, to be a technically difficult study  Left ventricle was noted to be small with mild hypertrophy with an ejection fraction of 55-60%  Mild mitral valve regurgitation with multiple jets noted    Pericardium has a fat pad  Unable to comment on pulmonary artery pressure  · Carotid Doppler from Sept 2020 in [address] noted, per the results report, to have no sonographic evidence of hemodynamically significant carotid artery stenosis  · Consider orthostatic event  Check orthostatic vital signs  · Consider vasovagal etiology   · Labs pending   · No pulmonary embolus seen on admission CTA per the results report    Adenocarcinoma of colon 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)  Assessment & Plan  Lab Results   Component Value Date    HGBA1C 7 2 (H) 09/04/2020       Recent Labs     01/04/21  0549 01/04/21  0736 01/04/21  1217 01/04/21  1726   POCGLU 103 123 121 165*       Blood Sugar Average: Last 72 hrs:  (P) 119 5     · Home oral antihyperglycemic on hold while hospitalized   · Patient has PO diet ordered per primary surgical service  Will change SSI coverage from Q6hr scheduled to TID AC and QHS  Start with low dose as patient only ordered clears at this time  Avoid hypoglycemia  Hypoglycemia protocol on board   Adjust regimen as indicated     HTN (hypertension)  Assessment & Plan  · Continue home amlodipine and metoprolol  · Hold ACE-I perioperatively     Hyperlipidemia  Assessment & Plan  · Continue statin    Hypothyroidism  Assessment & Plan  · Check TSH and clarify home levothyroxine dose     Adnexal cyst  Assessment & Plan  · Incidental 3 centimetre right adnexal cyst and prominent size of left ovary noted on admission CT  In this postmenopausal woman, this should be followed up in 6-12 months by pelvic ultrasound per Radiology      Nephrolithiasis  Assessment & Plan  · Known history of kidney stones per PCP record review       VTE Prophylaxis: Enoxaparin (Lovenox) per primary surgical service / sequential compression device     Recommendations for Discharge:  · SLIM will continue to follow   · Recommend primary service complete incidental finding and patient will need outpatient follow up with pelvis US in 6-12 months as recommended by radiology as above    Counseling / Coordination of Care Time: 1 hour  Greater than 50% of total time spent on patient counseling and coordination of care  Collaboration of Care: Were Recommendations Directly Discussed with Primary Treatment Team? - Yes discussed with Dr Lynn Yuen     History of Present Illness:    Ivet Saldaña is a 79 y o  female with adenocarcinoma of the colon s/p laparoscopic left hemicolectomy with takedown of splenic flexure and coloproctostomy on 10/18/2020, COPD gold group D on 2 L nasal cannula oxygen p r n  And q h s , hypertension, hyperlipidemia, and hypothyroidism, type 2 diabetes who is originally admitted to the Colorectal Surgery service due to postoperative abscess  We are consulted for syncope  History is obtained per rapid response record review as well as discussion with the patient's primary nurse and the patient herself  This evening the patient got up and went to the bathroom  She urinated, denies having a bowel movement, nurse confirms  She states that she stood up and that was it"  Her nurse reports that an aide was with her and called for help  Nurse reports that the patient was gray and slumped over unresponsive and took 4 people to assist her from falling and get her back in bed  They report loss of consciousness    The patient initially denies any recollection but then admits that [facility] remember people yelling" but could not see anything  She denies any prior similar events  She reports that her butt cheek hurts from the drain  She does not remember if she was having any significant pain at the time of the syncope  She reports feeling dizzy and lightheaded right before she passed out  She reports that this happened just after she got off the toilet  She reports chills, shaking, and abdominal pain which she admits is unchanged since her recent surgery and worse only when someone presses on it  She denies headache, vision changes, neck pain, chest pain, palpitations, shortness of breath, nausea, vomiting, bleeding, weakness, numbness, tingling, speech changes, or seizure  She denies any seizure history  She reports that her dad  from something with his hard"  She reports that her brother had a triple bypass and AV valve replacement  She reports that her sister had an AV valve replacement  She reports that her eldest son had AFib and had a sudden cardiac death  She reports that her middle son has an aortic aneurysm and pacemaker/defibrillator  She reports that her youngest son also had a cardiac arrest but was resuscitated and has a mitral valve replacement as well as pacemaker/defibrillator    At the time of my exam the patient is without complaint  She reports feeling back to her normal self  Review of Systems:    Review of Systems   Constitutional: Positive for chills  HENT: Negative for rhinorrhea, sinus pressure, sinus pain, sneezing and sore throat  Respiratory: Negative for cough and shortness of breath  Cardiovascular: Negative for chest pain and palpitations  Gastrointestinal: Positive for abdominal pain  Negative for blood in stool, constipation, diarrhea, nausea and vomiting  Genitourinary: Negative for difficulty urinating  Musculoskeletal: Negative for back pain and neck pain  Skin: Negative for pallor     Neurological: Positive for dizziness, syncope and light-headedness  Negative for seizures, speech difficulty, weakness, numbness and headaches  Psychiatric/Behavioral: The patient is not nervous/anxious  Past Medical and Surgical History:     Past Medical History:   Diagnosis Date    [facility] )     [facility]     Diabetes mellitus ([facility] )     Hypertension     On home oxygen therapy     2L @ hs & with exertional activity        Past Surgical History:   Procedure Laterality Date    CATARACT EXTRACTION      CORONARY ANGIOPLASTY WITH STENT PLACEMENT      placed [id number]    CT GUIDED PERC DRAINAGE CATHETER PLACEMENT  1/4/2021    EXTRACORPOREAL SHOCK WAVE LITHOTRIPSY      HEMICOLOECTOMY W/ ANASTOMOSIS Left 12/18/2020    Procedure: LAPAROSCOPIC HEMICOLECTOMY, TAKE DOWN OF SPLENIC FLEXURE, COLORECTAL ANASTAMOSIS;  Surgeon: Coretta Collado MD;  Location: [facility];  Service: Colorectal    HYSTERECTOMY      KIDNEY SURGERY      'growth removed'     NERVE SURGERY      R arm     TONSILLECTOMY      TUBAL LIGATION         Meds/Allergies:    PTA meds:   Prior to Admission Medications   Prescriptions Last Dose Informant Patient Reported? Taking?    Calcium Carb-Cholecalciferol (CALCIUM 600/VITAMIN D3 PO) Past Week at Unknown time  Yes Yes   Sig: Take by mouth   amLODIPine (NORVASC) 5 mg tablet Past Week at Unknown time  Yes Yes   Sig: Take 5 mg by mouth daily   aspirin 81 mg chewable tablet Past Week at Unknown time  Yes Yes   Sig: Chew 81 mg daily   co-enzyme Q-10 30 MG capsule Past Week at Unknown time  Yes Yes   Sig: Take 30 mg by mouth 3 (three) times a day   glimepiride (AMARYL) 1 mg tablet Past Week at Unknown time Self Yes Yes   Sig: Take 1 mg by mouth daily before breakfast   glimepiride (AMARYL) 1 mg tablet Past Week at Unknown time  Yes Yes   Sig: Take 1 mg by mouth daily before dinner   levothyroxine 100 mcg tablet Past Week at Unknown time Self Yes Yes   Sig: Take 112 mcg by mouth daily lisinopril (ZESTRIL) 40 mg tablet Past Week at Unknown time  Yes Yes   Sig: Take 40 mg by mouth every evening   metFORMIN (GLUCOPHAGE) 500 mg tablet Past Week at Unknown time  Yes Yes   Sig: Take 500 mg by mouth 2 (two) times a day with meals   metoprolol tartrate (LOPRESSOR) 50 mg tablet Past Week at Unknown time  Yes Yes   Sig: Take 50 mg by mouth every 12 (twelve) hours Takes 1 tab in am, 1/2 tab in pm   simvastatin (ZOCOR) 10 mg tablet Past Week at Unknown time  Yes Yes   Sig: Take 10 mg by mouth daily at bedtime   tiotropium-olodaterol (Stiolto Respimat) 2 5-2 5 MCG/ACT inhaler Past Week at Unknown time  Yes Yes   Sig: Inhale 2 puffs daily      Facility-Administered Medications: None       Allergies: Allergies   Allergen Reactions    Demerol [Meperidine] Other (See Comments)     'eyes enlarged'        Social History:     Marital Status:     Substance Use History:   Social History     Substance and Sexual Activity   Alcohol Use Not Currently     Social History     Tobacco Use   Smoking Status Former Smoker   Smokeless Tobacco Never Used   Tobacco Comment    quit 2000      Social History     Substance and Sexual Activity   Drug Use Never       Family History:    Family History   Problem Relation Age of Onset    Cancer Mother     Diabetes Father     Hypertension Father     Heart attack Father     Diabetes Sister     Cancer Sister     Heart disease Brother     Stroke Maternal Grandmother        Physical Exam:     Vitals:   Blood Pressure: 150/75 (01/04/21 1732)  Pulse: 85 (01/04/21 1732)  Temperature: 98 3 °F (36 8 °C) (01/04/21 1544)  Respirations: 18 (01/04/21 1544)  Height: 5' (152 4 cm)(per prior encounter) (01/04/21 1534)  SpO2: 100 % (01/04/21 1732)    Physical Exam  Vitals signs and nursing note reviewed  Constitutional:       Appearance: She is not toxic-appearing or diaphoretic  Comments: Patient seen lying in bed comfortably resting    She woke easily to knock on the door and her name being called  She was very pleasant and cooperative  No acute distress  Cardiovascular:      Rate and Rhythm: Normal rate and regular rhythm  Pulmonary:      Effort: Pulmonary effort is normal       Breath sounds: Normal breath sounds  Comments: On 2 L nasal cannula oxygen  Abdominal:      General: There is no distension  Palpations: Abdomen is soft  Comments: Recent Laparoscopic incisions noted   Genitourinary:     Comments: YENI drain with <25mL fluid  Musculoskeletal:      Right lower leg: No edema  Left lower leg: No edema  Skin:     General: Skin is warm  Neurological:      Mental Status: She is alert  Comments: Her speech is clear  She is alert and oriented to person, place, and time  EOMs intact bilaterally  Eyebrows raise equally bilaterally  Corners of mouth raise equally bilaterally with smiling  Equal facial strength with puffing of cheeks bilaterally  Tongue is  Shoulder shrug,  strength, elbow flexion and extension, hip flexion, foot dorsiflexion equal bilaterally  Patient reports equal and intact sensation to light touch on bilateral upper and lower extremities   Psychiatric:         Mood and Affect: Mood normal          Behavior: Behavior normal       Comments: Non anxious appearing           Additional Data:     Lab Results: I have personally reviewed pertinent reports        Results from last 7 days   Lab Units 01/04/21  0552   WBC Thousand/uL 10 08   HEMOGLOBIN g/dL 11 7   HEMATOCRIT % 36 9   PLATELETS Thousands/uL 428*   NEUTROS PCT % 69   LYMPHS PCT % 17   MONOS PCT % 11   EOS PCT % 1     Results from last 7 days   Lab Units 01/04/21  0552 01/02/21  1936   SODIUM mmol/L 137 131*   POTASSIUM mmol/L 4 1 3 8   CHLORIDE mmol/L 103 95*   CO2 mmol/L 25 25   BUN mg/dL 7 15   CREATININE mg/dL 0 50* 1 03   ANION GAP mmol/L 9 11   CALCIUM mg/dL 8 9 8 8   ALBUMIN g/dL  --  2 2*   TOTAL BILIRUBIN mg/dL  --  0 73   ALK PHOS U/L  --  87   ALT U/L  --  18   AST U/L  --  29   GLUCOSE RANDOM mg/dL 100 156*         Results from last 7 days   Lab Units 01/02/21  1936   TROPONIN I ng/mL <0 02     Lab Results   Component Value Date/Time    HGBA1C 7 2 (H) 09/04/2020 07:01 AM    HGBA1C 7 8 (H) 05/28/2019 07:08 AM    HGBA1C 7 6 (H) 10/30/2018 07:06 AM     Results from last 7 days   Lab Units 01/04/21  1726 01/04/21  1217 01/04/21  0736 01/04/21  0549 01/03/21  2357 01/03/21  2059 01/03/21  1711 01/03/21  1210   POC GLUCOSE mg/dl 165* 121 123 103 94 76 156* 118     Results from last 7 days   Lab Units 01/02/21  1936   LACTIC ACID mmol/L 1 9       Imaging: I have personally reviewed pertinent reports  CT guided perc drainage catheter placeme   Final Result by María Dove MD (01/04 1906)   Impression: Successful placement of a therapeutic 10 [place] all-purpose drainage catheter into the pelvic fluid collection collection  A total of 200 cc of dark brown pus was aspirated and a specimen sent to the laboratory as described  Workstation performed: [id number]         IR drainage tube check/change/reposition/reinsertion/upsize    (Results Pending)       EKG, Pathology, and Other Studies Reviewed on Admission:   · EKG: TWI in V1  · 1/2/21 CTA chest CT abdomen and pelvis with contrast   · 12/18/2020 tissue exam    ** Please Note: This note has been constructed using a voice recognition system   **

## 2021-01-05 NOTE — PLAN OF CARE
Problem: Potential for Falls  Goal: Patient will remain free of falls  Description: INTERVENTIONS:  - Assess patient frequently for physical needs  -  Identify cognitive and physical deficits and behaviors that affect risk of falls  -  [place] fall precautions as indicated by assessment   - Educate patient/family on patient safety including physical limitations  - Instruct patient to call for assistance with activity based on assessment  - Modify environment to reduce risk of injury  - Consider OT/PT consult to assist with strengthening/mobility  Outcome: Progressing     Problem: Prexisting or High Potential for Compromised Skin Integrity  Goal: Skin integrity is maintained or improved  Description: INTERVENTIONS:  - Identify patients at risk for skin breakdown  - Assess and monitor skin integrity  - Assess and monitor nutrition and hydration status  - Monitor labs   - Assess for incontinence   - Turn and reposition patient  - Assist with mobility/ambulation  - Relieve pressure over bony prominences  - Avoid friction and shearing  - Provide appropriate hygiene as needed including keeping skin clean and dry  - Evaluate need for skin moisturizer/barrier cream  - Collaborate with interdisciplinary team   - Patient/family teaching  - Consider wound care consult   Outcome: Progressing     Problem: Nutrition/Hydration-ADULT  Goal: Nutrient/Hydration intake appropriate for improving, restoring or maintaining nutritional needs  Description: Monitor and assess patient's nutrition/hydration status for malnutrition  Collaborate with interdisciplinary team and initiate plan and interventions as ordered  Monitor patient's weight and dietary intake as ordered or per policy  Utilize nutrition screening tool and intervene as necessary  Determine patient's food preferences and provide high-protein, high-caloric foods as appropriate       INTERVENTIONS:  - Monitor oral intake, urinary output, labs, and treatment plans  - Assess nutrition and hydration status and recommend course of action  - Evaluate amount of meals eaten  - Assist patient with eating if necessary   - Allow adequate time for meals  - Recommend/ encourage appropriate diets, oral nutritional supplements, and vitamin/mineral supplements  - Order, calculate, and assess calorie counts as needed  - Recommend, monitor, and adjust tube feedings and TPN/PPN based on assessed needs  - Assess need for intravenous fluids  - Provide specific nutrition/hydration education as appropriate  - Include patient/family/caregiver in decisions related to nutrition  Outcome: Progressing

## 2021-01-05 NOTE — ASSESSMENT & PLAN NOTE
Lab Results   Component Value Date    HGBA1C 7 2 (H) 09/04/2020       Recent Labs     01/04/21  0549 01/04/21  0736 01/04/21  1217 01/04/21  1726   POCGLU 103 123 121 165*       Blood Sugar Average: Last 72 hrs:  (P) 119 5     · Home oral antihyperglycemic on hold while hospitalized   · Patient has PO diet ordered per primary surgical service  Will change SSI coverage from Q6hr scheduled to TID AC and QHS  Start with low dose as patient only ordered clears at this time  Avoid hypoglycemia  Hypoglycemia protocol on board   Adjust regimen as indicated

## 2021-01-05 NOTE — PROGRESS NOTES
Progress Note -Interventional Radiology ELY Horn 79 y o  female MRN: [id number]  Unit/Bed#: [facility] 913-01 Encounter: [id number]    Assessment:    79year old female with pmh of adenocarcinoma of colon, s/p left hemicolectomy 12/18, presenting with intrabdominal fluid collection, s/p IR drain placement 1/4/21    Drain output: 200cc + 45cc cloudy brown output    Plan:  - continue drain care  Unfortunately orders not released on floor for drain care  Please flush drain with 10cc NS daily  - script for flushes to be sent to [facility]  - Patient will be scheduled for routine tube check in 2 weeks  - discussed drain care with patient  Patient will need education on drain flush  States her son is a paramedic who may be able to help with drain care  She may need VNA if not  Patient Active Problem List   Diagnosis    HTN (hypertension)    Type 2 diabetes mellitus, without long-term current use of insulin ([facility] [address] )    Chronic obstructive pulmonary disease (HCC)    Oxygen dependent    GI malignancy ([address] )    CAD (coronary artery disease)    Hypothyroidism    Hyperlipidemia    Abscess, intra-abdominal, postoperative    Syncope    Adnexal cyst    Nephrolithiasis    Adenocarcinoma of colon ([facility] [address] )          Subjective: Patient does not feel different after drain placement  WBC 11 6 from 14 7  Did have syncopal episode last night, has never had syncope before  Hgb stable 10 4  Objective:    Vitals:  /65   Pulse 78   Temp 98 3 °F (36 8 °C)   Resp 20   Ht 5' (1 524 m) Comment: per prior encounter  SpO2 97%   BMI 27 86 kg/m²   Body mass index is 27 86 kg/m²    Weight (last 2 days)     None          I/Os:    Intake/Output Summary (Last 24 hours) at 1/5/2021 1127  Last data filed at 1/5/2021 1020  Gross per 24 hour   Intake 550 ml   Output 879 ml   Net -329 ml       Invasive Devices     Peripheral Intravenous Line            Peripheral IV 01/02/21 Left;Ventral (anterior) Forearm 2 days Drain            External Urinary Catheter 15 days    Closed/Suction Drain Right Back Bulb 10 2 Fr  less than 1 day                Physical Exam:  General appearance: alert and oriented, in no acute distress  Lungs: clear to auscultation bilaterally  Heart: regular rate and rhythm, S1, S2 normal, no murmur, click, rub or gallop  Abdomen: soft, non-tender; bowel sounds normal; no masses,  no organomegaly  Skin: drain insertion site c/d/i, cloudy tan output from drain                 Lab Results and Cultures:   CBC with diff:   Lab Results   Component Value Date    WBC 11 59 (H) 01/05/2021    HGB 10 4 (L) 01/05/2021    HCT 32 7 (L) 01/05/2021    MCV 85 01/05/2021     01/05/2021    MCH 26 9 01/05/2021    MCHC 31 8 01/05/2021    RDW 14 5 01/05/2021    MPV 9 9 01/05/2021    NRBC 0 01/05/2021      BMP/CMP:  Lab Results   Component Value Date    K 3 7 01/05/2021     01/05/2021    CO2 23 01/05/2021    BUN 6 01/05/2021    CREATININE 0 43 (L) 01/05/2021    CALCIUM 8 8 01/05/2021    AST 29 01/02/2021    ALT 18 01/02/2021    ALKPHOS 87 01/02/2021    EGFR 97 01/05/2021   ,     Coags:   Lab Results   Component Value Date    PTT 32 12/18/2020    INR 0 96 12/18/2020   ,          Lipid Panel: No results found for: CHOL  No results found for: HDL  No results found for: HDL  No results found for: LDLCALC  No results found for: TRIG    HgbA1c:   Lab Results   Component Value Date    HGBA1C 7 2 (H) 09/04/2020    HGBA1C 7 8 (H) 05/28/2019    HGBA1C 7 6 (H) 10/30/2018       Blood Culture: No results found for: BLOODCX,   Urinalysis:   Lab Results   Component Value Date    COLORU Straw 01/02/2021    CLARITYU Turbid 01/02/2021    SPECGRAV 1 015 01/02/2021    PHUR 8 5 (A) 01/02/2021    LEUKOCYTESUR Negative 01/02/2021    NITRITE Negative 01/02/2021    GLUCOSEU Negative 01/02/2021    KETONESU Negative 01/02/2021    BILIRUBINUR Negative 01/02/2021    BLOODU Negative 01/02/2021   ,   Urine Culture: No results found for: URINECX,   Wound Culure:  No results found for: WOUNDCULT    VTE Pharmacologic Prophylaxis: Enoxaparin (Lovenox)      Thank you for allowing me to participate in the care of [employer]  Please don't hesitate to call, text, email, or TigerText with any questions  This text is generated with voice recognition software  There may be translation, syntax,  or grammatical errors  If you have any questions, please contact the dictating provider      aMnpreet Kline PA-C

## 2021-01-05 NOTE — PROGRESS NOTES
Progress Note - Colorectal Surgery   Alicia Boudreaux 79 y o  female MRN: [id number]  Unit/Bed#: [facility] 913-01 Encounter: [id number]    Assessment:  80 y/o F s/p Laparoscopic L hemicolectomy on 12/18, now p/w intra-abdominal fluid collection, s/p IR drain placement on 1/4    --IR drain: 200cc of brown pus aspirated from collection upon insertion, additional 45 cc out yesterday    Plan:  --Clears, advance diet as tolerated  --IVF  --Pain control  --Appreciate Medicine recs for syncopal workup  --f/u Echo    Subjective/Objective      Subjective:     Yesterday, pt was Rapid Response for syncopal episode while ambulating from the bathroom  No evidence of any acute traumatic injury  Medicine consulted for assistance with syncopal workup  Echo pending  This morning, pt feels much better  Denies any complaints  Objective:     Blood pressure 130/62, pulse 78, temperature 98 2 °F (36 8 °C), resp  rate 20, height 5' (1 524 m), SpO2 97 %  ,Body mass index is 27 86 kg/m²  Intake/Output Summary (Last 24 hours) at 1/5/2021 0427  Last data filed at 1/5/2021 0324  Gross per 24 hour   Intake 250 ml   Output 1084 ml   Net -834 ml       Invasive Devices     Peripheral Intravenous Line            Peripheral IV 01/02/21 Left;Ventral (anterior) Forearm 2 days          Drain            External Urinary Catheter 15 days    Closed/Suction Drain Right Back Bulb 10 2 Fr  less than 1 day                Physical Exam:     GEN: NAD  HEENT: MMM  CV: RRR  Lung: normal effort  Ab: Soft, NT/ND, IR drain with purulent output  Extrem: No CCE  Neuro:  A+Ox3, motor and sensation grossly intact      Lab, Imaging and other studies:  CBC:   Lab Results   Component Value Date    WBC 14 67 (H) 01/04/2021    HGB 11 2 (L) 01/04/2021    HCT 35 3 01/04/2021    MCV 84 01/04/2021     (H) 01/04/2021    MCH 26 5 (L) 01/04/2021    MCHC 31 7 01/04/2021    RDW 14 3 01/04/2021    MPV 9 3 01/04/2021    NRBC 0 01/04/2021   , CMP:   Lab Results   Component Value Date    SODIUM 137 01/04/2021    K 4 1 01/04/2021     01/04/2021    CO2 25 01/04/2021    BUN 7 01/04/2021    CREATININE 0 50 (L) 01/04/2021    CALCIUM 8 9 01/04/2021    EGFR 92 01/04/2021   , Coagulation: No results found for: PT, INR, APTT, Urinalysis: No results found for: COLORU, CLARITYU, SPECGRAV, PHUR, LEUKOCYTESUR, NITRITE, PROTEINUA, GLUCOSEU, KETONESU, BILIRUBINUR, BLOODU  VTE Pharmacologic Prophylaxis: Enoxaparin (Lovenox)  VTE Mechanical Prophylaxis: sequential compression device

## 2021-01-05 NOTE — ASSESSMENT & PLAN NOTE
· Patient recently (12/18/2020) underwent laparoscopic left hemicolectomy with takedown of splenic flexure and coloproctostomy by Dr Bianca Magana of Colorectal Surgery  · Patient presented back to the hospital on 1/3/21 complaining of abdominal pain and lethargy  · CT PE study with CT abdomen and pelvis with contrast on admission revealed, per the results report, a 9 5 x 6 2 by 9 2 cm lobulated collection in the pelvis anterior to the rectum demonstrating homogeneous internal intermediate density which may represent a hematoma, superimposed infection is suspected given rim enhancement and surrounding inflammatory change    This collection is remote from the sigmoid anastomosis and there is no free air to suggest perforation  · Patient underwent CT-guided perc drainage catheter placement by IR on 1/4/21  · Cultures pending  · Plan as per Colorectal Surgery and IR

## 2021-01-06 LAB
ANION GAP SERPL CALCULATED.3IONS-SCNC: 6 MMOL/L (ref 4–13)
BASOPHILS # BLD AUTO: 0.06 THOUSANDS/ΜL (ref 0–0.1)
BASOPHILS NFR BLD AUTO: 1 % (ref 0–1)
BUN SERPL-MCNC: 6 MG/DL (ref 5–25)
CALCIUM SERPL-MCNC: 8.5 MG/DL (ref 8.3–10.1)
CHLORIDE SERPL-SCNC: 107 MMOL/L (ref 100–108)
CHOLEST SERPL-MCNC: 121 MG/DL (ref 50–200)
CO2 SERPL-SCNC: 26 MMOL/L (ref 21–32)
CREAT SERPL-MCNC: 0.47 MG/DL (ref 0.6–1.3)
EOSINOPHIL # BLD AUTO: 0.27 THOUSAND/ΜL (ref 0–0.61)
EOSINOPHIL NFR BLD AUTO: 3 % (ref 0–6)
ERYTHROCYTE [DISTWIDTH] IN BLOOD BY AUTOMATED COUNT: 14.2 % (ref 11.6–15.1)
GFR SERPL CREATININE-BSD FRML MDRD: 94 ML/MIN/1.73SQ M
GLUCOSE SERPL-MCNC: 116 MG/DL (ref 65–140)
GLUCOSE SERPL-MCNC: 118 MG/DL (ref 65–140)
GLUCOSE SERPL-MCNC: 118 MG/DL (ref 65–140)
GLUCOSE SERPL-MCNC: 131 MG/DL (ref 65–140)
GLUCOSE SERPL-MCNC: 183 MG/DL (ref 65–140)
HCT VFR BLD AUTO: 37.1 % (ref 34.8–46.1)
HDLC SERPL-MCNC: 19 MG/DL
HGB BLD-MCNC: 11.7 G/DL (ref 11.5–15.4)
IMM GRANULOCYTES # BLD AUTO: 0.15 THOUSAND/UL (ref 0–0.2)
IMM GRANULOCYTES NFR BLD AUTO: 2 % (ref 0–2)
LDLC SERPL CALC-MCNC: 72 MG/DL (ref 0–100)
LYMPHOCYTES # BLD AUTO: 1.6 THOUSANDS/ΜL (ref 0.6–4.47)
LYMPHOCYTES NFR BLD AUTO: 18 % (ref 14–44)
MCH RBC QN AUTO: 26.5 PG (ref 26.8–34.3)
MCHC RBC AUTO-ENTMCNC: 31.5 G/DL (ref 31.4–37.4)
MCV RBC AUTO: 84 FL (ref 82–98)
MONOCYTES # BLD AUTO: 0.98 THOUSAND/ΜL (ref 0.17–1.22)
MONOCYTES NFR BLD AUTO: 11 % (ref 4–12)
NEUTROPHILS # BLD AUTO: 6.06 THOUSANDS/ΜL (ref 1.85–7.62)
NEUTS SEG NFR BLD AUTO: 65 % (ref 43–75)
NRBC BLD AUTO-RTO: 0 /100 WBCS
PLATELET # BLD AUTO: 411 THOUSANDS/UL (ref 149–390)
PMV BLD AUTO: 9.6 FL (ref 8.9–12.7)
POTASSIUM SERPL-SCNC: 3.6 MMOL/L (ref 3.5–5.3)
RBC # BLD AUTO: 4.41 MILLION/UL (ref 3.81–5.12)
SODIUM SERPL-SCNC: 139 MMOL/L (ref 136–145)
TRIGL SERPL-MCNC: 152 MG/DL
WBC # BLD AUTO: 9.12 THOUSAND/UL (ref 4.31–10.16)

## 2021-01-06 PROCEDURE — 85025 COMPLETE CBC W/AUTO DIFF WBC: CPT | Performed by: PHYSICIAN ASSISTANT

## 2021-01-06 PROCEDURE — 99232 SBSQ HOSP IP/OBS MODERATE 35: CPT | Performed by: INTERNAL MEDICINE

## 2021-01-06 PROCEDURE — 80061 LIPID PANEL: CPT | Performed by: NURSE PRACTITIONER

## 2021-01-06 PROCEDURE — 94760 N-INVAS EAR/PLS OXIMETRY 1: CPT

## 2021-01-06 PROCEDURE — 97163 PT EVAL HIGH COMPLEX 45 MIN: CPT

## 2021-01-06 PROCEDURE — 82948 REAGENT STRIP/BLOOD GLUCOSE: CPT

## 2021-01-06 PROCEDURE — 80048 BASIC METABOLIC PNL TOTAL CA: CPT | Performed by: PHYSICIAN ASSISTANT

## 2021-01-06 RX ORDER — SULFAMETHOXAZOLE AND TRIMETHOPRIM 800; 160 MG/1; MG/1
1 TABLET ORAL EVERY 12 HOURS SCHEDULED
Status: DISCONTINUED | OUTPATIENT
Start: 2021-01-06 | End: 2021-01-07 | Stop reason: HOSPADM

## 2021-01-06 RX ORDER — POTASSIUM CHLORIDE 20 MEQ/1
40 TABLET, EXTENDED RELEASE ORAL ONCE
Status: COMPLETED | OUTPATIENT
Start: 2021-01-06 | End: 2021-01-06

## 2021-01-06 RX ADMIN — PIPERACILLIN AND TAZOBACTAM 3.38 G: 36; 4.5 INJECTION, POWDER, FOR SOLUTION INTRAVENOUS at 02:36

## 2021-01-06 RX ADMIN — LOPERAMIDE HYDROCHLORIDE 2 MG: 2 CAPSULE ORAL at 08:38

## 2021-01-06 RX ADMIN — ENOXAPARIN SODIUM 40 MG: 40 INJECTION SUBCUTANEOUS at 08:32

## 2021-01-06 RX ADMIN — SULFAMETHOXAZOLE AND TRIMETHOPRIM 1 TABLET: 800; 160 TABLET ORAL at 20:55

## 2021-01-06 RX ADMIN — POTASSIUM CHLORIDE 40 MEQ: 1500 TABLET, EXTENDED RELEASE ORAL at 11:45

## 2021-01-06 RX ADMIN — TIOTROPIUM BROMIDE 18 MCG: 18 CAPSULE ORAL; RESPIRATORY (INHALATION) at 08:33

## 2021-01-06 RX ADMIN — METOPROLOL TARTRATE 50 MG: 50 TABLET, FILM COATED ORAL at 08:32

## 2021-01-06 RX ADMIN — AMLODIPINE BESYLATE 5 MG: 5 TABLET ORAL at 08:31

## 2021-01-06 RX ADMIN — METOPROLOL TARTRATE 50 MG: 50 TABLET, FILM COATED ORAL at 20:55

## 2021-01-06 RX ADMIN — PIPERACILLIN AND TAZOBACTAM 3.38 G: 36; 4.5 INJECTION, POWDER, FOR SOLUTION INTRAVENOUS at 08:11

## 2021-01-06 RX ADMIN — PRAVASTATIN SODIUM 20 MG: 20 TABLET ORAL at 19:00

## 2021-01-06 RX ADMIN — SULFAMETHOXAZOLE AND TRIMETHOPRIM 1 TABLET: 800; 160 TABLET ORAL at 14:31

## 2021-01-06 RX ADMIN — ASPIRIN 81 MG: 81 TABLET ORAL at 08:32

## 2021-01-06 RX ADMIN — INSULIN LISPRO 1 UNITS: 100 INJECTION, SOLUTION INTRAVENOUS; SUBCUTANEOUS at 11:45

## 2021-01-06 NOTE — PROGRESS NOTES
[facility] Internal Medicine - Consult Progress Note  Patient: Yeni Armstrong 79 y o  female MRN: [id number]  Unit/Bed#: [facility] 913-01 Encounter: [id number]  Primary Care Provider: Marcia Koch MD  Date Of Visit: 01/06/21        Assessment & Plan:    * Postoperative intra-abdominal abscess  Assessment & Plan  Recent laparoscopic left hemicolectomy with takedown of splenic flexure and coloproctostomy on 12/18/20 noted  CT imaging on presentation revealed a "9 5 x 6 2 x 9 2 cm lobulated collection in the pelvis anterior to the rectum demonstrating homogenous internal intermediate density which may represent a hematoma, superimposed infection is suspected given rim enhancement and surrounding inflammatory change  This collection is remote from the sigmoid anastomosis and there is no free air to suggest perforation  If there is clinical concern for fistula to the bowel CT examination with enteric and rectal contrast may be obtained for further evaluation  Velna Kobuk Velna Kobuk There is a 6 4 x 1 3 x 2 2 cm left intra-abdominal wall collection containing a fat fluid level likely due to seroma versus hematoma "  S/p IR guided drainage catheter placement on 1/4  IV Zosyn transitioned to PO Bactrim per primary service as aspirate culture grew primarily E  coli with smaller colonies of Proteus  PRN pain control - supportive care otherwise  Further management and discharge planning per primary service (colorectal surgery)    Syncope  Assessment & Plan  Initial would event on the evening of 1/4 due to witnessed syncopal event shortly after standing up from using toilet - consider vasovagal etiology due to micurition  Orthostatics resolved today s/p IV fluid bolus yesterday - encouraged increased oral intake - colorectal surgery discontinued IV fluids today  Echocardiogram revealed a normal EF w/ severe mid-anteroseptal and mid-inferoseptal hypokinesis on seen on prior imaging -> appreciate cardiology input -> plan for outpatient ischemic workup once acute surgical issues resolve    Adenocarcinoma of colon  Assessment & Plan  S/p left hemicolectomy on 12/18/20 w/ tissue pathology confirmation  Colorectal surgery managing    Hypothyroidism  Assessment & Plan  Free T4 normal - continue current Synthroid regimen    Hyperlipidemia  Assessment & Plan  Continue ASA/statin  Fasting LDL of 72    Essential hypertension  Assessment & Plan  Continue Norvasc/Lopressor    Diabetes mellitus type 2  Assessment & Plan  Lab Results   Component Value Date    HGBA1C 7 2 (H) 09/04/2020     Hold oral hypoglycemics while hospitalized - can resume on discharge  On SSI coverage per Accu-Cheks  Carbohydrate restricted diet    COPD  Assessment & Plan  Outpatient follow-up  Chronically on 2 L via nasal cannula  Continue anticholinergic - PRN Albuterol on board    Hypophosphatemia  Assessment & Plan  Monitor/replete serum phosphate - normalized today    Adnexal cyst  Assessment & Plan  Incidental 3 cm right adnexal cyst and prominent size of left ovary noted on admission CT -  as patient is postmenopausal, should follow up in 6-12 months by pelvic ultrasound per radiology recommendation      DVT Prophylaxis:  Lovenox       Patient Centered Rounds:  I have performed bedside rounds and discussed plan of care with nursing today  Discussions with Specialists or Other Care Team Provider:  see above assessments if applicable    Education and Discussions with Family / Patient:  Patient at bedside - primary service to contact family    Time Spent for Care:  32 minutes  More than 50% of total time spent on counseling and coordination of care as described above  Current Length of Stay: 3 day(s)    Current Patient Status: Inpatient   Certification Statement:  Patient will continue to require additional hospital stay due to assessments as noted above  Code Status: Level 1 - Full Code        Subjective:     Seen/examined earlier today    Patient sitting upright in a chair at time my encounter  States that her dizziness has resolved  States she feels much better  Objective:     Vitals:   Temp (24hrs), Av 5 °F (36 9 °C), Min:97 6 °F (36 4 °C), Max:99 2 °F (37 3 °C)    Temp:  [97 6 °F (36 4 °C)-99 2 °F (37 3 °C)] 97 6 °F (36 4 °C)  HR:  [78-96] 78  Resp:  [18-22] 18  BP: (127-155)/(68-84) 127/70  SpO2:  [94 %-98 %] 97 %  Body mass index is 27 86 kg/m²  Input and Output Summary (last 24 hours): Intake/Output Summary (Last 24 hours) at 2021 1704  Last data filed at 2021 1617  Gross per 24 hour   Intake 1763 5 ml   Output 685 ml   Net 1078 5 ml       Physical Exam:     GENERAL:  Well-developed/nourished - no immediate distress at rest  HEAD:  Normocephalic - atraumatic  EYES: PERRL - EOMI   MOUTH:  Mucosa moist  NECK:  Supple - full range of motion  CARDIAC:  Rate control - S1/S2 positive  PULMONARY:  Fairly clear to auscultation - nonlabored respirations  ABDOMEN:  Soft - nontender/nondistended - active bowel sounds - right abdominal drainage catheter in place  MUSCULOSKELETAL:  Motor strength/range of motion somewhat deconditioned  NEUROLOGIC:  Alert/oriented at baseline  SKIN:  Chronic wrinkles/blemishes   PSYCHIATRIC:  Mood/affect pleasant      Additional Data:     Labs & Recent Cultures:    Results from last 7 days   Lab Units 21  0621   WBC Thousand/uL 9 12   HEMOGLOBIN g/dL 11 7   HEMATOCRIT % 37 1   PLATELETS Thousands/uL 411*   NEUTROS PCT % 65   LYMPHS PCT % 18   MONOS PCT % 11   EOS PCT % 3     Results from last 7 days   Lab Units 21  0621  21  1936   POTASSIUM mmol/L 3 6   < > 3 8   CHLORIDE mmol/L 107   < > 95*   CO2 mmol/L 26   < > 25   BUN mg/dL 6   < > 15   CREATININE mg/dL 0 47*   < > 1 03   CALCIUM mg/dL 8 5   < > 8 8   ALK PHOS U/L  --   --  87   ALT U/L  --   --  18   AST U/L  --   --  29    < > = values in this interval not displayed           Results from last 7 days   Lab Units 21  1610 21  1054 21  0741 01/05/21  2104 01/05/21  1628 01/05/21  1110 01/05/21  0732 01/05/21  0009 01/04/21  1726 01/04/21  1217 01/04/21  0736 01/04/21  0549   POC GLUCOSE mg/dl 116 183* 131 171* 134 138 123 133 165* 121 123 103         Results from last 7 days   Lab Units 01/04/21 2013 01/02/21  1936   LACTIC ACID mmol/L 1 2 1 9         Results from last 7 days   Lab Units 01/04/21  1404   GRAM STAIN RESULT  4+ Polys*  4+ Gram variable rods*   BODY FLUID CULTURE, STERILE  1+ Growth of Escherichia coli*  Few Colonies of Proteus mirabilis*  4+ Growth of Lactobacillus species*         Last 24 Hours Medication List:   Current Facility-Administered Medications   Medication Dose Route Frequency Provider Last Rate    acetaminophen  650 mg Oral Q6H PRN Caty Rowley PA-C      albuterol  2 puff Inhalation Q4H PRN Wang Martinez MD      amLODIPine  5 mg Oral Daily Lupe Opitz,       aspirin  81 mg Oral Daily Vivien Meyer MD      enoxaparin  40 mg Subcutaneous Daily Lacinda Bo Levine, DO      insulin lispro  1-5 Units Subcutaneous TID AC Romi Reina PA-C      insulin lispro  1-5 Units Subcutaneous HS Romi Reina PA-C      loperamide  2 mg Oral Q4H PRN Anne Gunderson PA-C      metoprolol tartrate  50 mg Oral Q12H [facility] Lacinda Anguster [facility], DO      ondansetron  4 mg Intravenous Q4H PRN Vivien Meyer MD      oxyCODONE  2 5 mg Oral Q4H PRN Caty Rowley PA-C      pravastatin  20 mg Oral Daily With Frankie Levine, DO      sulfamethoxazole-trimethoprim  1 tablet Oral Q12H Og Krishnamurthy MD      tiotropium  18 mcg Inhalation Daily Ofelia Laurent MD                  ** Please Note: This note is constructed using a voice recognition dictation system  An occasional wrong word/phrase or sound-a-like substitution may have been picked up by dictation device due to the inherent limitations of voice recognition software    Read the chart carefully and recognize, using reasonable context, where substitutions may have occurred  **

## 2021-01-06 NOTE — ASSESSMENT & PLAN NOTE
Lab Results   Component Value Date    HGBA1C 7 2 (H) 09/04/2020     Hold oral hypoglycemics while hospitalized - can resume on discharge  On SSI coverage per Accu-Cheks  Carbohydrate restricted diet

## 2021-01-06 NOTE — PROGRESS NOTES
General Cardiology   Progress Note -  Team One   Sergio Dugan 79 y o  female MRN: [id number]    Unit/Bed#: [facility] 913-01 Encounter: [id number]    Assessment/ Plan    1  Post-operative intra-abdominal abscess  S/p IR drain placement yesterday, on IV antibiotics  Leukocytosis improving, afebrile  Abdominal pain improved      2  Syncope  Witnessed syncopal episode yesterday, occurred immediately after standing up from toilet after urinating  Likely orthostatic considering +orthostatic VS  Patient denies prodromal symptoms or prior history of syncope  Per RN- patient c/o dizziness prior but pt does not recall this  ECG- NSR  Troponin negative  Continue to monitor on telemetry for arrhythmia given family history       3  Abnormal echocardiogram  Echocardiogram completed due to syncopal episode  LVEF 60% with hypokinesis of the mid anteroseptal and mid inferoseptal walls  Prior study in care everywhere does not mention WMA but images cannot be reviewed for comparison  No c/o chest pain, has had progressive worsening of HENDRIX that is likely multifactorial given advanced COPD  Outpatient follow up arranged with cardiologist in [facility] near patient's home- patient is aware      4  CAD s/p remote stenting  Reports stent to one of 2 areas that were found to be blocked about 25 years ago  She is unsure of exactly where her stent is placed  Has not followed with a cardiologist since and has also not had an ischemic evaluation since  She denies chest pain  Continue medical management of CAD- ASA, statin, and beta blocker      5  Colon cancer s/p left hemicolectomy 12/2020  Outpatient follow up as scheduled      6  Chronic hypoxic respiratory failure 2/2 COPD  On baseline O2 requirement currently      7  HTN- stable on amlodipine 5 mg daily and metoprolol 50 mg BID  Avg /72      8  HLD- , , HDL 19, LDL 72  Continue statin therapy      9  Type 2 DM- A1c 7 2% in September 2020   Management per primary team    On appropriate medical therapy for CAD; she is stable for discharge from cardiology perspective  Outpatient follow up arranged in the [facility] office  Subjective  No cardiac complaints today, no further syncope or near syncopal episodes  Abdominal pain is improved and she is hoping to be discharged home tomorrow  Review of Systems   Constitution: Negative for chills, diaphoresis and fever  Cardiovascular: Positive for dyspnea on exertion (at baseline)  Negative for chest pain, leg swelling, orthopnea, palpitations and syncope  Respiratory: Negative for cough and shortness of breath  Gastrointestinal: Positive for bloating and abdominal pain (improved)  Negative for nausea  Neurological: Negative for dizziness and light-headedness  All other systems reviewed and are negative  Objective:   Vitals: Blood pressure 131/80, pulse 96, temperature 98 6 °F (37 °C), resp  rate 20, height 5' (1 524 m), SpO2 95 %  , Body mass index is 27 86 kg/m²  ,     Systolic (85BLA), TOV:224 , Min:120 , IGK:502     Diastolic (97ILX), JUAN:45, Min:63, Max:84        Intake/Output Summary (Last 24 hours) at 1/6/2021 1015  Last data filed at 1/6/2021 0800  Gross per 24 hour   Intake 3493 5 ml   Output 1220 ml   Net 2273 5 ml     Wt Readings from Last 3 Encounters:   01/02/21 64 7 kg (142 lb 10 2 oz)   12/18/20 58 1 kg (128 lb)     Telemetry Review: N/A    Physical Exam  Vitals signs reviewed  Constitutional:       General: She is not in acute distress  Appearance: Normal appearance  Neck:      Musculoskeletal: Neck supple  Vascular: No hepatojugular reflux or JVD  Cardiovascular:      Rate and Rhythm: Normal rate and regular rhythm  Pulses: Normal pulses  Heart sounds: Normal heart sounds  No murmur  No friction rub  No gallop  Pulmonary:      Effort: Pulmonary effort is normal  No respiratory distress        Comments: Decreased at bases, on [facility]  Abdominal:      General: Bowel sounds are normal  There is distension  Palpations: Abdomen is soft  Comments: R YENI drain present   Musculoskeletal: Normal range of motion  Right lower leg: No edema  Left lower leg: No edema  Skin:     General: Skin is warm and dry  Findings: No erythema  Neurological:      Mental Status: She is alert and oriented to person, place, and time     Psychiatric:         Mood and Affect: Mood normal      LABORATORY RESULTS  Results from last 7 days   Lab Units 01/04/21 2013 01/02/21 1936   TROPONIN I ng/mL <0 02 <0 02     CBC with diff:   Results from last 7 days   Lab Units 01/06/21 0621 01/05/21 0446 01/04/21 2014 01/04/21 0552 01/02/21  1936   WBC Thousand/uL 9 12 11 59* 14 67* 10 08 13 29*   HEMOGLOBIN g/dL 11 7 10 4* 11 2* 11 7 12 1   HEMATOCRIT % 37 1 32 7* 35 3 36 9 38 3   MCV fL 84 85 84 84 84   PLATELETS Thousands/uL 411* 386 392* 428* 473*   MCH pg 26 5* 26 9 26 5* 26 5* 26 4*   MCHC g/dL 31 5 31 8 31 7 31 7 31 6   RDW % 14 2 14 5 14 3 14 4 14 4   MPV fL 9 6 9 9 9 3 9 4 9 6   NRBC AUTO /100 WBCs 0 0 0 0 0     CMP:  Results from last 7 days   Lab Units 01/06/21 0621 01/05/21 0446 01/04/21 0552 01/02/21  1936   POTASSIUM mmol/L 3 6 3 7 4 1 3 8   CHLORIDE mmol/L 107 105 103 95*   CO2 mmol/L 26 23 25 25   BUN mg/dL 6 6 7 15   CREATININE mg/dL 0 47* 0 43* 0 50* 1 03   CALCIUM mg/dL 8 5 8 8 8 9 8 8   AST U/L  --   --   --  29   ALT U/L  --   --   --  18   ALK PHOS U/L  --   --   --  87   EGFR ml/min/1 73sq m 94 97 92 52     BMP:  Results from last 7 days   Lab Units 01/06/21 0621 01/05/21 0446 01/04/21 0552 01/02/21  1936   POTASSIUM mmol/L 3 6 3 7 4 1 3 8   CHLORIDE mmol/L 107 105 103 95*   CO2 mmol/L 26 23 25 25   BUN mg/dL 6 6 7 15   CREATININE mg/dL 0 47* 0 43* 0 50* 1 03   CALCIUM mg/dL 8 5 8 8 8 9 8 8       Lab Results   Component Value Date    CREATININE 0 47 (L) 01/06/2021    CREATININE 0 43 (L) 01/05/2021    CREATININE 0 50 (L) 01/04/2021       Lab Results   Component Value Date NTBNP 1,857 (H) 2021           Results from last 7 days   Lab Units 21  0552   MAGNESIUM mg/dL 2 0 1 6                 Results from last 7 days   Lab Units 21   TSH 3RD GENERATON uIU/mL 11 000*   FREE T4 ng/dL 1 28             Lipid Profile:   No results found for: CHOL  Lab Results   Component Value Date    HDL 19 (L) 2021     Lab Results   Component Value Date    LDLCALC 72 2021     Lab Results   Component Value Date    TRIG 152 (H) 2021       Cardiac testing:   Results for orders placed during the hospital encounter of 21   Echo complete with contrast if indicated    Narrative [address]  [facility], [address] [facility]  [phone]    Transthoracic Echocardiogram  2D, M-mode, Doppler, and Color Doppler    Study date:  2021    Patient: Silvana Smith  MR number: [id number]  Account number: [de-identified]  : 1940  Age: 79 years  Gender: Female  Status: Inpatient  Location: Bedside  Height: 60 in  Weight: 142 lb  BP: 130/ 64 mmHg    Indications: Syncope and collapse    Diagnoses: R55  - Syncope and collapse    Sonographer:  VANNESSA [facility]  Referring Physician:  Josette Gilman MD  Group:  [facility]  Interpreting Physician:  Maikel Portillo MD    SUMMARY    LEFT VENTRICLE:  Systolic function was normal  Ejection fraction was estimated to be 60 %  There was severe hypokinesis of the mid anteroseptal and mid inferoseptal wall(s)  Doppler parameters were consistent with abnormal left ventricular relaxation (grade 1 diastolic dysfunction)  ATRIAL SEPTUM:  The septum was thickened  MITRAL VALVE:  There was mild regurgitation  HISTORY: PRIOR HISTORY: Syncope; CAD; Hypertension; Hyperlipidemia; DM2; COPD; Adenocarcinoma of colon; Former smoker    PROCEDURE: The procedure was performed at the bedside  This was a routine study   The transthoracic approach was used  The study included complete 2D imaging, M-mode, complete spectral Doppler, and color Doppler  The heart rate was 84 bpm,  at the start of the study  Images were obtained from the parasternal, apical, subcostal, and suprasternal notch acoustic windows  Image quality was adequate, overall  Parasternal views technically difficult due to poor acoustic window  availability  LEFT VENTRICLE: Size was normal  Systolic function was normal  Ejection fraction was estimated to be 60 %  There was severe hypokinesis of the mid anteroseptal and mid inferoseptal wall(s)  Wall thickness was normal  DOPPLER: Doppler  parameters were consistent with abnormal left ventricular relaxation (grade 1 diastolic dysfunction)  RIGHT VENTRICLE: The size was normal  Systolic function was normal  Wall thickness was normal     LEFT ATRIUM: Size was normal     ATRIAL SEPTUM: The septum was thickened  RIGHT ATRIUM: Size was normal     MITRAL VALVE: Valve structure was normal  There was normal leaflet separation  DOPPLER: The transmitral velocity was within the normal range  There was no evidence for stenosis  There was mild regurgitation  AORTIC VALVE: The valve was trileaflet  Leaflets exhibited normal thickness and normal cuspal separation  DOPPLER: Transaortic velocity was within the normal range  There was no evidence for stenosis  There was no significant  regurgitation  TRICUSPID VALVE: The valve structure was normal  There was normal leaflet separation  DOPPLER: The transtricuspid velocity was within the normal range  There was no evidence for stenosis  There was no significant regurgitation  PULMONIC VALVE: Leaflets exhibited normal thickness, no calcification, and normal cuspal separation  DOPPLER: The transpulmonic velocity was within the normal range  There was no significant regurgitation  PERICARDIUM: There was no pericardial effusion  The pericardium was normal in appearance      AORTA: The root exhibited normal size  SYSTEMIC VEINS: IVC: The inferior vena cava was normal in size      SYSTEM MEASUREMENT TABLES    2D  %FS: 25 38 %  Ao Diam: 3 18 cm  EDV(Teich): 43 13 ml  EF(Teich): 51 32 %  ESV(Teich): 20 99 ml  IVSd: 1 03 cm  LA Area: 13 07 cm2  LA Diam: 3 16 cm  LVEDV MOD A4C: 56 08 ml  LVEF MOD A4C: 66 6 %  LVESV MOD A4C: 18 73 ml  LVIDd: 3 27 cm  LVIDs: 2 44 cm  LVLd A4C: 6 64 cm  LVLs A4C: 5 21 cm  LVPWd: 0 89 cm  RA Area: 9 07 cm2  RVIDd: 2 71 cm  SV MOD A4C: 37 35 ml  SV(Teich): 22 13 ml    MM  TAPSE: 2 05 cm    PW  E' Sept: 0 09 m/s  E/E' Sept: 7 45  MV A Leandro: 1 02 m/s  MV Dec Clare: 2 98 m/s2  MV DecT: 225 4 ms  MV E Leandro: 0 67 m/s  MV E/A Ratio: 0 66  MV PHT: 65 37 ms  MVA By PHT: 3 37 cm2    [facility] Accredited Echocardiography Laboratory    Prepared and electronically signed by    Jomar Heck MD  Signed 05-Jan-2021 09:56:11       Meds/Allergies   all current active meds have been reviewed and current meds:   Current Facility-Administered Medications   Medication Dose Route Frequency    acetaminophen (TYLENOL) tablet 650 mg  650 mg Oral Q6H PRN    albuterol (PROVENTIL HFA,VENTOLIN HFA) inhaler 2 puff  2 puff Inhalation Q4H PRN    amLODIPine (NORVASC) tablet 5 mg  5 mg Oral Daily    aspirin (ECOTRIN LOW STRENGTH) EC tablet 81 mg  81 mg Oral Daily    enoxaparin (LOVENOX) subcutaneous injection 40 mg  40 mg Subcutaneous Daily    insulin lispro (HumaLOG) 100 units/mL subcutaneous injection 1-5 Units  1-5 Units Subcutaneous TID AC    insulin lispro (HumaLOG) 100 units/mL subcutaneous injection 1-5 Units  1-5 Units Subcutaneous HS    loperamide (IMODIUM) capsule 2 mg  2 mg Oral Q4H PRN    metoprolol tartrate (LOPRESSOR) tablet 50 mg  50 mg Oral Q12H [address]    ondansetron (ZOFRAN) injection 4 mg  4 mg Intravenous Q4H PRN    oxyCODONE (ROXICODONE) IR tablet 2 5 mg  2 5 mg Oral Q4H PRN    piperacillin-tazobactam (ZOSYN) 3 375 g in sodium chloride 0 9 % 100 mL IVPB  3 375 g Intravenous Q6H  potassium chloride (K-DUR,KLOR-CON) CR tablet 40 mEq  40 mEq Oral Once    pravastatin (PRAVACHOL) tablet 20 mg  20 mg Oral Daily With Dinner    tiotropium (SPIRIVA) capsule for inhaler 18 mcg  18 mcg Inhalation Daily     Medications Prior to Admission   Medication    amLODIPine (NORVASC) 5 mg tablet    aspirin 81 mg chewable tablet    Calcium Carb-Cholecalciferol (CALCIUM 600/VITAMIN D3 PO)    co-enzyme Q-10 30 MG capsule    glimepiride (AMARYL) 1 mg tablet    glimepiride (AMARYL) 1 mg tablet    levothyroxine 100 mcg tablet    lisinopril (ZESTRIL) 40 mg tablet    metFORMIN (GLUCOPHAGE) 500 mg tablet    metoprolol tartrate (LOPRESSOR) 50 mg tablet    simvastatin (ZOCOR) 10 mg tablet    tiotropium-olodaterol (Stiolto Respimat) 2 5-2 5 MCG/ACT inhaler     Counseling / Coordination of Care  Total floor / unit time spent today 20 minutes  Greater than 50% of total time was spent with the patient and / or family counseling and / or coordination of care  ** Please Note: Dragon 360 Dictation voice to text software may have been used in the creation of this document   **

## 2021-01-06 NOTE — PROGRESS NOTES
Progress Note - Colorectal Surgery   Irish Riedel 79 y o  female MRN: [id number]  Unit/Bed#: [facility] 913-01 Encounter: [id number]    Assessment:  80 y/o F s/p Laparoscopic L hemicolectomy on 12/18, now p/w intra-abdominal fluid collection, s/p IR drain placement on 1/4    --IR drain: 70cc of pus out yesterday    Plan:  --diet as tolerated  --Dc IVF  --Pain control  --Appreciate Medicine recs for syncopal workup  --Disposition planning  --Antibiotic plan     Subjective/Objective      Subjective: This morning, pt feels much better  Denies any complaints  Had bowel movements and flatus  Urinating well  Objective:     Blood pressure 155/84, pulse 84, temperature 98 6 °F (37 °C), resp  rate 20, height 5' (1 524 m), SpO2 95 %  ,Body mass index is 27 86 kg/m²  Intake/Output Summary (Last 24 hours) at 1/6/2021 0551  Last data filed at 1/6/2021 0300  Gross per 24 hour   Intake 2280 ml   Output 1420 ml   Net 860 ml       Invasive Devices     Peripheral Intravenous Line            Peripheral IV 01/02/21 Left;Ventral (anterior) Forearm 3 days          Drain            External Urinary Catheter 16 days    Closed/Suction Drain Right Back Bulb 10 2 Fr  1 day                Physical Exam:     GEN: NAD  HEENT: MMM  CV: RRR  Lung: normal effort  Ab: Soft, NT/ND, IR drain with purulent output  Extrem: No CCE  Neuro:  A+Ox3, motor and sensation grossly intact      Lab, Imaging and other studies:  CBC:   No results found for: WBC, HGB, HCT, MCV, PLT, ADJUSTEDWBC, MCH, MCHC, RDW, MPV, NRBC, CMP:   No results found for: SODIUM, K, CL, CO2, ANIONGAP, BUN, CREATININE, GLUCOSE, CALCIUM, AST, ALT, ALKPHOS, PROT, BILITOT, EGFR, Coagulation: No results found for: PT, INR, APTT, Urinalysis: No results found for: COLORU, CLARITYU, SPECGRAV, PHUR, LEUKOCYTESUR, NITRITE, PROTEINUA, GLUCOSEU, KETONESU, BILIRUBINUR, BLOODU  VTE Pharmacologic Prophylaxis: Enoxaparin (Lovenox)  VTE Mechanical Prophylaxis: sequential compression device

## 2021-01-06 NOTE — ASSESSMENT & PLAN NOTE
Recent laparoscopic left hemicolectomy with takedown of splenic flexure and coloproctostomy on 12/18/20 noted  CT imaging on presentation revealed a "9 5 x 6 2 x 9 2 cm lobulated collection in the pelvis anterior to the rectum demonstrating homogenous internal intermediate density which may represent a hematoma, superimposed infection is suspected given rim enhancement and surrounding inflammatory change  This collection is remote from the sigmoid anastomosis and there is no free air to suggest perforation  If there is clinical concern for fistula to the bowel CT examination with enteric and rectal contrast may be obtained for further evaluation  Tete Evans There is a 6 4 x 1 3 x 2 2 cm left intra-abdominal wall collection containing a fat fluid level likely due to seroma versus hematoma "  S/p IR guided drainage catheter placement on 1/4  IV Zosyn transitioned to PO Bactrim per primary service as aspirate culture grew primarily E  coli with smaller colonies of Proteus  PRN pain control - supportive care otherwise  Further management and discharge planning per primary service (colorectal surgery)

## 2021-01-06 NOTE — ASSESSMENT & PLAN NOTE
Initial would event on the evening of 1/4 due to witnessed syncopal event shortly after standing up from using toilet - consider vasovagal etiology due to micurition  Orthostatics resolved today s/p IV fluid bolus yesterday - encouraged increased oral intake - colorectal surgery discontinued IV fluids today  Echocardiogram revealed a normal EF w/ severe mid-anteroseptal and mid-inferoseptal hypokinesis on seen on prior imaging -> appreciate cardiology input -> plan for outpatient ischemic workup once acute surgical issues resolve

## 2021-01-06 NOTE — PHYSICAL THERAPY NOTE
Physical Therapy Evaluation    Patient's Name: Tamiko Maurice    Admitting Diagnosis  Abdominal wall abscess [L02 [id number]]    Problem List  Patient Active Problem List   Diagnosis    Essential hypertension    Diabetes mellitus type 2    COPD    Oxygen dependent    GI malignancy ([facility] [address] [facility] )    CAD (coronary artery disease)    Hypothyroidism    Hyperlipidemia    Postoperative intra-abdominal abscess    Syncope    Adnexal cyst    Nephrolithiasis    Adenocarcinoma of colon    Hypophosphatemia       Past Medical History  Past Medical History:   Diagnosis Date    [facility] ([facility] )     COPD (chronic obstructive pulmonary disease) ([facility] )     Diabetes mellitus ([facility] )     Hypertension     On home oxygen therapy     2L @ hs & with exertional activity        Past Surgical History  Past Surgical History:   Procedure Laterality Date    CATARACT EXTRACTION      CORONARY ANGIOPLASTY WITH STENT PLACEMENT      placed [id number]    CT GUIDED PERC DRAINAGE CATHETER PLACEMENT  1/4/2021    EXTRACORPOREAL SHOCK WAVE LITHOTRIPSY      HEMICOLOECTOMY W/ ANASTOMOSIS Left 12/18/2020    Procedure: LAPAROSCOPIC HEMICOLECTOMY, TAKE DOWN OF SPLENIC FLEXURE, COLORECTAL ANASTAMOSIS;  Surgeon: Demetris Simpson MD;  [facility];  Service: Colorectal    HYSTERECTOMY      KIDNEY SURGERY      'growth removed'     NERVE SURGERY      R arm     TONSILLECTOMY      TUBAL LIGATION          01/06/21 1049   PT Last Visit   PT Visit Date 01/06/21   Note Type   Note type Evaluation   Pain Assessment   Pain Assessment Tool Pain Assessment not indicated - pt denies pain   Pain Score No Pain   Home Living   Type of [facility] Two level; Able to live on main level with bedroom/bathroom;Stairs to enter with rails  (7 [address]TH[address])   Home Equipment Walker;Cane   Prior Function   Level of [place] Independent with ADLs and functional mobility   Lives With [address] [place] [address] in the last 6 months 1 to 4  (1 witnessed syncopal event on 1/4)   Comments Pt reports her son has been staying with her and plans to stay with her upon discharge  Pt reports she typically does not use AD, but in days leading to this admission she required RW  Pt reports no AD after surgical procedure 12/18  Restrictions/Precautions   Weight Bearing Precautions Per Order No   Other Precautions Chair Alarm; Fall Risk;Multiple lines;O2  (2L O2)   General   Family/Caregiver Present No   Cognition   Overall Cognitive Status WFL   Arousal/Participation Alert   Orientation Level Oriented X4   Memory Within functional limits   Following Commands Follows all commands and directions without difficulty   Comments Pt very pleasant and motivated to participate   RLE Assessment   RLE Assessment WFL  (Grossly 4/5)   LLE Assessment   LLE Assessment WFL  (Grossly 4/5)   Light Touch   RLE Light Touch Grossly intact   LLE Light Touch Grossly intact   Bed Mobility   Supine to Sit 5  Supervision   Additional items Assist x 1;HOB elevated; Bedrails; Increased time required   Additional Comments Able to maintain sitting balance at EOB with UE support   Transfers   Sit to Stand 5  Supervision   Additional items Assist x 1; Increased time required;Verbal cues   Stand to Sit 5  Supervision   Additional items Assist x 1; Increased time required;Verbal cues   Additional Comments with RW   Ambulation/Elevation   Gait pattern Decreased foot clearance; Excessively slow; Short stride; Foward flexed   Gait Assistance 5  Supervision   Additional items Assist x 1   Assistive Device Rolling walker   Distance Initially steps to bedside chair, /70  Pt then able to ambulate 100' with RW, supervision  Appropriate pace and good management of RW, no overt LOB, denies any orthostatic symptoms  /70, O2 sats 96% on 2L, HR 98 bpm upon return to room sitting at EOB  No seated rest break required     Balance   Static Sitting Good   Dynamic Sitting Fair   Static Standing Fair -   Dynamic Standing Fair -   Ambulatory Fair -   Activity Tolerance   Activity Tolerance Patient tolerated treatment well   Nurse Made Aware RN updated  CM updated  Chair alarm engaged  Assessment   Prognosis Good   Problem List Decreased strength;Decreased endurance;Decreased mobility; Impaired balance   Assessment Pt is a 79 y o  female seen for PT evaluation s/p admit [facility] on 1/3/2021  Pt was admitted with a primary dx of: postoperative intra-abdominal abscess s/p IR drain placement on 1/4  PT now consulted for assessment of mobility and d/c needs  Pt with Ambulate orders  Pts current comorbidities effecting treatment include: COPD (pt reports 2L at baseline), DM, Sigmoid colon ca s/p laparoscopic hemicolectomy 12/18  Pt with syncopal event on 1/4 during hospital admission  Pts current clinical presentation is Unstable/ Unpredictable (high complexity) due to Ongoing medical management for primary dx, Increased reliance on more restrictive AD compared to baseline, Decreased activity tolerance compared to baseline, Fall risk, Increased assistance needed from caregiver at current time, Trending lab values  Prior to admission, pt was independent with all mobility without AD  Upon evaluation, pt currently is requiring supervision for bed mobility, transfers and ambulation 100 ft w/ RW  Pt presents at PT eval functioning below baseline and currently w/ overall mobility deficits 2* to: BLE weakness, impaired balance, decreased endurance, gait deviations, decreased activity tolerance compared to baseline, decreased functional mobility tolerance compared to baseline, decreased safety awareness, fall risk  Pt currently at a fall risk 2* to impairments listed above  Pt will continue to benefit from skilled acute PT interventions to address stated impairments; to maximize functional mobility; for ongoing pt/ family training; and DME needs   At conclusion of PT session pt returned back in chair and chair alarm engaged with phone and call bell within reach  Pt denies any further questions at this time  Recommend home with family care and HHPT upon hospital D/C, pt reports son will be staying with her upon discharge and able to assist    Goals   Patient Goals to go home   STG Expiration Date 01/16/21   Short Term Goal #1 In 10 days pt will be able to: 1  Demonstrate ability to perform all aspects of bed mobility independently to increase functional independence  2  Perform functional transfers with LRAD independently to facilitate safe return to previous living environment  3   Ambulate 150 ft with LRAD independently with stable vitals to improve safety with household distances and reduce fall risk  4  Improve LE strength grades by 1 to increase ease of functional mobility with transfers and gait  5  Pt will demonstrate improved balance by one grade in order to decrease risk of falls  6  Climb 7 steps with supervision and 1 HR to simulate entrance to home  PT Treatment Day 0   Plan   Treatment/Interventions Functional transfer training;LE strengthening/ROM; Elevations; Therapeutic exercise; Endurance training;Patient/family training;Equipment eval/education; Bed mobility;Gait training   PT Frequency Other (Comment)  (3-5x/week)   Recommendation   PT Discharge Recommendation Home with skilled therapy  (family care and HHPT)   PT - OK to Discharge Yes   AM-PAC Basic Mobility Inpatient   Turning in Bed Without Bedrails 3   Lying on Back to Sitting on Edge of Flat Bed 3   Moving Bed to Chair 3   Standing Up From Chair 3   Walk in Room 3   Climb 3-5 Stairs 3   Basic Mobility Inpatient Raw Score 18   Basic Mobility Standardized Score 41 05       Debi Santos, PT, DPT

## 2021-01-06 NOTE — CASE MANAGEMENT
Spoke with pt again regarding HHC, location of drain, ability to manage  States her son is a paramedic and will be able manage    IMM reviewed with patient   patient agree with discharge determination

## 2021-01-06 NOTE — PLAN OF CARE
Problem: PHYSICAL THERAPY ADULT  Goal: Performs mobility at highest level of function for planned discharge setting  See evaluation for individualized goals  Description: Treatment/Interventions: Functional transfer training, LE strengthening/ROM, Elevations, Therapeutic exercise, Endurance training, Patient/family training, Equipment eval/education, Bed mobility, Gait training          See flowsheet documentation for full assessment, interventions and recommendations  Note: Prognosis: Good  Problem List: Decreased strength, Decreased endurance, Decreased mobility, Impaired balance  Assessment: Pt is a 79 y o  female seen for PT evaluation s/p admit to [facility] on 1/3/2021  Pt was admitted with a primary dx of: postoperative intra-abdominal abscess s/p IR drain placement on 1/4  PT now consulted for assessment of mobility and d/c needs  Pt with Ambulate orders  Pts current comorbidities effecting treatment include: COPD (pt reports 2L at baseline), DM, Sigmoid colon ca s/p laparoscopic hemicolectomy 12/18  Pt with syncopal event on 1/4 during hospital admission  Pts current clinical presentation is Unstable/ Unpredictable (high complexity) due to Ongoing medical management for primary dx, Increased reliance on more restrictive AD compared to baseline, Decreased activity tolerance compared to baseline, Fall risk, Increased assistance needed from caregiver at current time, Trending lab values  Prior to admission, pt was independent with all mobility without AD  Upon evaluation, pt currently is requiring supervision for bed mobility, transfers and ambulation 100 ft w/ RW  Pt presents at PT eval functioning below baseline and currently w/ overall mobility deficits 2* to: BLE weakness, impaired balance, decreased endurance, gait deviations, decreased activity tolerance compared to baseline, decreased functional mobility tolerance compared to baseline, decreased safety awareness, fall risk   Pt currently at a fall risk 2* to impairments listed above  Pt will continue to benefit from skilled acute PT interventions to address stated impairments; to maximize functional mobility; for ongoing pt/ family training; and DME needs  At conclusion of PT session pt returned back in chair and chair alarm engaged with phone and call bell within reach  Pt denies any further questions at this time  Recommend home with family care and HHPT upon hospital D/C, pt reports son will be staying with her upon discharge and able to assist         PT Discharge Recommendation: Home with skilled therapy(family care and HHPT)     PT - OK to Discharge: Yes    See flowsheet documentation for full assessment

## 2021-01-07 VITALS
OXYGEN SATURATION: 88 % | HEART RATE: 90 BPM | BODY MASS INDEX: 27.86 KG/M2 | DIASTOLIC BLOOD PRESSURE: 73 MMHG | TEMPERATURE: 97.9 F | SYSTOLIC BLOOD PRESSURE: 141 MMHG | HEIGHT: 60 IN | RESPIRATION RATE: 20 BRPM

## 2021-01-07 LAB
ANION GAP SERPL CALCULATED.3IONS-SCNC: 4 MMOL/L (ref 4–13)
ATRIAL RATE: 111 BPM
BACTERIA SPEC ANAEROBE CULT: ABNORMAL
BACTERIA SPEC BFLD CULT: ABNORMAL
BASOPHILS # BLD AUTO: 0.07 THOUSANDS/ΜL (ref 0–0.1)
BASOPHILS NFR BLD AUTO: 1 % (ref 0–1)
BUN SERPL-MCNC: 6 MG/DL (ref 5–25)
CALCIUM SERPL-MCNC: 9.4 MG/DL (ref 8.3–10.1)
CHLORIDE SERPL-SCNC: 105 MMOL/L (ref 100–108)
CO2 SERPL-SCNC: 28 MMOL/L (ref 21–32)
CREAT SERPL-MCNC: 0.64 MG/DL (ref 0.6–1.3)
EOSINOPHIL # BLD AUTO: 0.23 THOUSAND/ΜL (ref 0–0.61)
EOSINOPHIL NFR BLD AUTO: 3 % (ref 0–6)
ERYTHROCYTE [DISTWIDTH] IN BLOOD BY AUTOMATED COUNT: 14.3 % (ref 11.6–15.1)
GFR SERPL CREATININE-BSD FRML MDRD: 85 ML/MIN/1.73SQ M
GLUCOSE SERPL-MCNC: 179 MG/DL (ref 65–140)
GLUCOSE SERPL-MCNC: 183 MG/DL (ref 65–140)
GLUCOSE SERPL-MCNC: 199 MG/DL (ref 65–140)
GRAM STN SPEC: ABNORMAL
GRAM STN SPEC: ABNORMAL
HCT VFR BLD AUTO: 39.3 % (ref 34.8–46.1)
HGB BLD-MCNC: 12.2 G/DL (ref 11.5–15.4)
IMM GRANULOCYTES # BLD AUTO: 0.19 THOUSAND/UL (ref 0–0.2)
IMM GRANULOCYTES NFR BLD AUTO: 2 % (ref 0–2)
LYMPHOCYTES # BLD AUTO: 1.58 THOUSANDS/ΜL (ref 0.6–4.47)
LYMPHOCYTES NFR BLD AUTO: 18 % (ref 14–44)
MCH RBC QN AUTO: 26.2 PG (ref 26.8–34.3)
MCHC RBC AUTO-ENTMCNC: 31 G/DL (ref 31.4–37.4)
MCV RBC AUTO: 85 FL (ref 82–98)
MONOCYTES # BLD AUTO: 0.83 THOUSAND/ΜL (ref 0.17–1.22)
MONOCYTES NFR BLD AUTO: 9 % (ref 4–12)
NEUTROPHILS # BLD AUTO: 6.12 THOUSANDS/ΜL (ref 1.85–7.62)
NEUTS SEG NFR BLD AUTO: 67 % (ref 43–75)
NRBC BLD AUTO-RTO: 0 /100 WBCS
P AXIS: 47 DEGREES
PLATELET # BLD AUTO: 409 THOUSANDS/UL (ref 149–390)
PMV BLD AUTO: 9.4 FL (ref 8.9–12.7)
POTASSIUM SERPL-SCNC: 4.2 MMOL/L (ref 3.5–5.3)
PR INTERVAL: 132 MS
QRS AXIS: 74 DEGREES
QRSD INTERVAL: 82 MS
QT INTERVAL: 294 MS
QTC INTERVAL: 399 MS
RBC # BLD AUTO: 4.65 MILLION/UL (ref 3.81–5.12)
SODIUM SERPL-SCNC: 137 MMOL/L (ref 136–145)
T WAVE AXIS: 111 DEGREES
VENTRICULAR RATE: 111 BPM
WBC # BLD AUTO: 9.02 THOUSAND/UL (ref 4.31–10.16)

## 2021-01-07 PROCEDURE — 85025 COMPLETE CBC W/AUTO DIFF WBC: CPT | Performed by: PHYSICIAN ASSISTANT

## 2021-01-07 PROCEDURE — 82948 REAGENT STRIP/BLOOD GLUCOSE: CPT

## 2021-01-07 PROCEDURE — 93010 ELECTROCARDIOGRAM REPORT: CPT | Performed by: INTERNAL MEDICINE

## 2021-01-07 PROCEDURE — 80048 BASIC METABOLIC PNL TOTAL CA: CPT | Performed by: PHYSICIAN ASSISTANT

## 2021-01-07 RX ORDER — SULFAMETHOXAZOLE AND TRIMETHOPRIM 800; 160 MG/1; MG/1
1 TABLET ORAL EVERY 12 HOURS SCHEDULED
Qty: 14 TABLET | Refills: 0 | Status: SHIPPED | OUTPATIENT
Start: 2021-01-07 | End: 2021-01-14

## 2021-01-07 RX ORDER — SODIUM CHLORIDE 9 MG/ML
10 INJECTION INTRAVENOUS DAILY
Qty: 30 SYRINGE | Refills: 0 | Status: SHIPPED | OUTPATIENT
Start: 2021-01-07 | End: 2021-10-21 | Stop reason: CLARIF

## 2021-01-07 RX ADMIN — SULFAMETHOXAZOLE AND TRIMETHOPRIM 1 TABLET: 800; 160 TABLET ORAL at 08:13

## 2021-01-07 RX ADMIN — ENOXAPARIN SODIUM 40 MG: 40 INJECTION SUBCUTANEOUS at 08:13

## 2021-01-07 RX ADMIN — METOPROLOL TARTRATE 50 MG: 50 TABLET, FILM COATED ORAL at 08:13

## 2021-01-07 RX ADMIN — ASPIRIN 81 MG: 81 TABLET ORAL at 08:13

## 2021-01-07 RX ADMIN — INSULIN LISPRO 1 UNITS: 100 INJECTION, SOLUTION INTRAVENOUS; SUBCUTANEOUS at 08:14

## 2021-01-07 RX ADMIN — AMLODIPINE BESYLATE 5 MG: 5 TABLET ORAL at 08:13

## 2021-01-07 RX ADMIN — TIOTROPIUM BROMIDE 18 MCG: 18 CAPSULE ORAL; RESPIRATORY (INHALATION) at 08:13

## 2021-01-07 NOTE — PLAN OF CARE
Problem: Potential for Falls  Goal: Patient will remain free of falls  Description: INTERVENTIONS:  - Assess patient frequently for physical needs  -  Identify cognitive and physical deficits and behaviors that affect risk of falls  -  [place] fall precautions as indicated by assessment   - Educate patient/family on patient safety including physical limitations  - Instruct patient to call for assistance with activity based on assessment  - Modify environment to reduce risk of injury  - Consider OT/PT consult to assist with strengthening/mobility  Outcome: Progressing     Problem: Prexisting or High Potential for Compromised Skin Integrity  Goal: Skin integrity is maintained or improved  Description: INTERVENTIONS:  - Identify patients at risk for skin breakdown  - Assess and monitor skin integrity  - Assess and monitor nutrition and hydration status  - Monitor labs   - Assess for incontinence   - Turn and reposition patient  - Assist with mobility/ambulation  - Relieve pressure over bony prominences  - Avoid friction and shearing  - Provide appropriate hygiene as needed including keeping skin clean and dry  - Evaluate need for skin moisturizer/barrier cream  - Collaborate with interdisciplinary team   - Patient/family teaching  - Consider wound care consult   Outcome: Progressing     Problem: Nutrition/Hydration-ADULT  Goal: Nutrient/Hydration intake appropriate for improving, restoring or maintaining nutritional needs  Description: Monitor and assess patient's nutrition/hydration status for malnutrition  Collaborate with interdisciplinary team and initiate plan and interventions as ordered  Monitor patient's weight and dietary intake as ordered or per policy  Utilize nutrition screening tool and intervene as necessary  Determine patient's food preferences and provide high-protein, high-caloric foods as appropriate       INTERVENTIONS:  - Monitor oral intake, urinary output, labs, and treatment plans  - Assess nutrition and hydration status and recommend course of action  - Evaluate amount of meals eaten  - Assist patient with eating if necessary   - Allow adequate time for meals  - Recommend/ encourage appropriate diets, oral nutritional supplements, and vitamin/mineral supplements  - Order, calculate, and assess calorie counts as needed  - Recommend, monitor, and adjust tube feedings and TPN/PPN based on assessed needs  - Assess need for intravenous fluids  - Provide specific nutrition/hydration education as appropriate  - Include patient/family/caregiver in decisions related to nutrition  Outcome: Progressing

## 2021-01-07 NOTE — NURSING NOTE
Discharge instruction covered with patient and son  No questions at this time son feels confident in flushing drain

## 2021-01-07 NOTE — DISCHARGE SUMMARY
Discharge Summary - Colorectal Surgery   Daja Venegas 79 y o  female MRN: [id number]  Unit/Bed#: [facility] 913-01 Encounter: [id number]    Admitting Diagnosis:     Admit Date:  Intra-abdominal abscess    Discharge Diagnosis:  Intra-abdominal abscess status post IR 8 Vietnamese drain placement    Discharge Date:  01/07/2021    HPI:  41-year-old female with a past medical history of diabetes, hypertension, COPD on 2 L home O2, history of colon cancer, status post left laparoscopic hemicolectomy on 12/18/2020 by Dr Omar Cardona presented to the ED with increased abdominal pain and lethargy for 1 day  Patient states she was feeling okay then developed sudden abdominal pain  She denied any fevers, chills, chest pain, shortness of breath, change in bowel or bladder habits  She is on chronic home O2 2 L daily  Patient's last bowel movement was 2 days ago which is normal for her, passing flatus  CT scan on admission with evidence of 9 x 6 x 9 cm pelvic fluid collection concerning for abscess  Procedures Performed:   01/04/2021 IR 10 Vietnamese drain placement    Hospital Course:  HPI as mentioned above, patient was admitted under the colorectal service of Dr Omar Cardona with an IR consult for drain placement into intra-abdominal abscess  She was treated conservatively with NPO, IV fluids, and IR drain placement  She underwent IR drain placement on 01/04/2021  On the evening of 01/04/2021 patient was a rapid response with an episode of syncope on ambulation to the bathroom  This was worked up by Internal Medicine as well as Cardiology and was believed to be vasovagal versus arrhythmia  Repeat labs were drawn at this time with troponin, EKG, and an echo  Telemetry was ordered for to monitor patient for further events  This time EKG demonstrated no presence of ST segment changes or T-wave changes  She remained on her baseline 2 L nasal cannula  Troponin was less than 0 2    Echo revealed new regional wall motion abnormalities compared to previous echo done in September of 2020  Per cardiology she was already currently on appropriate medication regimen for coronary artery disease including aspirin, statin, and beta-blocker therapy  Per Cardiology and slim, syncopal episode attributed to orthostatic hypotension  By hospital day 4 patient was tolerating diet without nausea or vomiting, no further episodes of syncope, out of bed and ambulating, passing flatus and having bowel movements, and was without abdominal pain  She was cleared from a Cardiology, Internal Medicine, and surgical standpoint  She was instructed to follow-up in the or [facility] office with Dr Cezar Tyler from cardiology  She is to follow-up with Dr Katharine Eaton in 1-2 weeks  She is to follow-up with Interventional Radiology for drain check in 1-2 weeks  She has to flush her IR drain with 10 cc normal saline daily  Scripts sent to pharmacy  She is to continue Bactrim for 7 more days until 01/14/2021, script sent to pharmacy  All discharge instructions as well as postoperative appointments were discussed with the patient, she is in agreement with plan  Lab Results: I have personally reviewed pertinent lab results  Complications: None    Condition at Discharge: good     Discharge instructions/Information to patient and family:   See after visit summary for information provided to patient and family  Provisions for Follow-Up Care:  See after visit summary for information related to follow-up care and any pertinent home health orders  Disposition: Home    Planned Readmission: No    Discharge Statement   I spent 35 minutes discharging the patient  This time was spent on the day of discharge  I had direct contact with the patient on the day of discharge  Additional documentation is required if more than 30 minutes were spent on discharge       Discharge Medications:  See after visit summary for reconciled discharge medications provided to patient and family        Eduardo Ocasio PA-C

## 2021-01-07 NOTE — DISCHARGE INSTRUCTIONS
TUBE CARE INSTRUCTIONS    Care after your procedure:    Resume your normal diet  Small sips of flat soda will help with nausea  1  The properly functioning catheter should be forward flushed once (1x) daily with 10ml of normal saline using clean technique  You will be given a prescription for flushes  To flush the tube, clean both connections with alcohol swab  Twist off the drainage bag/ bulb  tubing and twist the saline syringe into the drainage tube and flush  Remove the syringe and twist the drainage bag / bulb tubing tubing back on     2  The drainage bag/bulb may be emptied as necessary  Keep a record of the amount of fluid you drain from your tube  This should be done with clean technique as well  3  A fresh dressing should be applied daily over the tube insertion site  4  As the tube is secured to the skin with only a suture,try not to pull on your tube  Tub baths are not permitted  Showers are permitted if the patient's skin entry site is prevented from getting wet  Similarly, washcloth "baths" are acceptable  Contact Interventional Radiology at [phone] [place] PATIENTS: Contact Interventional Radiology at [phone]) [place] PATIENTS: Contact Interventional Radiology at [phone]) if:    1  Leakage or large amounts of liquid around the catheter  2  Fever of 101 degrees lasting several hours without other obvious cause (such as sore throat, flu, etc)  3  Persistent nausea or vomiting  4  Diminished drainage, which may be associated with pressure or pain  Or when the     drainage from your tube is less than 10mls for 48 hours  5  Catheter pulled back or falls out  The following pharmacies carry the flush syringes         [facility]  [address] [facility]                         [address] [facility]  Phone [phone]            Phone [phone]   [place] [address]                                [phone]  [address] [facility] [place]                      [address] [place]  Phone [phone]            Phone [phone]                      Eric Thomas                                                                                                          [phone]  [facility]  [facility] [address]  [address] [facility]  Phone [phone]    DISCHARGE INSTRUCTIONS    Follow Up: Follow up with Dr Hood Donis in 1-2 weeks  Follow up with IR for drain check,  flush once (1x) daily with 10ml of normal saline using clean technique  Take Bactrim as prescribed until 1/14/2021    Diet: You may resume a regular diet    Pain: Tylenol alternating with Ibuprofen for pain    Shower: You may shower over the wound, unless there are drain tubes left in place  Do not bathe or use a pool or hot tub until cleared by the physician  Activity: As tolerated  You may go up and down stairs, walk as much as you are comfortable, but walk at least 3 times each day  Do not lift anything heavier than 15 pounds for at least 2-4 weeks, unless cleared by the doctor  Medications: Resume all of your previous medications, unless told otherwise by the doctor  Avoid aspirin or ibuprofen (Advil, Motrin, etc ) products for 2-3 days after the date of surgery  You may, at that time, began to take them again  Tylenol is always fine  You do not need to take the narcotic pain medications unless you are having significant pain and discomfort      Call the office: If you are experiencing any of the following, fevers above 101 5° or chills, significant nausea or vomiting, increase in abdominal pain, if the wound develops drainage and/or is excessive redness around the wound, or if you have significant diarrhea or other worsening symptoms

## 2021-01-07 NOTE — DISCHARGE INSTR - AVS FIRST PAGE
DISCHARGE INSTRUCTIONS    Follow Up: Follow up with Dr Gage Villa in 1-2 weeks  Follow up with IR for drain check,  flush once (1x) daily with 10ml of normal saline using clean technique    Take Bactrim as prescribed until 1/14/2021

## 2021-01-07 NOTE — PROGRESS NOTES
Progress Note - Akin Lyons 79 y o  female MRN: [id number]    Unit/Bed#: [facility] 913-01 Encounter: [id number]      Assessment:  78 y/o F s/p Laparoscopic L hemicolectomy on 12/18, now p/w intra-abdominal fluid collection, s/p IR drain placement on 1/4    Afebrile, vss  abd soft, nontender, non distended  IR drain w 25 cc purulent output  Plan:  Continue diabetic diet  Ambulate  Continue lovenox dvt ppx  Discharge home today on bactrim till 1/14    Subjective:   Feels great  Denied pain  Tolerating diet and having formed bowel mvts  Denied fever, chills, chest pain, shortness of breath, nausea, vomiting, or abdominal pain this morning  Objective:     Vitals: Blood pressure 144/73, pulse 90, temperature 97 9 °F (36 6 °C), resp  rate 20, height 5' (1 524 m), SpO2 (!) 88 %  ,Body mass index is 27 86 kg/m²  Intake/Output Summary (Last 24 hours) at 1/7/2021 0626  Last data filed at 1/7/2021 0501  Gross per 24 hour   Intake 1713 5 ml   Output 683 ml   Net 1030 5 ml       Physical Exam  General: NAD  HEENT: NC/AT  MMM  Cv: RRR     Lungs: normal effort  Ab: Soft, NT/ND  Ex: no CCE  Neuro: AAOx3    Scheduled Meds:  Current Facility-Administered Medications   Medication Dose Route Frequency Provider Last Rate    acetaminophen  650 mg Oral Q6H PRN Caty Rowley PA-C      albuterol  2 puff Inhalation Q4H PRN Esperanza Anderson MD      amLODIPine  5 mg Oral Daily Rodney Wilkinson DO      aspirin  81 mg Oral Daily Sonya Cordova MD      enoxaparin  40 mg Subcutaneous Daily Pastora Levine DO      insulin lispro  1-5 Units Subcutaneous TID AC ELY Lucio-KENDY      insulin lispro  1-5 Units Subcutaneous HS Hong Conley PA-C      loperamide  2 mg Oral Q4H PRN Saray Burnham PA-C      metoprolol tartrate  50 mg Oral Q12H [address] Pastora Levine, DO      ondansetron  4 mg Intravenous Q4H PRN Sonya Cordova MD      oxyCODONE  2 5 mg Oral Q4H PRN Caty Rowley PA-C      pravastatin  20 mg Oral Daily With David American P DO Julianna      sulfamethoxazole-trimethoprim  1 tablet Oral Q12H [address] Jose Batres MD      tiotropium  18 mcg Inhalation Daily Tushar Noriega MD       Continuous Infusions:   PRN Meds:   acetaminophen    albuterol    loperamide    ondansetron    oxyCODONE      Invasive Devices     Peripheral Intravenous Line            Peripheral IV 01/06/21 Right Forearm less than 1 day          Drain            External Urinary Catheter 17 days    Closed/Suction Drain Right Back Bulb 10 2 Fr  2 days                Lab, Imaging and other studies: I have personally reviewed pertinent reports      VTE Pharmacologic Prophylaxis: Sequential compression device (Venodyne)   VTE Mechanical Prophylaxis: sequential compression device

## 2021-01-11 ENCOUNTER — TELEPHONE (OUTPATIENT)
Dept: INTERVENTIONAL RADIOLOGY/VASCULAR | Facility: HOSPITAL | Age: 81
End: 2021-01-11

## 2021-01-12 ENCOUNTER — TELEPHONE (OUTPATIENT)
Dept: INTERVENTIONAL RADIOLOGY/VASCULAR | Facility: HOSPITAL | Age: 81
End: 2021-01-12

## 2021-01-14 ENCOUNTER — HOSPITAL ENCOUNTER (OUTPATIENT)
Dept: INTERVENTIONAL RADIOLOGY/VASCULAR | Facility: HOSPITAL | Age: 81
Discharge: HOME | End: 2021-01-14
Attending: RADIOLOGY
Payer: MEDICARE

## 2021-01-14 ENCOUNTER — TELEPHONE (OUTPATIENT)
Dept: INTERVENTIONAL RADIOLOGY/VASCULAR | Facility: HOSPITAL | Age: 81
End: 2021-01-14

## 2021-01-14 DIAGNOSIS — T81.43XA ABSCESS, INTRA-ABDOMINAL, POSTOPERATIVE: ICD-10-CM

## 2021-01-14 PROCEDURE — 76080 X-RAY EXAM OF FISTULA: CPT

## 2021-01-14 PROCEDURE — 76080 X-RAY EXAM OF FISTULA: CPT | Performed by: STUDENT IN AN ORGANIZED HEALTH CARE EDUCATION/TRAINING PROGRAM

## 2021-01-14 PROCEDURE — 49424 ASSESS CYST CONTRAST INJECT: CPT | Performed by: STUDENT IN AN ORGANIZED HEALTH CARE EDUCATION/TRAINING PROGRAM

## 2021-01-14 PROCEDURE — 49424 ASSESS CYST CONTRAST INJECT: CPT

## 2021-01-14 RX ADMIN — IOHEXOL 4 ML: 350 INJECTION, SOLUTION INTRAVENOUS at 14:44

## 2021-01-14 NOTE — SEDATION DOCUMENTATION
Tube check images taken and reviewed by MD  AS per MD jeffries to removed catheter  Pt aware and verbalized understanding

## 2021-01-14 NOTE — PROGRESS NOTES
Interventional Radiology Preprocedure Note    History/Indication for procedure:   Lindsay Leigh is a 79 y o  female with a PMH of hemicolectomy c/b fluid collection s/p TG drain insertion 2 weeks ago  Output has been <10 ml/day  No f/c/pericatheter leakage  There were no vitals taken for this visit  Relevant past medical history:    Past Medical History:   Diagnosis Date    [place] [address] )     COPD (chronic obstructive pulmonary disease) ([place] [address] )     Diabetes mellitus ([place] [address] )     Hypertension     On home oxygen therapy     2L @ hs & with exertional activity      Patient Active Problem List   Diagnosis    Essential hypertension    Diabetes mellitus type 2    COPD    Oxygen dependent    GI malignancy ([place] [address] )    CAD (coronary artery disease)    Hypothyroidism    Hyperlipidemia    Postoperative intra-abdominal abscess    Syncope    Adnexal cyst    Nephrolithiasis    Adenocarcinoma of colon    Hypophosphatemia       Medications:    Inpatient Medications:     Scheduled Medications:      Infusions:  No current facility-administered medications for this encounter         PRN:      Outpatient Medications:  Current Outpatient Medications on File Prior to Encounter   Medication Sig Dispense Refill    amLODIPine (NORVASC) 5 mg tablet Take 5 mg by mouth daily      aspirin 81 mg chewable tablet Chew 81 mg daily      Calcium Carb-Cholecalciferol (CALCIUM 600/VITAMIN D3 PO) Take by mouth      co-enzyme Q-10 30 MG capsule Take 30 mg by mouth 3 (three) times a day      glimepiride (AMARYL) 1 mg tablet Take 1 mg by mouth daily before breakfast      glimepiride (AMARYL) 1 mg tablet Take 1 mg by mouth daily before dinner      levothyroxine 100 mcg tablet Take 112 mcg by mouth daily      lisinopril (ZESTRIL) 40 mg tablet Take 40 mg by mouth every evening      metFORMIN (GLUCOPHAGE) 500 mg tablet Take 500 mg by mouth 2 (two) times a day with meals      metoprolol tartrate (LOPRESSOR) 50 mg tablet Take 50 mg by mouth every 12 (twelve) hours Takes 1 tab in am, 1/2 tab in pm      simvastatin (ZOCOR) 10 mg tablet Take 10 mg by mouth daily at bedtime      sodium chloride, PF, 0 9 % 10 mL by Intracatheter route daily for 120 doses Intracatheter flushing daily 30 Syringe 0    sulfamethoxazole-trimethoprim (BACTRIM DS) 800-160 mg per tablet Take 1 tablet by mouth every 12 (twelve) hours for 7 days 14 tablet 0    tiotropium-olodaterol (Stiolto Respimat) 2 5-2 5 MCG/ACT inhaler Inhale 2 puffs daily       No current facility-administered medications on file prior to encounter  Allergies   Allergen Reactions    Demerol [Meperidine] Other (See Comments)     'eyes enlarged'        Anticoagulants: none    Labs:   CBC with diff:   Lab Results   Component Value Date    WBC 9 02 01/07/2021    HGB 12 2 01/07/2021    HCT 39 3 01/07/2021    MCV 85 01/07/2021     (H) 01/07/2021    MCH 26 2 (L) 01/07/2021    MCHC 31 0 (L) 01/07/2021    RDW 14 3 01/07/2021    MPV 9 4 01/07/2021    NRBC 0 01/07/2021     BMP/CMP:  Lab Results   Component Value Date    K 4 2 01/07/2021     01/07/2021    CO2 28 01/07/2021    BUN 6 01/07/2021    CREATININE 0 64 01/07/2021    CALCIUM 9 4 01/07/2021    AST 29 01/02/2021    ALT 18 01/02/2021    ALKPHOS 87 01/02/2021    EGFR 85 01/07/2021   ,     Coags:   Lab Results   Component Value Date    PTT 32 12/18/2020    INR 0 96 12/18/2020   ,          Relevant imaging studies:   Reviewed  Directed physical examination:  I agree with the physical exam performed on 1/7/21 and there are no additional changes  Assessment/Plan: For tube check  Sedation/Anesthesia plan:  Local sedation will be used as needed for procedure  Consent with alternatives to the procedure, risks and benefits have been explained and discussed with the patient/patient's family: continuation of care

## 2021-01-14 NOTE — BRIEF OP NOTE (RAD/CATH)
INTERVENTIONAL RADIOLOGY PROCEDURE NOTE    Date: 1/14/2021    Procedure: IR DRAINAGE TUBE CHECK/CHANGE/REPOSITION/REINSERTION/UPSIZE    Preoperative diagnosis:   1  Abscess, intra-abdominal, postoperative         Postoperative diagnosis: Same  Surgeon: Valdemar Cherry MD     Assistant: None  No qualified resident was available  Blood loss: 0 ml    Specimens: none     Findings: abscessogram showed minimal residual collection  Catheter removed  Complications: None immediate      Anesthesia: local

## 2021-02-09 DIAGNOSIS — Z23 ENCOUNTER FOR IMMUNIZATION: ICD-10-CM

## 2021-02-20 ENCOUNTER — IMMUNIZATIONS (OUTPATIENT)
Dept: FAMILY MEDICINE CLINIC | Facility: HOSPITAL | Age: 81
End: 2021-02-20

## 2021-02-20 DIAGNOSIS — Z23 ENCOUNTER FOR IMMUNIZATION: Primary | ICD-10-CM

## 2021-02-20 PROCEDURE — 91300 SARS-COV-2 / COVID-19 MRNA VACCINE (PFIZER-BIONTECH) 30 MCG: CPT

## 2021-02-20 PROCEDURE — 0001A SARS-COV-2 / COVID-19 MRNA VACCINE (PFIZER-BIONTECH) 30 MCG: CPT

## 2021-03-08 ENCOUNTER — CONSULT (OUTPATIENT)
Dept: CARDIOLOGY CLINIC | Facility: CLINIC | Age: 81
End: 2021-03-08
Payer: MEDICARE

## 2021-03-08 VITALS
HEIGHT: 60 IN | BODY MASS INDEX: 24.19 KG/M2 | DIASTOLIC BLOOD PRESSURE: 64 MMHG | OXYGEN SATURATION: 98 % | WEIGHT: 123.2 LBS | HEART RATE: 74 BPM | SYSTOLIC BLOOD PRESSURE: 134 MMHG | TEMPERATURE: 98 F

## 2021-03-08 DIAGNOSIS — R60.0 BILATERAL LOWER EXTREMITY EDEMA: ICD-10-CM

## 2021-03-08 DIAGNOSIS — I25.10 CORONARY ARTERY DISEASE INVOLVING NATIVE CORONARY ARTERY OF NATIVE HEART WITHOUT ANGINA PECTORIS: Primary | ICD-10-CM

## 2021-03-08 DIAGNOSIS — R55 VASOVAGAL SYNCOPE: ICD-10-CM

## 2021-03-08 DIAGNOSIS — E78.2 MIXED HYPERLIPIDEMIA: ICD-10-CM

## 2021-03-08 DIAGNOSIS — I10 ESSENTIAL HYPERTENSION: ICD-10-CM

## 2021-03-08 PROCEDURE — 1124F ACP DISCUSS-NO DSCNMKR DOCD: CPT | Performed by: INTERNAL MEDICINE

## 2021-03-08 PROCEDURE — 99214 OFFICE O/P EST MOD 30 MIN: CPT | Performed by: INTERNAL MEDICINE

## 2021-03-08 NOTE — PATIENT INSTRUCTIONS
Will monitor for recurrent symptoms  Call me with any change     Ideally would recommend updated nuclear stress test    Gee me if you change your mind about stress test

## 2021-03-08 NOTE — PROGRESS NOTES
Cardiology Office Visit    Yeni Armstrong  [id number]  1940    [facility]  [address] [place] [id number] [phone]      Dear Marcia Koch MD,    I had the pleasure of seeing your patient at our [address] [facility] office today 3/8/2021  As you know she is a pleasant 79y o  year old female with a medical history as described below  Reason for office visit:   Establish care for coronary artery disease, hypertension, hyperlipidemia and chest pain  1  Coronary artery disease involving native coronary artery of native heart without angina pectoris  Assessment & Plan:  Patient with known coronary artery disease  Remote stenting 25+ years ago  No recent cardiology follow-up  Patient has deferred stress testing due to severe shortness of breath/dyspnea on exertion  Patient does have ongoing episodes of chest discomfort which have been present for some time and have remained unchanged  She describes localized dull left-sided chest pain not necessarily associated with exertion and possibly related to chest tightness due to difficulty breathing  We discussed this today and I recommended that ideally she should undergo some type of ischemic workup given regional wall motion abnormality noted on echocardiogram   She does not wish to proceed at this time but will consider  For now I would recommend continuing medical therapy with aspirin 81 mg daily, simvastatin 10 mg daily, metoprolol tartrate 50 mg twice daily, lisinopril 40 mg daily and amlodipine 5 mg daily  Patient will continue to monitor symptoms and consider stress testing and call me if she changes her mind about proceeding with additional testing  HPI   Patient was admitted to [facility] 01/03/2021 and discharged 01/07/2021  Patient had undergone left laparoscopic hemicolectomy on 12/18/2020    She presented to the emergency department with increased abdominal pain and lethargy  CT scan showed evidence of a 9 x 6 x 9 cm pelvic fluid collection concerning for abscess  Patient underwent drain placement  She was treated conservatively with NPO status, IV fluids and IR drain placement  Patient had an episode of syncope that occurred 01/04/2021 while she was ambulating to the bathroom  Echocardiogram obtained showed new regional wall motion abnormality compared to her prior study of 9/2020  Cardiology was consulted and felt that her syncopal episode was secondary to orthostatic hypotension as she did have positive orthostatic vital signs  Ischemic evaluation was to be considered as an outpatient depending on the status/prognosis of her cancer  Patient was on appropriate medication for coronary artery disease in the form of aspirin, statin and beta-blocker  Patient has a history of CAD s/p remote stenting (25+ years ago), HTN, HLD, type 2 DM, COPD, chronic hypoxic respiratory failure on home oxygen therapy, and recently diagnosed colon cancer s/p left hemicolectomy on 12/18/20  Family history includes sister and father with CAD, one son with afib who suffered a cardiac arrest 10 years ago and ultimately passed away after being kept on life support for 5 days and another son with an arrhythmia that required what sounds like an ablation  The patient has not followed with Cardiology since that time her stent was placed  3/8/2021:   Patient presents to the office today to establish care  She states that in general she has been feeling well since the time of her discharge home  She does admit to occasional chest pain which is localized over the left chest and described as dull  This has not changed in frequency or intensity  As noted she does have a history of coronary artery disease status post remote stenting  She denies having prior MI  She has not routinely followed up with Cardiology since the time of her stenting  She does admit to 1 prior episode of syncope which happened in extreme heat many years ago  She has had no lightheadedness / dizziness or near-syncope since discharge from the hospital   She has refused stress tests due to severe shortness of breath  She has noted increased lower extremity edema over the last 2 years  She denies any orthopnea and states that she is able to lie flat to sleep  She does snore and underwent a prior sleep study but did not finish the study  She has no interest in re-attempt doing this  Patient does follow with Pulmonary Medicine for her COPD  Patient Active Problem List   Diagnosis    Essential hypertension    Diabetes mellitus type 2    COPD    Oxygen dependent    GI malignancy ([place] [address] )    CAD (coronary artery disease)    Hypothyroidism    Hyperlipidemia    Postoperative intra-abdominal abscess    Syncope    Adnexal cyst    Nephrolithiasis    Adenocarcinoma of colon    Hypophosphatemia    Bilateral lower extremity edema     Past Medical History:   Diagnosis Date    Cancer [facility])     Cerebrovascular disease     Chest pain     COPD (chronic obstructive pulmonary disease) ([place] [address] )     Coronary artery disease     Remote stenting    Diabetes mellitus ([place] [address] )     Hyperlipidemia     Hypertension     Hypothyroidism     On home oxygen therapy     2L @ hs & with exertional activity      Social History     Socioeconomic History    Marital status:       Spouse name: Not on file    Number of children: Not on file    Years of education: Not on file    Highest education level: Not on file   Occupational History    Not on file   Social Needs    Financial resource strain: Not on file    Food insecurity     Worry: Not on file     Inability: Not on file    Transportation needs     Medical: Not on file     Non-medical: Not on file   Tobacco Use    Smoking status: Former Smoker     Types: Cigarettes    Smokeless tobacco: Never Used    Tobacco comment: quit 2000    Substance and Sexual Activity    Alcohol use: Not Currently    Drug use: Never    Sexual activity: Not Currently   Lifestyle    Physical activity     Days per week: Not on file     Minutes per session: Not on file    Stress: Not on file   Relationships    Social connections     Talks on phone: Not on file     Gets together: Not on file     Attends Taoist service: Not on file     Active member of club or organization: Not on file     Attends meetings of clubs or organizations: Not on file     Relationship status: Not on file    Intimate partner violence     Fear of current or ex partner: Not on file     Emotionally abused: Not on file     Physically abused: Not on file     Forced sexual activity: Not on file   Other Topics Concern    Not on file   Social History Narrative    Not on file      Family History   Problem Relation Age of Onset    Cancer Mother     Diabetes Father     Hypertension Father     Heart attack Father     Diabetes Sister     Cancer Sister     Valvular heart disease Sister         AVR    Heart disease Brother         CABG     Stroke Maternal Grandmother      Past Surgical History:   Procedure Laterality Date    CATARACT EXTRACTION      CORONARY ANGIOPLASTY WITH STENT PLACEMENT      placed [id number]    CT GUIDED PERC DRAINAGE CATHETER PLACEMENT  1/4/2021    EXTRACORPOREAL SHOCK WAVE LITHOTRIPSY      HEMICOLOECTOMY W/ ANASTOMOSIS Left 12/18/2020    Procedure: LAPAROSCOPIC HEMICOLECTOMY, TAKE DOWN OF SPLENIC FLEXURE, COLORECTAL ANASTAMOSIS;  Surgeon: Nasrin Long MD;  Location: [facility];  Service: Colorectal    HYSTERECTOMY      KIDNEY SURGERY      'growth removed'     NERVE SURGERY      R arm     TONSILLECTOMY      TUBAL LIGATION         Current Outpatient Medications:     amLODIPine (NORVASC) 5 mg tablet, Take 5 mg by mouth daily, Disp: , Rfl:     ASPIRIN 81 PO, Take 81 mg by mouth, Disp: , Rfl:     Calcium Carb-Cholecalciferol (CALCIUM 600/VITAMIN D3 PO), Take by mouth, Disp: , Rfl:     co-enzyme Q-10 30 MG capsule, Take 30 mg by mouth 3 (three) times a day, Disp: , Rfl:     glimepiride (AMARYL) 1 mg tablet, Take 1 mg by mouth 2 (two) times daily after meals , Disp: , Rfl:     levothyroxine 100 mcg tablet, Take 112 mcg by mouth daily, Disp: , Rfl:     lisinopril (ZESTRIL) 40 mg tablet, Take 40 mg by mouth every evening, Disp: , Rfl:     metFORMIN (GLUCOPHAGE) 500 mg tablet, Take 500 mg by mouth 2 (two) times a day with meals, Disp: , Rfl:     metoprolol tartrate (LOPRESSOR) 50 mg tablet, Take 50 mg by mouth every 12 (twelve) hours Takes 1 tab in am, 1/2 tab in pm, Disp: , Rfl:     simvastatin (ZOCOR) 10 mg tablet, Take 10 mg by mouth daily at bedtime, Disp: , Rfl:     tiotropium-olodaterol (Stiolto Respimat) 2 5-2 5 MCG/ACT inhaler, Inhale 2 puffs daily, Disp: , Rfl:     glimepiride (AMARYL) 1 mg tablet, Take 1 mg by mouth daily before dinner, Disp: , Rfl:     sodium chloride, PF, 0 9 %, 10 mL by Intracatheter route daily for 120 doses Intracatheter flushing daily (Patient not taking: Reported on 3/8/2021), Disp: 30 Syringe, Rfl: 0  Allergies   Allergen Reactions    Demerol [Meperidine] Other (See Comments)     'eyes enlarged'          Cardiac Test Results:     Lipid panel 01/06/2021: C 121  T152  H 19  L 72  Echocardiogram 01/05/2021:  EF 60%  Severe hypokinesis of mid anteroseptal and mid inferoseptal wall  Mild diastolic dysfunction  Thickened atrial septum  Mild mitral regurgitation  Review of Systems:    Review of Systems   Constitutional: Positive for fatigue  Negative for activity change and appetite change  HENT: Negative for congestion, hearing loss, tinnitus and trouble swallowing  Eyes: Negative for visual disturbance  Respiratory: Positive for chest tightness and shortness of breath  Negative for cough and wheezing  Snores   Cardiovascular: Positive for chest pain and leg swelling  Negative for palpitations  Gastrointestinal: Negative for abdominal distention, abdominal pain, nausea and vomiting  Genitourinary: Negative for difficulty urinating  Musculoskeletal: Negative for arthralgias  Skin: Negative for rash  Neurological: Negative for dizziness, syncope and light-headedness  Hematological: Does not bruise/bleed easily  Psychiatric/Behavioral: Negative for confusion  The patient is not nervous/anxious  All other systems reviewed and are negative  Vitals:    03/08/21 1302 03/08/21 1327   BP: 142/70 134/64   BP Location:  Left arm   Patient Position:  Sitting   Pulse: 74    Temp: 98 °F (36 7 °C)    SpO2: 98%    Weight: 55 9 kg (123 lb 3 2 oz)    Height: 5' (1 524 m)      Vitals:    03/08/21 1302   Weight: 55 9 kg (123 lb 3 2 oz)     Height: 5' (152 4 cm)     Physical Exam   Constitutional: She is oriented to person, place, and time  She appears well-developed and well-nourished  HENT:   Head: Normocephalic and atraumatic  Eyes: Pupils are equal, round, and reactive to light  Conjunctivae are normal    Neck: Normal range of motion  No JVD present  Cardiovascular: Normal rate, regular rhythm and normal heart sounds  Exam reveals no gallop and no friction rub  No murmur heard  Pulmonary/Chest: Effort normal and breath sounds normal    Abdominal: Soft  Bowel sounds are normal    Musculoskeletal:         General: Edema present  Neurological: She is alert and oriented to person, place, and time  Skin: Skin is warm and dry  Psychiatric: She has a normal mood and affect   Her behavior is normal

## 2021-03-10 PROBLEM — R60.0 BILATERAL LOWER EXTREMITY EDEMA: Status: ACTIVE | Noted: 2021-03-10

## 2021-03-11 NOTE — ASSESSMENT & PLAN NOTE
Patient with known coronary artery disease  Remote stenting 25+ years ago  No recent cardiology follow-up  Patient has deferred stress testing due to severe shortness of breath/dyspnea on exertion  Patient does have ongoing episodes of chest discomfort which have been present for some time and have remained unchanged  She describes localized dull left-sided chest pain not necessarily associated with exertion and possibly related to chest tightness due to difficulty breathing  We discussed this today and I recommended that ideally she should undergo some type of ischemic workup given regional wall motion abnormality noted on echocardiogram   She does not wish to proceed at this time but will consider  For now I would recommend continuing medical therapy with aspirin 81 mg daily, simvastatin 10 mg daily, metoprolol tartrate 50 mg twice daily, lisinopril 40 mg daily and amlodipine 5 mg daily  Patient will continue to monitor symptoms and consider stress testing and call me if she changes her mind about proceeding with additional testing

## 2021-03-11 NOTE — ASSESSMENT & PLAN NOTE
Patient does have significant bilateral lower extremity edema  Echocardiogram  01/05/2021 does suggest mild diastolic dysfunction  NT proBNP was elevated during hospitalization  Patient may benefit from the addition of furosemide if she does have some component of diastolic dysfunction superimposed on her already significant underlying lung disease  Will plan repeat NT proBNP as well as discussion regarding diuretic therapy at follow-up

## 2021-03-11 NOTE — ASSESSMENT & PLAN NOTE
Lipid panel 01/06/2021: C 121  T152  H 19  L 72  Simvastatin 10 mg at bedtime  LDL goal less than 70

## 2021-03-11 NOTE — ASSESSMENT & PLAN NOTE
Syncope noted during hospitalization 1/2021 which occurred in the setting of orthostatic hypotension  Patient reports 1 prior episode of syncope which occurred many years ago in extreme heat  Patient will continue to monitor for lightheadedness/ dizziness or near syncope

## 2021-03-12 ENCOUNTER — IMMUNIZATIONS (OUTPATIENT)
Dept: FAMILY MEDICINE CLINIC | Facility: HOSPITAL | Age: 81
End: 2021-03-12

## 2021-03-12 DIAGNOSIS — Z23 ENCOUNTER FOR IMMUNIZATION: Primary | ICD-10-CM

## 2021-03-12 PROCEDURE — 91300 SARS-COV-2 / COVID-19 MRNA VACCINE (PFIZER-BIONTECH) 30 MCG: CPT

## 2021-03-12 PROCEDURE — 0002A SARS-COV-2 / COVID-19 MRNA VACCINE (PFIZER-BIONTECH) 30 MCG: CPT

## 2021-06-17 ENCOUNTER — OFFICE VISIT (OUTPATIENT)
Dept: CARDIOLOGY CLINIC | Facility: CLINIC | Age: 81
End: 2021-06-17
Payer: MEDICARE

## 2021-06-17 VITALS
WEIGHT: 129 LBS | SYSTOLIC BLOOD PRESSURE: 144 MMHG | BODY MASS INDEX: 25.32 KG/M2 | TEMPERATURE: 97 F | HEART RATE: 66 BPM | DIASTOLIC BLOOD PRESSURE: 72 MMHG | OXYGEN SATURATION: 96 % | HEIGHT: 60 IN

## 2021-06-17 DIAGNOSIS — I10 ESSENTIAL HYPERTENSION: ICD-10-CM

## 2021-06-17 DIAGNOSIS — E78.2 MIXED HYPERLIPIDEMIA: ICD-10-CM

## 2021-06-17 DIAGNOSIS — R00.2 PALPITATIONS: ICD-10-CM

## 2021-06-17 DIAGNOSIS — I25.10 CORONARY ARTERY DISEASE INVOLVING NATIVE CORONARY ARTERY OF NATIVE HEART WITHOUT ANGINA PECTORIS: Primary | ICD-10-CM

## 2021-06-17 DIAGNOSIS — J44.9 CHRONIC OBSTRUCTIVE PULMONARY DISEASE, UNSPECIFIED COPD TYPE (HCC): ICD-10-CM

## 2021-06-17 DIAGNOSIS — R55 VASOVAGAL SYNCOPE: ICD-10-CM

## 2021-06-17 DIAGNOSIS — R60.0 BILATERAL LOWER EXTREMITY EDEMA: ICD-10-CM

## 2021-06-17 DIAGNOSIS — E11.9 TYPE 2 DIABETES MELLITUS WITHOUT COMPLICATION, WITHOUT LONG-TERM CURRENT USE OF INSULIN (HCC): ICD-10-CM

## 2021-06-17 PROCEDURE — 99214 OFFICE O/P EST MOD 30 MIN: CPT | Performed by: INTERNAL MEDICINE

## 2021-07-19 ENCOUNTER — CLINICAL SUPPORT (OUTPATIENT)
Dept: CARDIOLOGY CLINIC | Facility: CLINIC | Age: 81
End: 2021-07-19
Payer: MEDICARE

## 2021-07-19 DIAGNOSIS — R55 VASOVAGAL SYNCOPE: ICD-10-CM

## 2021-07-19 DIAGNOSIS — R00.2 PALPITATIONS: ICD-10-CM

## 2021-07-19 PROCEDURE — 93248 EXT ECG>7D<15D REV&INTERPJ: CPT | Performed by: INTERNAL MEDICINE

## 2021-10-21 ENCOUNTER — OFFICE VISIT (OUTPATIENT)
Dept: CARDIOLOGY CLINIC | Facility: CLINIC | Age: 81
End: 2021-10-21
Payer: MEDICARE

## 2021-10-21 VITALS
HEART RATE: 64 BPM | WEIGHT: 134 LBS | HEIGHT: 60 IN | SYSTOLIC BLOOD PRESSURE: 136 MMHG | DIASTOLIC BLOOD PRESSURE: 70 MMHG | OXYGEN SATURATION: 99 % | BODY MASS INDEX: 26.31 KG/M2

## 2021-10-21 DIAGNOSIS — E78.2 MIXED HYPERLIPIDEMIA: ICD-10-CM

## 2021-10-21 DIAGNOSIS — J44.9 CHRONIC OBSTRUCTIVE PULMONARY DISEASE, UNSPECIFIED COPD TYPE (HCC): ICD-10-CM

## 2021-10-21 DIAGNOSIS — R60.0 BILATERAL LOWER EXTREMITY EDEMA: ICD-10-CM

## 2021-10-21 DIAGNOSIS — R55 VASOVAGAL SYNCOPE: ICD-10-CM

## 2021-10-21 DIAGNOSIS — R00.2 PALPITATIONS: ICD-10-CM

## 2021-10-21 DIAGNOSIS — I10 PRIMARY HYPERTENSION: ICD-10-CM

## 2021-10-21 DIAGNOSIS — E11.9 TYPE 2 DIABETES MELLITUS WITHOUT COMPLICATION, WITHOUT LONG-TERM CURRENT USE OF INSULIN (HCC): ICD-10-CM

## 2021-10-21 DIAGNOSIS — I25.10 CORONARY ARTERY DISEASE INVOLVING NATIVE CORONARY ARTERY OF NATIVE HEART WITHOUT ANGINA PECTORIS: Primary | ICD-10-CM

## 2021-10-21 PROCEDURE — 99214 OFFICE O/P EST MOD 30 MIN: CPT | Performed by: INTERNAL MEDICINE

## 2021-10-21 RX ORDER — IPRATROPIUM BROMIDE AND ALBUTEROL SULFATE 2.5; .5 MG/3ML; MG/3ML
3 SOLUTION RESPIRATORY (INHALATION) 4 TIMES DAILY
COMMUNITY
Start: 2021-06-23 | End: 2022-06-23

## 2021-10-21 RX ORDER — LAMOTRIGINE 100 MG/1
100 TABLET ORAL 2 TIMES DAILY
COMMUNITY
End: 2022-04-21

## 2022-04-21 ENCOUNTER — OFFICE VISIT (OUTPATIENT)
Dept: CARDIOLOGY CLINIC | Facility: CLINIC | Age: 82
End: 2022-04-21
Payer: MEDICARE

## 2022-04-21 VITALS
BODY MASS INDEX: 28.03 KG/M2 | SYSTOLIC BLOOD PRESSURE: 156 MMHG | HEART RATE: 67 BPM | TEMPERATURE: 96.5 F | HEIGHT: 60 IN | OXYGEN SATURATION: 96 % | RESPIRATION RATE: 20 BRPM | DIASTOLIC BLOOD PRESSURE: 74 MMHG | WEIGHT: 142.8 LBS

## 2022-04-21 DIAGNOSIS — R60.0 BILATERAL LOWER EXTREMITY EDEMA: ICD-10-CM

## 2022-04-21 DIAGNOSIS — I25.10 CORONARY ARTERY DISEASE INVOLVING NATIVE CORONARY ARTERY OF NATIVE HEART WITHOUT ANGINA PECTORIS: Primary | ICD-10-CM

## 2022-04-21 DIAGNOSIS — R55 VASOVAGAL SYNCOPE: ICD-10-CM

## 2022-04-21 DIAGNOSIS — I10 PRIMARY HYPERTENSION: ICD-10-CM

## 2022-04-21 DIAGNOSIS — J44.9 CHRONIC OBSTRUCTIVE PULMONARY DISEASE, UNSPECIFIED COPD TYPE (HCC): ICD-10-CM

## 2022-04-21 DIAGNOSIS — E11.9 TYPE 2 DIABETES MELLITUS WITHOUT COMPLICATION, WITHOUT LONG-TERM CURRENT USE OF INSULIN (HCC): ICD-10-CM

## 2022-04-21 DIAGNOSIS — E78.2 MIXED HYPERLIPIDEMIA: ICD-10-CM

## 2022-04-21 DIAGNOSIS — R00.2 PALPITATIONS: ICD-10-CM

## 2022-04-21 PROCEDURE — 99214 OFFICE O/P EST MOD 30 MIN: CPT | Performed by: INTERNAL MEDICINE

## 2022-04-21 PROCEDURE — 93000 ELECTROCARDIOGRAM COMPLETE: CPT | Performed by: INTERNAL MEDICINE

## 2022-04-21 RX ORDER — SIMVASTATIN 20 MG
TABLET ORAL
COMMUNITY
Start: 2022-01-31

## 2022-04-21 RX ORDER — METFORMIN HYDROCHLORIDE 500 MG/1
TABLET, EXTENDED RELEASE ORAL
COMMUNITY
Start: 2022-01-31 | End: 2022-04-21

## 2022-04-21 RX ORDER — LEVOTHYROXINE SODIUM 125 UG/1
TABLET ORAL
COMMUNITY
Start: 2022-03-07

## 2022-04-21 NOTE — ASSESSMENT & PLAN NOTE
Blood pressure is elevated on exam today  She is seeing PCP early next week  Continue current medications for now but if blood pressure is elevated at PCP office her amlodipine dose should be increased to 7 5 mg daily

## 2022-04-21 NOTE — ASSESSMENT & PLAN NOTE
Patient had previously noted occasional 'shaking' in her heart that seemed worse in am   7 day ZIO 6/17-6/24/2021 showed symptomatic PAC's and PVC's and 8 asymptomatic episodes of SVT with the longest lasting 15 1 seconds  Continue magnesium 250 mg daily

## 2022-04-21 NOTE — ASSESSMENT & PLAN NOTE
Lipid panel 10/11/2021: C 180  T156  H 52  L 97  Simvastatin 10 mg at bedtime  LDL goal less than 70  Will need up titration of simvastatin for more optimal lipid control  If next lipid panel shows LDL above goal, will increase simvastatin to 20 mg at bedtime

## 2022-04-21 NOTE — ASSESSMENT & PLAN NOTE
Patient had significant bilateral lower extremity edema at initial evaluation  Echocardiogram  01/05/2021 suggested mild diastolic dysfunction  NT proBNP was elevated during hospitalization  Patient appears euvolemic on exam today and at last office visit

## 2022-04-21 NOTE — ASSESSMENT & PLAN NOTE
Syncope noted during hospitalization 1/2021 which occurred in the setting of orthostatic hypotension  Patient reports 1 prior episode of syncope which occurred many years ago in extreme heat  Patient will continue to monitor for lightheadedness/ dizziness or near syncope  No recurrences since her hospitalization

## 2022-04-21 NOTE — PATIENT INSTRUCTIONS
ECG today shows some mild change compared to prior but similar to the one before that  I would recommend stress test once again  Lexiscan stress test not exercise  Blood pressure is elevated today and if it remains elevated I would increase amlodipine to 7 5 mg  Call me with any change in symptoms  Will wait for blood work from Dr Leo Kim (please have her send me a copy)

## 2022-04-21 NOTE — ASSESSMENT & PLAN NOTE
Patient with known coronary artery disease  Remote stenting 25+ years ago  Patient has deferred stress testing due to severe shortness of breath/dyspnea on exertion  We did re discuss this today and she continues to defer test    Patient does have ongoing episodes of chest discomfort which have been present for some time and have remained unchanged  She describes localized dull left-sided chest pain not necessarily associated with exertion and possibly related to chest tightness due to difficulty breathing  We discussed this previously and I recommended that ideally she should undergo some type of ischemic workup given regional wall motion abnormality noted on echocardiogram   She does not wish to proceed at this time but will consider (I have asked her son to try and help convince her)  I would recommend continuing medical therapy with aspirin 81 mg daily, simvastatin 10 mg daily, metoprolol tartrate 50 mg twice daily and amlodipine 5 mg daily  Patient will continue to monitor symptoms and consider stress testing and will let me know if she changes her mind about proceeding with additional testing

## 2022-11-01 ENCOUNTER — OFFICE VISIT (OUTPATIENT)
Dept: CARDIOLOGY CLINIC | Facility: CLINIC | Age: 82
End: 2022-11-01

## 2022-11-01 VITALS
BODY MASS INDEX: 27.68 KG/M2 | DIASTOLIC BLOOD PRESSURE: 70 MMHG | OXYGEN SATURATION: 95 % | HEART RATE: 60 BPM | WEIGHT: 141 LBS | SYSTOLIC BLOOD PRESSURE: 142 MMHG | HEIGHT: 60 IN

## 2022-11-01 DIAGNOSIS — J44.9 CHRONIC OBSTRUCTIVE PULMONARY DISEASE, UNSPECIFIED COPD TYPE (HCC): ICD-10-CM

## 2022-11-01 DIAGNOSIS — I25.10 CORONARY ARTERY DISEASE INVOLVING NATIVE CORONARY ARTERY OF NATIVE HEART WITHOUT ANGINA PECTORIS: Primary | ICD-10-CM

## 2022-11-01 DIAGNOSIS — E11.9 TYPE 2 DIABETES MELLITUS WITHOUT COMPLICATION, WITHOUT LONG-TERM CURRENT USE OF INSULIN (HCC): ICD-10-CM

## 2022-11-01 DIAGNOSIS — I10 PRIMARY HYPERTENSION: ICD-10-CM

## 2022-11-01 DIAGNOSIS — R00.2 PALPITATIONS: ICD-10-CM

## 2022-11-01 DIAGNOSIS — E78.2 MIXED HYPERLIPIDEMIA: ICD-10-CM

## 2022-11-01 DIAGNOSIS — R07.2 PRECORDIAL PAIN: ICD-10-CM

## 2022-11-01 DIAGNOSIS — R55 VASOVAGAL SYNCOPE: ICD-10-CM

## 2022-11-01 DIAGNOSIS — I25.118 ATHEROSCLEROTIC HEART DISEASE OF NATIVE CORONARY ARTERY WITH OTHER FORMS OF ANGINA PECTORIS (HCC): ICD-10-CM

## 2022-11-01 DIAGNOSIS — R60.0 BILATERAL LOWER EXTREMITY EDEMA: ICD-10-CM

## 2022-11-01 NOTE — PATIENT INSTRUCTIONS
Continue current medications  Will plan Lexiscan nuclear stress  I will call you with results  Call me with any change in symptoms

## 2022-11-01 NOTE — PROGRESS NOTES
Cardiology Office Visit    Ascencion Cabral  [id number]  1940    [facility]  [address]  [facility] [id number] [phone]      Dear Isra Molina DO,    I had the pleasure of seeing your patient at our [address] [facility] office today 11/1/2022  As you know she is a pleasant 81y o  year old female with a medical history as described below  Reason for office visit:  Follow up coronary artery disease, hypertension, hyperlipidemia and chest pain  1  Coronary artery disease involving native coronary artery of native heart without angina pectoris    2  Primary hypertension    3  Mixed hyperlipidemia    4  Vasovagal syncope    5  Bilateral lower extremity edema    6  Palpitations    7  Type 2 diabetes mellitus without complication, without long-term current use of insulin ([facility] )    8  Chronic obstructive pulmonary disease, unspecified COPD type ([facility] )       Plan:   Continue current medications  Lexiscan nuclear stress to rule out ischemia  I will call patient with results  Patient instructed to call me with any change in symptoms  HPI:  Patient was admitted to [facility] 01/03/2021 and discharged 01/07/2021  Patient had undergone left laparoscopic hemicolectomy on 12/18/2020  She presented to the emergency department with increased abdominal pain and lethargy  CT scan showed evidence of a 9 x 6 x 9 cm pelvic fluid collection concerning for abscess  Patient underwent drain placement  She was treated conservatively with NPO status, IV fluids and IR drain placement  Patient had an episode of syncope that occurred 01/04/2021 while she was ambulating to the bathroom  Echocardiogram obtained showed new regional wall motion abnormality compared to her prior study of 9/2020    Cardiology was consulted and felt that her syncopal episode was secondary to orthostatic hypotension as she did have positive orthostatic vital signs  Ischemic evaluation was to be considered as an outpatient depending on the status/prognosis of her cancer  Patient was on appropriate medication for coronary artery disease in the form of aspirin, statin and beta-blocker  Patient has a history of CAD s/p remote stenting (25+ years ago), HTN, HLD, type 2 DM, COPD, chronic hypoxic respiratory failure on home oxygen therapy, and recently diagnosed colon cancer s/p left hemicolectomy on 12/18/20  Family history includes sister and father with CAD, one son with afib who suffered a cardiac arrest 10 years ago and ultimately passed away after being kept on life support for 5 days and another son with an arrhythmia that required what sounds like an ablation  The patient has not followed with cardiology since the time her stent was placed  3/8/2021:   Patient presents to the office today to establish care  She states that in general she has been feeling well since the time of her discharge home  She does admit to occasional chest pain which is localized over the left chest and described as dull  This has not changed in frequency or intensity  As noted she does have a history of coronary artery disease status post remote stenting  She denies having prior MI  She has not routinely followed up with Cardiology since the time of her stenting  She does admit to 1 prior episode of syncope which happened in extreme heat many years ago  She has had no lightheadedness / dizziness or near-syncope since discharge from the hospital   She has refused stress tests due to severe shortness of breath  She has noted increased lower extremity edema over the last 2 years  She denies any orthopnea and states that she is able to lie flat to sleep  She does snore and underwent a prior sleep study but did not finish the study  She has no interest in re-attempt doing this    Patient does follow with Pulmonary Medicine for her COPD     6/17/2021: Patient presents to the office today for follow up  Patient continues to have stable episodes of chest discomfort  She reports that her breathing is stable  She was on antibiotics for 3 days as well as steroids  Plan is for repeat pulmonary function test at follow-up with Pulmonary  She denies any lightheadedness or dizziness  She describes an occasional “shaking” in her heart which seems to be worse in the morning  10/21/2021:  Patient returns to the office today for follow-up  She denies any chest pain  She denies any change in her breathing  She denies any lightheadedness or dizziness  She does note occasional lower extremity edema but currently denies any edema  No change in her oxygen requirements at home  She does tell me that she uses her oxygen 'most' of the time  Her levothyroxine was increased recently by PCP  Lisinopril was stopped at some point  She just told me 'don't ask"  ZIO results as outlined below  4/21/2022:  Patient returns to the office today for follow-up  She described episodes of chest heaviness whenever she tries to lift something  She continues to note shortness of breath and dyspnea on exertion due to her underlying COPD  She is on oxygen 24/7 and uses it 'most' of the time  She does note occasional “shaking” in her heart which she previously reported  This resolved quickly  It tends to happen when she is getting out of bed like in the middle of the night to go the bathroom  ECG 04/21/2022 shows normal sinus rhythm  Nonspecific anterior ST and T-wave abnormality  Her son is here with her  He has an ICD placed  She continues to defer updated stress testing  11/1/2022: Patient returns to the office today for follow up  She continues to have heaviness in her chest when she lifts things  She has not noticed the 'heart shaking' as much recently  She had severe chest pain after choking on a pork chop       Patient Active Problem List   Diagnosis   • Essential hypertension • Diabetes mellitus type 2   • COPD   • Oxygen dependent   • GI malignancy (HCC)   • CAD (coronary artery disease)   • Hypothyroidism   • Hyperlipidemia   • Postoperative intra-abdominal abscess   • Syncope   • Adnexal cyst   • Nephrolithiasis   • Adenocarcinoma of colon   • Hypophosphatemia   • Bilateral lower extremity edema   • Palpitations     Past Medical History:   Diagnosis Date   • Cancer [facility])    • Cerebrovascular disease    • Chest pain    • [facility]ch[facility] disease) [facility] )    • Coronary artery disease     Remote stenting   • Diabetes mellitus ([facility] )    • Hyperlipidemia    • Hypertension    • Hypothyroidism    • On home oxygen therapy     2L @ hs & with exertional activity      Social History     Socioeconomic History   • Marital status:       Spouse name: Not on file   • Number of children: Not on file   • Years of education: Not on file   • Highest education level: Not on file   Occupational History   • Not on file   Tobacco Use   • Smoking status: Former Smoker     Types: Cigarettes   • Smokeless tobacco: Never Used   • Tobacco comment: quit 2000    Vaping Use   • Vaping Use: Never used   Substance and Sexual Activity   • Alcohol use: Not Currently   • Drug use: Never   • Sexual activity: Not Currently   Other Topics Concern   • Not on file   Social History Narrative   • Not on file     Social Determinants of Health     Financial Resource Strain: Not on file   Food Insecurity: Not on file   Transportation Needs: Not on file   Physical Activity: Not on file   Stress: Not on file   Social Connections: Not on file   Intimate Partner Violence: Not on file   Housing Stability: Not on file      Family History   Problem Relation Age of Onset   • Cancer Mother    • Diabetes Father    • Hypertension Father    • Heart attack Father    • Diabetes Sister    • Cancer Sister    • Valvular heart disease Sister         AVR   • Heart disease Brother         CABG    • Stroke Maternal Grandmother Past Surgical History:   Procedure Laterality Date   • CATARACT EXTRACTION     • CORONARY ANGIOPLASTY WITH STENT PLACEMENT      placed [id number]   • CT GUIDED PERC DRAINAGE CATHETER PLACEMENT  1/4/2021   • EXTRACORPOREAL SHOCK WAVE LITHOTRIPSY     • HEMICOLOECTOMY W/ ANASTOMOSIS Left 12/18/2020    Procedure: LAPAROSCOPIC HEMICOLECTOMY, TAKE DOWN OF SPLENIC FLEXURE, COLORECTAL ANASTAMOSIS;  Surgeon: Keysha Barillas MD;  Location: [facility];  Service: Colorectal   • HYSTERECTOMY     • KIDNEY SURGERY      'growth removed'    • NERVE SURGERY      R arm    • TONSILLECTOMY     • TUBAL LIGATION         Current Outpatient Medications:   •  amLODIPine (NORVASC) 5 mg tablet, Take 5 mg by mouth daily, Disp: , Rfl:   •  ASPIRIN 81 PO, Take 81 mg by mouth, Disp: , Rfl:   •  Calcium Carb-Cholecalciferol (CALCIUM 600/VITAMIN D3 PO), Take by mouth, Disp: , Rfl:   •  co-enzyme Q-10 30 MG capsule, Take 30 mg by mouth 3 (three) times a day, Disp: , Rfl:   •  glimepiride (AMARYL) 1 mg tablet, Take 1 mg by mouth 2 (two) times daily after meals 1 tablet in am and 1/2 tablet at night, Disp: , Rfl:   •  Levothyroxine Sodium 125 MCG CAPS, Take 125 mcg by mouth daily , Disp: , Rfl:   •  metFORMIN (GLUCOPHAGE) 500 mg tablet, Take 500 mg by mouth 2 (two) times a day with meals, Disp: , Rfl:   •  metoprolol tartrate (LOPRESSOR) 50 mg tablet, Take 50 mg by mouth every 12 (twelve) hours Takes 1 tab in am, 1/2 tab in pm, Disp: , Rfl:   •  OXYGEN-HELIUM IN, Inhale 2 L/min, Disp: , Rfl:   •  simvastatin (ZOCOR) 20 mg tablet, , Disp: , Rfl:   •  tiotropium-olodaterol (Stiolto Respimat) 2 5-2 5 MCG/ACT inhaler, Inhale 2 puffs daily, Disp: , Rfl:   •  ipratropium-albuterol (COMBIVENT RESPIMAT) inhaler, Inhale 1 puff 4 (four) times a day (Patient not taking: Reported on 11/1/2022), Disp: , Rfl:   •  Synthroid 125 MCG tablet, , Disp: , Rfl:      Allergies   Allergen Reactions   • Demerol [Meperidine] Other (See Comments)     'eyes enlarged' Cardiac Test Results:     Lipid panel 4/28/2022:     ECG 4/21/2022:  Normal sinus rhythm  Nonspecific anterior ST T wave abnormality  Lipid panel 10/11/2021: C 180  T156  H 52  L 97  ZIO 6/17-6/24/2021:   Min HR 57  Max   Avg HR 73    8 SVT episodes with the longest lasting 15 1 seconds  Rare PAC's  Rare PVC's  Two triggered events both correlating with PAC's and PVC's  Lipid panel 01/06/2021: C 121  T152  H 19  L 72  Echocardiogram 01/05/2021:  EF 60%  Severe hypokinesis of mid anteroseptal and mid inferoseptal wall  Mild diastolic dysfunction  Thickened atrial septum  Mild mitral regurgitation  Review of Systems:    Review of Systems   Constitutional: Positive for fatigue  Negative for activity change and appetite change  HENT: Negative for congestion, hearing loss, tinnitus and trouble swallowing  Eyes: Negative for visual disturbance  Respiratory: Positive for chest tightness and shortness of breath (stable)  Negative for cough and wheezing  Snores   Cardiovascular: Positive for leg swelling  Negative for chest pain and palpitations  Gastrointestinal: Negative for abdominal distention, abdominal pain, nausea and vomiting  Genitourinary: Negative for difficulty urinating  Musculoskeletal: Negative for arthralgias  Skin: Negative for rash  Neurological: Negative for dizziness, syncope and light-headedness  Hematological: Does not bruise/bleed easily  Psychiatric/Behavioral: Negative for confusion  The patient is not nervous/anxious  All other systems reviewed and are negative  Vitals:    11/01/22 0947   BP: 162/80   Pulse: 60   SpO2: 95%   Weight: 64 kg (141 lb)   Height: 4' 11 5" (1 511 m)     Vitals:    11/01/22 0947   Weight: 64 kg (141 lb)     Height: 4' 11 5" (151 1 cm)     Physical Exam  Constitutional:       Appearance: She is well-developed  HENT:      Head: Normocephalic and atraumatic     Eyes:      Conjunctiva/sclera: Conjunctivae normal       Pupils: Pupils are equal, round, and reactive to light  Neck:      Vascular: No JVD  Cardiovascular:      Rate and Rhythm: Normal rate and regular rhythm  Heart sounds: Normal heart sounds  No murmur heard  No friction rub  No gallop  Pulmonary:      Effort: Pulmonary effort is normal       Breath sounds: Decreased breath sounds present  Abdominal:      General: Bowel sounds are normal       Palpations: Abdomen is soft  Musculoskeletal:      Cervical back: Normal range of motion  Skin:     General: Skin is warm and dry  Neurological:      Mental Status: She is alert and oriented to person, place, and time     Psychiatric:         Behavior: Behavior normal

## 2022-11-08 ENCOUNTER — HOSPITAL ENCOUNTER (OUTPATIENT)
Dept: NUCLEAR MEDICINE | Facility: HOSPITAL | Age: 82
Discharge: HOME | End: 2022-11-08
Attending: INTERNAL MEDICINE

## 2022-11-08 ENCOUNTER — HOSPITAL ENCOUNTER (OUTPATIENT)
Dept: NON INVASIVE DIAGNOSTICS | Facility: HOSPITAL | Age: 82
Discharge: HOME | End: 2022-11-08
Attending: INTERNAL MEDICINE

## 2022-11-08 DIAGNOSIS — I25.118 ATHEROSCLEROTIC HEART DISEASE OF NATIVE CORONARY ARTERY WITH OTHER FORMS OF ANGINA PECTORIS (HCC): ICD-10-CM

## 2022-11-08 DIAGNOSIS — I25.10 CORONARY ARTERY DISEASE INVOLVING NATIVE CORONARY ARTERY OF NATIVE HEART WITHOUT ANGINA PECTORIS: ICD-10-CM

## 2022-11-08 RX ORDER — NITROGLYCERIN 0.4 MG/1
0.4 TABLET SUBLINGUAL
COMMUNITY

## 2022-11-08 RX ORDER — METOPROLOL SUCCINATE 50 MG/1
50 TABLET, EXTENDED RELEASE ORAL 2 TIMES DAILY
COMMUNITY

## 2022-11-08 RX ORDER — LISINOPRIL 40 MG/1
40 TABLET ORAL DAILY
COMMUNITY

## 2022-11-08 RX ADMIN — REGADENOSON 0.4 MG: 0.08 INJECTION, SOLUTION INTRAVENOUS at 10:45

## 2022-11-09 LAB
ARRHY DURING EX: NORMAL
CHEST PAIN STATEMENT: NORMAL
MAX DIASTOLIC BP: 93 MMHG
MAX HEART RATE: 90 BPM
MAX PREDICTED HEART RATE: 139 BPM
MAX. SYSTOLIC BP: 187 MMHG
PROTOCOL NAME: NORMAL
REASON FOR TERMINATION: NORMAL
TARGET HR FORMULA: NORMAL
TEST INDICATION: NORMAL
TIME IN EXERCISE PHASE: NORMAL

## 2022-11-11 LAB
MAX HR: 90 BPM
NUC STRESS EJECTION FRACTION: 77 %
RATE PRESSURE PRODUCT: NORMAL
SL CV REST NUCLEAR ISOTOPE DOSE: 10.6 MCI
SL CV STRESS NUCLEAR ISOTOPE DOSE: 29.9 MCI
SL CV STRESS RECOVERY BP: NORMAL MMHG
SL CV STRESS RECOVERY HR: 75 BPM
SL CV STRESS RECOVERY O2 SAT: 98 %
STRESS ANGINA INDEX: 0
STRESS BASELINE BP: NORMAL MMHG
STRESS BASELINE HR: 60 BPM
STRESS DUKE TREADMILL SCORE: 3
STRESS O2 SAT REST: 98 %
STRESS PEAK HR: 90 BPM
STRESS POST EXERCISE DUR MIN: 3 MIN
STRESS POST EXERCISE DUR SEC: 0 SEC
STRESS POST O2 SAT PEAK: 98 %
STRESS POST PEAK BP: 170 MMHG
STRESS ST DEPRESSION: 0 MM
STRESS/REST PERFUSION RATIO: 1.12

## 2022-11-18 ENCOUNTER — TELEPHONE (OUTPATIENT)
Dept: CARDIOLOGY CLINIC | Facility: CLINIC | Age: 82
End: 2022-11-18

## 2022-11-18 NOTE — TELEPHONE ENCOUNTER
----- Message from Bobby Yan sent at 11/18/2022  1:27 PM EST -----  Please let patient know that her stress test did not show evidence of a blockage  Thank you!

## 2023-05-02 ENCOUNTER — OFFICE VISIT (OUTPATIENT)
Dept: CARDIOLOGY CLINIC | Facility: CLINIC | Age: 83
End: 2023-05-02
Payer: MEDICARE

## 2023-05-02 VITALS
BODY MASS INDEX: 27.13 KG/M2 | SYSTOLIC BLOOD PRESSURE: 136 MMHG | HEART RATE: 89 BPM | DIASTOLIC BLOOD PRESSURE: 82 MMHG | OXYGEN SATURATION: 97 % | WEIGHT: 138.2 LBS | HEIGHT: 60 IN

## 2023-05-02 DIAGNOSIS — J44.9 CHRONIC OBSTRUCTIVE PULMONARY DISEASE, UNSPECIFIED COPD TYPE (HCC): ICD-10-CM

## 2023-05-02 DIAGNOSIS — R55 VASOVAGAL SYNCOPE: ICD-10-CM

## 2023-05-02 DIAGNOSIS — I10 PRIMARY HYPERTENSION: ICD-10-CM

## 2023-05-02 DIAGNOSIS — R00.2 PALPITATIONS: ICD-10-CM

## 2023-05-02 DIAGNOSIS — I25.10 CORONARY ARTERY DISEASE INVOLVING NATIVE CORONARY ARTERY OF NATIVE HEART WITHOUT ANGINA PECTORIS: Primary | ICD-10-CM

## 2023-05-02 DIAGNOSIS — R60.0 BILATERAL LOWER EXTREMITY EDEMA: ICD-10-CM

## 2023-05-02 DIAGNOSIS — E11.9 TYPE 2 DIABETES MELLITUS WITHOUT COMPLICATION, WITHOUT LONG-TERM CURRENT USE OF INSULIN (HCC): ICD-10-CM

## 2023-05-02 DIAGNOSIS — E78.2 MIXED HYPERLIPIDEMIA: ICD-10-CM

## 2023-05-02 PROCEDURE — 99214 OFFICE O/P EST MOD 30 MIN: CPT | Performed by: INTERNAL MEDICINE

## 2023-05-02 NOTE — PATIENT INSTRUCTIONS
Continue current medications. Up to date with cardiac testing. Stress test showed no evidence of any blockages in the heart. Call me with any change in symptoms. Good luck with your test next week. Please have them send me a copy of the results.

## 2023-05-02 NOTE — PROGRESS NOTES
Cardiology Office Visit    Luh Simmons  [id number]  1940    [facility]  [address] [facility] [id number] [phone]      Dear Krista Bence, DO,    I had the pleasure of seeing your patient at our [facility] [address] office today 5/2/2023. As you know she is a pleasant 81y.o. year old female with a medical history as described below. Reason for office visit:  Follow up coronary artery disease, hypertension, hyperlipidemia and chest pain. 1. Coronary artery disease involving native coronary artery of native heart without angina pectoris    2. Primary hypertension    3. Mixed hyperlipidemia    4. Vasovagal syncope    5. Bilateral lower extremity edema    6. Palpitations    7. Type 2 diabetes mellitus without complication, without long-term current use of insulin ([address])    8. Chronic obstructive pulmonary disease, unspecified COPD type ([address])         Plan:   Continue current medications. Up to date with cardiac testing. Stress test showed no evidence of ischemia. Recurrent episode of choking. Barium swallow ordered by PCP to evaluate swallowing. Julia Carson also tells me that she was found to have enlarged lymph nodes. I have asked her to call me with any change in symptoms. I have asked her to please have them send me a copy of the results up upcoming testing. Will arrange follow up in the office.     ________________________________        HPI:  Patient was admitted to [address] [place] [address] 01/03/2021 and discharged 01/07/2021. Patient had undergone left laparoscopic hemicolectomy on 12/18/2020. She presented to the emergency department with increased abdominal pain and lethargy. CT scan showed evidence of a 9 x 6 x 9 cm pelvic fluid collection concerning for abscess. Patient underwent drain placement. She was treated conservatively with NPO status, IV fluids and IR drain placement.   Patient had an episode of syncope that occurred 01/04/2021 while she was ambulating to the bathroom. Echocardiogram obtained showed new regional wall motion abnormality compared to her prior study of 9/2020. Cardiology was consulted and felt that her syncopal episode was secondary to orthostatic hypotension as she did have positive orthostatic vital signs. Ischemic evaluation was to be considered as an outpatient depending on the status/prognosis of her cancer. Patient was on appropriate medication for coronary artery disease in the form of aspirin, statin and beta-blocker. Patient has a history of CAD s/p remote stenting (25+ years ago), HTN, HLD, type 2 DM, COPD, chronic hypoxic respiratory failure on home oxygen therapy, and recently diagnosed colon cancer s/p left hemicolectomy on 12/18/20. Family history includes sister and father with CAD, one son with afib who suffered a cardiac arrest 10 years ago and ultimately passed away after being kept on life support for 5 days and another son with an arrhythmia that required what sounds like an ablation. The patient has not followed with cardiology since the time her stent was placed. 3/8/2021:   Patient presents to the office today to establish care. She states that in general she has been feeling well since the time of her discharge home. She does admit to occasional chest pain which is localized over the left chest and described as dull. This has not changed in frequency or intensity. As noted she does have a history of coronary artery disease status post remote stenting. She denies having prior MI. She has not routinely followed up with Cardiology since the time of her stenting. She does admit to 1 prior episode of syncope which happened in extreme heat many years ago. She has had no lightheadedness / dizziness or near-syncope since discharge from the hospital.  She has refused stress tests due to severe shortness of breath.   She has noted increased lower extremity edema over the last 2 years. She denies any orthopnea and states that she is able to lie flat to sleep. She does snore and underwent a prior sleep study but did not finish the study. She has no interest in re-attempt doing this. Patient does follow with Pulmonary Medicine for her COPD.    6/17/2021: Patient presents to the office today for follow up. Patient continues to have stable episodes of chest discomfort. She reports that her breathing is stable. She was on antibiotics for 3 days as well as steroids. Plan is for repeat pulmonary function test at follow-up with Pulmonary. She denies any lightheadedness or dizziness. She describes an occasional “shaking” in her heart which seems to be worse in the morning. 10/21/2021:  Patient returns to the office today for follow-up. She denies any chest pain. She denies any change in her breathing. She denies any lightheadedness or dizziness. She does note occasional lower extremity edema but currently denies any edema. No change in her oxygen requirements at home. She does tell me that she uses her oxygen 'most' of the time. Her levothyroxine was increased recently by PCP. Lisinopril was stopped at some point. She just told me 'don't ask". ZIO results as outlined below. 4/21/2022:  Patient returns to the office today for follow-up. She described episodes of chest heaviness whenever she tries to lift something. She continues to note shortness of breath and dyspnea on exertion due to her underlying COPD. She is on oxygen 24/7 and uses it 'most' of the time. She does note occasional “shaking” in her heart which she previously reported. This resolved quickly. It tends to happen when she is getting out of bed like in the middle of the night to go the bathroom. ECG 04/21/2022 shows normal sinus rhythm. Nonspecific anterior ST and T-wave abnormality. Her son is here with her. He has an ICD placed.  She continues to defer updated stress testing. 11/1/2022: Patient returns to the office today for follow up. She continues to have heaviness in her chest when she lifts things. She has not noticed the 'heart shaking' as much recently. She had severe chest pain after choking on a pork chop. *Lexiscan nuclear stress test ordered at 11/1/2022 office visit due to continued chest heaviness. Allegra Coop nuclear stress test 11/8/2022 showed no evidence of myocardial ischemia. EF 77%. 5/2/2023Rebekah Trinidad presents to the office today for follow up. Lymph nodes enlarged. Continues to have palpitations and chest discomfort with lifting. She also had another episode of choking. Blood sugar has been high. Admits to making and eating a lot of candy. Lisinopril is on her medication list again but she tells me she is not taking it. Barium swallow is scheduled for further evaluation of choking. Patient Active Problem List   Diagnosis    Essential hypertension    Diabetes mellitus type 2    COPD    Oxygen dependent    GI malignancy (HCC)    CAD (coronary artery disease)    Hypothyroidism    Hyperlipidemia    Postoperative intra-abdominal abscess    Syncope    Adnexal cyst    Nephrolithiasis    Adenocarcinoma of colon    Hypophosphatemia    Bilateral lower extremity edema    Palpitations     Past Medical History:   Diagnosis Date    Cancer [facility])     Cerebrovascular disease     Chest pain     COPD (chronic obstructive pulmonary disease) ([address])     Coronary artery disease     Remote stenting    Diabetes mellitus ([address])     Hyperlipidemia     Hypertension     Hypothyroidism     On home oxygen therapy     2L @ hs & with exertional activity      Social History     Socioeconomic History    Marital status:       Spouse name: Not on file    Number of children: Not on file    Years of education: Not on file    Highest education level: Not on file   Occupational History    Not on file   Tobacco Use    Smoking status: Former     Types: Cigarettes    Smokeless tobacco: Never    Tobacco comments:     quit 2000    Vaping Use    Vaping Use: Never used   Substance and Sexual Activity    Alcohol use: Not Currently    Drug use: Never    Sexual activity: Not Currently   Other Topics Concern    Not on file   Social History Narrative    Not on file     Social Determinants of Health     Financial Resource Strain: Not on file   Food Insecurity: Not on file   Transportation Needs: Not on file   Physical Activity: Not on file   Stress: Not on file   Social Connections: Not on file   Intimate Partner Violence: Not on file   Housing Stability: Not on file      Family History   Problem Relation Age of Onset    Cancer Mother     Diabetes Father     Hypertension Father     Heart attack Father     Diabetes Sister     Cancer Sister     Valvular heart disease Sister         AVR    Heart disease Brother         CABG     Stroke Maternal Grandmother      Past Surgical History:   Procedure Laterality Date    CATARACT EXTRACTION      CORONARY ANGIOPLASTY WITH STENT PLACEMENT      placed [id number]    CT GUIDED PERC DRAINAGE CATHETER PLACEMENT  1/4/2021    EXTRACORPOREAL SHOCK WAVE LITHOTRIPSY      [address] W/ ANASTOMOSIS Left 12/18/2020    Procedure: LAPAROSCOPIC HEMICOLECTOMY, TAKE DOWN OF SPLENIC FLEXURE, COLORECTAL ANASTAMOSIS;  Surgeon: Lise Landeros MD;  Location: [facility];  Service: Colorectal    HYSTERECTOMY      KIDNEY SURGERY      'growth removed'     NERVE SURGERY      R arm     TONSILLECTOMY      TUBAL LIGATION         Current Outpatient Medications:     amLODIPine (NORVASC) 5 mg tablet, Take 5 mg by mouth daily, Disp: , Rfl:     ASPIRIN 81 PO, Take 81 mg by mouth, Disp: , Rfl:     Calcium Carb-Cholecalciferol (CALCIUM 600/VITAMIN D3 PO), Take by mouth, Disp: , Rfl:     co-enzyme Q-10 30 MG capsule, Take 30 mg by mouth 3 (three) times a day, Disp: , Rfl:     glimepiride (AMARYL) 1 mg tablet, Take 1 mg by mouth 2 (two) times daily after meals 1 tablet in am and 1/2 tablet at night, Disp: , Rfl:     Levothyroxine Sodium 125 MCG CAPS, Take 125 mcg by mouth daily , Disp: , Rfl:     lisinopril (ZESTRIL) 40 mg tablet, Take 40 mg by mouth daily, Disp: , Rfl:     metFORMIN (GLUCOPHAGE) 500 mg tablet, Take 500 mg by mouth 4 (four) times a day, Disp: , Rfl:     metoprolol succinate (TOPROL-XL) 50 mg 24 hr tablet, 50 mg in am; and 25 mg in pm, Disp: , Rfl:     nitroglycerin (NITROSTAT) 0.4 mg SL tablet, Place 0.4 mg under the tongue every 5 (five) minutes as needed for chest pain, Disp: , Rfl:     OXYGEN-HELIUM IN, Inhale 2 L/min, Disp: , Rfl:     simvastatin (ZOCOR) 20 mg tablet, , Disp: , Rfl:     tiotropium-olodaterol (Stiolto Respimat) 2.5-2.5 MCG/ACT inhaler, Inhale 2 puffs daily, Disp: , Rfl:      Allergies   Allergen Reactions    Demerol [Meperidine] Other (See Comments)     'eyes enlarged'          Cardiac Test Results:     Lipid panel 4/28/2022:     ECG 4/21/2022:  Normal sinus rhythm. Nonspecific anterior ST T wave abnormality. Lipid panel 10/11/2021: C 180. T156. H 52. L 97. ZIO 6/17-6/24/2021:   Min HR 57. Max . Avg HR 73.   8 SVT episodes with the longest lasting 15.1 seconds. Rare PAC's. Rare PVC's. Two triggered events both correlating with PAC's and PVC's. Lipid panel 01/06/2021: C 121. T152. H 19. L 72. Echocardiogram 01/05/2021:  EF 60%. Severe hypokinesis of mid anteroseptal and mid inferoseptal wall. Mild diastolic dysfunction. Thickened atrial septum. Mild mitral regurgitation. Review of Systems:    Review of Systems   Constitutional: Positive for fatigue. Negative for activity change and appetite change. HENT: Negative for congestion, hearing loss, tinnitus and trouble swallowing. Eyes: Negative for visual disturbance. Respiratory: Positive for chest tightness and shortness of breath (stable). Negative for cough and wheezing. Snores   Cardiovascular: Positive for leg swelling. Negative for chest pain and palpitations. Gastrointestinal: Negative for abdominal distention, abdominal pain, nausea and vomiting. Genitourinary: Negative for difficulty urinating. Musculoskeletal: Negative for arthralgias. Skin: Negative for rash. Neurological: Negative for dizziness, syncope and light-headedness. Hematological: Does not bruise/bleed easily. Psychiatric/Behavioral: Negative for confusion. The patient is not nervous/anxious. All other systems reviewed and are negative. Vitals:    05/02/23 1110   BP: 154/62   Pulse: 89   SpO2: 97%   Weight: 62.7 kg (138 lb 3.2 oz)   Height: 4' 11.5" (1.511 m)     Vitals:    05/02/23 1110   Weight: 62.7 kg (138 lb 3.2 oz)     Height: 4' 11.5" (151.1 cm)     Physical Exam  Constitutional:       Appearance: She is well-developed. HENT:      Head: Normocephalic and atraumatic. Eyes:      Conjunctiva/sclera: Conjunctivae normal.      Pupils: Pupils are equal, round, and reactive to light. Neck:      Vascular: No JVD. Cardiovascular:      Rate and Rhythm: Normal rate and regular rhythm. Heart sounds: Normal heart sounds. No murmur heard. No friction rub. No gallop. Pulmonary:      Effort: Pulmonary effort is normal.      Breath sounds: Decreased breath sounds present. Abdominal:      General: Bowel sounds are normal.      Palpations: Abdomen is soft. Musculoskeletal:      Cervical back: Normal range of motion. Skin:     General: Skin is warm and dry. Neurological:      Mental Status: She is alert and oriented to person, place, and time.    Psychiatric:         Behavior: Behavior normal.

## 2023-08-07 ENCOUNTER — OFFICE VISIT (OUTPATIENT)
Dept: CARDIOLOGY CLINIC | Facility: CLINIC | Age: 83
End: 2023-08-07
Payer: MEDICARE

## 2023-08-07 VITALS
BODY MASS INDEX: 26.7 KG/M2 | DIASTOLIC BLOOD PRESSURE: 70 MMHG | SYSTOLIC BLOOD PRESSURE: 150 MMHG | HEART RATE: 70 BPM | HEIGHT: 60 IN | OXYGEN SATURATION: 94 % | WEIGHT: 136 LBS

## 2023-08-07 DIAGNOSIS — E03.9 HYPOTHYROIDISM, UNSPECIFIED TYPE: ICD-10-CM

## 2023-08-07 DIAGNOSIS — J44.9 CHRONIC OBSTRUCTIVE PULMONARY DISEASE, UNSPECIFIED COPD TYPE (HCC): ICD-10-CM

## 2023-08-07 DIAGNOSIS — I10 PRIMARY HYPERTENSION: ICD-10-CM

## 2023-08-07 DIAGNOSIS — E78.2 MIXED HYPERLIPIDEMIA: ICD-10-CM

## 2023-08-07 DIAGNOSIS — I25.10 CORONARY ARTERY DISEASE INVOLVING NATIVE CORONARY ARTERY OF NATIVE HEART WITHOUT ANGINA PECTORIS: Primary | ICD-10-CM

## 2023-08-07 DIAGNOSIS — C18.9 ADENOCARCINOMA OF COLON (HCC): ICD-10-CM

## 2023-08-07 DIAGNOSIS — R55 VASOVAGAL SYNCOPE: ICD-10-CM

## 2023-08-07 DIAGNOSIS — R00.2 PALPITATIONS: ICD-10-CM

## 2023-08-07 DIAGNOSIS — E11.9 TYPE 2 DIABETES MELLITUS WITHOUT COMPLICATION, WITHOUT LONG-TERM CURRENT USE OF INSULIN (HCC): ICD-10-CM

## 2023-08-07 PROCEDURE — 99214 OFFICE O/P EST MOD 30 MIN: CPT | Performed by: NURSE PRACTITIONER

## 2023-08-07 PROCEDURE — 93000 ELECTROCARDIOGRAM COMPLETE: CPT | Performed by: NURSE PRACTITIONER

## 2023-08-07 NOTE — ASSESSMENT & PLAN NOTE
Lab Results   Component Value Date    HGBA1C 7.8 (H) 04/28/2022     Managed by PCP. Reviewed the importance of glucose control for cardiovascular protection.

## 2023-08-07 NOTE — ASSESSMENT & PLAN NOTE
Notes episodes of heart racing which are rare and short lived typically occurring upon awakening. 7 day ZIO 6/17-6/24/2021 showed symptomatic PAC's and PVC's and 8 asymptomatic episodes of SVT with the longest lasting 15.1 seconds. I encouraged her to take magnesium 250 mg daily which she has not been doing. We discussed that if symptoms become more frequent or are lasting longer I would recommend updated ZIO monitoring.

## 2023-08-07 NOTE — PROGRESS NOTES
Cardiology Follow Up    Andre Gonsalves  1940  [id number]  [facility] [address]  [address] [facility] [id number] [phone]    Follow up coronary artery disease, hypertension, hyperlipidemia and chest pain. 1. Coronary artery disease involving native coronary artery of native heart without angina pectoris  Assessment & Plan:  Patient with known coronary artery disease. Remote stenting 25+ years ago. Patient does have ongoing episodes of chest discomfort which have been present for some time and have remained unchanged. She describes localized dull left-sided chest pain not necessarily associated with exertion and possibly related to chest tightness due to difficulty breathing. [facility] nuclear stress test 11/8/2022 shows no evidence of ischemia with preserved EF. Continue aspirin 81 mg daily, simvastatin 20 mg daily, metoprolol succinate 50 mg in am, 25 mg pm, amlodipine 5 mg daily. We reviewed urgent s/s to report. Will monitor. Orders:  -     POCT ECG    2. Primary hypertension  Assessment & Plan:  Blood pressure is elevated on exam today however she reports good control at home and recent OV's. I have asked her to continue to monitor and report readings frequently > 140/90. Currently taking amlodipine 5 mg daily and metoprolol succinate 50 mg in am, 25 mg in pm.  Previously on lisinopril which was stopped in 2021 for unclear reasons. Would consider losartan 25 mg if needed. 3. Mixed hyperlipidemia  Assessment & Plan:  Lipid panel 4/28/2022: C 175. T 137. H 52. L 96. Lipids were not drawn on 4/2023 labs, patient reports she will be completing with 10/2023 labs. Continues on simvastatin 20 mg daily for now. Would titrate further to achieve goal LDL < 70 pending upcoming results. I asked for a copy of upcoming labs.       4. Type 2 diabetes mellitus without complication, without long-term current use of insulin [facility])  Assessment & Plan:    Lab Results   Component Value Date    HGBA1C 7.8 (H) 04/28/2022     Managed by PCP. Reviewed the importance of glucose control for cardiovascular protection. 5. Hypothyroidism, unspecified type  Assessment & Plan:  Managed by PCP. 6. Adenocarcinoma of colon  Assessment & Plan:  S/p hemicolectomy. Following with [facility] in [place]. Currently awaiting additional testing. 7. Chronic obstructive pulmonary disease, unspecified COPD type ([address])  Assessment & Plan:  Oxygen dependent COPD. Stable shortness of breath/dyspnea on exertion. Follows with pulmonary in [place]      8. Palpitations  Assessment & Plan:  Notes episodes of heart racing which are rare and short lived typically occurring upon awakening. 7 day ZIO 6/17-6/24/2021 showed symptomatic PAC's and PVC's and 8 asymptomatic episodes of SVT with the longest lasting 15.1 seconds. I encouraged her to take magnesium 250 mg daily which she has not been doing. We discussed that if symptoms become more frequent or are lasting longer I would recommend updated ZIO monitoring. 9. Vasovagal syncope  Assessment & Plan:  Syncope noted during hospitalization 1/2021 which occurred in the setting of orthostatic hypotension. Patient reports 1 prior episode of syncope which occurred many years ago in extreme heat. Patient will continue to monitor for lightheadedness/ dizziness or near syncope. No recurrences since her hospitalization. ARIES Gonzalez has a past medical history of CAD s/p remote stenting (> 25 years ago), HTN, HLD, T2DM, COPD, chronic hypoxic respiratory failure on supplemental O2, colon cancer s/p left hemicolectomy in 2020. She has significant family history of CAD with father and sister with CAD, son suffered cardiac arrest.    She was not following routinely with cardiology until 2021.      She established with cardiology during an admission to [facility] in January 2021 where she was treated for an abscess following her hemicolectomy. She had a syncopal event while ambulating to the bathroom. Cardiology was consulted. Echocardiogram showed a new regional wall motion abnormality compared to her 9/2020 study. Syncope was felt to be due to orthostatic hypotension with consideration for outpatient ischemic evaluation. She established care with Dr. Gilmar Mendoza on 3/8/2021. She was doing well with occasional dull, left sided chest pain. She denied lightheadedness/dizziness or near syncope since discharge. A 7 day ZIO monitor was ordered. She declined stress test at that time. 7 day ZIO monitor showed a few short runs of SVT with no concerning findings. At 10/21/2021 follow up she reported that her lisinopril was stopped, but commented "don't ask" when questioned why. At her 11/1/2022 appt she reported chest heaviness when lifting. A Lexiscan nuclear stress test was completed 11/8/2022 which showed no evidence of ischemia with preserved EF. She followed up most recently with Dr. Gilmar Mendoza on 5/2/2023 at which time no changes were made to her regimen. 8/7/2023Raquel Gonzalez presents today for routine follow up. She tells me that she is stable from a cardiac standpoint. She denies chest heaviness. Continues with sporadic, short lived episodes of sharp, mid-sternal chest discomfort which she reports have been ongoing for years and are not associated with any other symptoms. Breathing is stable. No swelling. Rare, short lived heart racing episodes noted (typically will occur upon awakening). She is following with pulmonology and GI in [place]. She is awaiting an updated scope through GI and further work up of enlarged lymph nodes and an abdominal hernia. She completed labs for her PCP in April and results are reviewed in "care everywhere". HA1c was up to 8. It does not appear lipids were drawn. She is going for labs again in October.     Medical Problems     Problem List     Essential hypertension    Diabetes mellitus type 2    COPD    Oxygen dependent    GI malignancy (HCC)    CAD (coronary artery disease)    Hypothyroidism    Hyperlipidemia    Postoperative intra-abdominal abscess    Syncope    Adnexal cyst    Nephrolithiasis    Adenocarcinoma of colon    Hypophosphatemia    Bilateral lower extremity edema    Palpitations        Past Medical History:   Diagnosis Date   • Cancer [facility])    • Cerebrovascular disease    • Chest pain    • COPD (chronic obstructive pulmonary disease) ([address])    • Coronary artery disease     Remote stenting   • Diabetes mellitus ([address])    • Hyperlipidemia    • Hypertension    • Hypothyroidism    • On home oxygen therapy     2L @ hs & with exertional activity      Social History     Socioeconomic History   • Marital status:       Spouse name: Not on file   • Number of children: Not on file   • Years of education: Not on file   • Highest education level: Not on file   Occupational History   • Not on file   Tobacco Use   • Smoking status: Former     Types: Cigarettes   • Smokeless tobacco: Never   • Tobacco comments:     quit 2000    Vaping Use   • Vaping Use: Never used   Substance and Sexual Activity   • Alcohol use: Not Currently   • Drug use: Never   • Sexual activity: Not Currently   Other Topics Concern   • Not on file   Social History Narrative   • Not on file     Social Determinants of Health     Financial Resource Strain: Not on file   Food Insecurity: Not on file   Transportation Needs: Not on file   Physical Activity: Not on file   Stress: Not on file   Social Connections: Not on file   Intimate Partner Violence: Not on file   Housing Stability: Not on file      Family History   Problem Relation Age of Onset   • Cancer Mother    • Diabetes Father    • Hypertension Father    • Heart attack Father    • Diabetes Sister    • Cancer Sister    • Valvular heart disease Sister         AVR   • Heart disease Brother         CABG    • Stroke Maternal Grandmother Past Surgical History:   Procedure Laterality Date   • CATARACT EXTRACTION     • CORONARY ANGIOPLASTY WITH STENT PLACEMENT      placed [id number]   • CT GUIDED PERC DRAINAGE CATHETER PLACEMENT  1/4/2021   • EXTRACORPOREAL SHOCK WAVE LITHOTRIPSY     • HEMICOLOECTOMY W/ ANASTOMOSIS Left 12/18/2020    Procedure: LAPAROSCOPIC HEMICOLECTOMY, TAKE DOWN OF SPLENIC FLEXURE, COLORECTAL ANASTAMOSIS;  Surgeon: Iftikhar Wells MD;  Location: [facility];  Service: Colorectal   • HYSTERECTOMY     • KIDNEY SURGERY      'growth removed'    • NERVE SURGERY      R arm    • TONSILLECTOMY     • TUBAL LIGATION         Current Outpatient Medications:   •  amLODIPine (NORVASC) 5 mg tablet, Take 5 mg by mouth daily, Disp: , Rfl:   •  ASPIRIN 81 PO, Take 81 mg by mouth, Disp: , Rfl:   •  Calcium Carb-Cholecalciferol (CALCIUM 600/VITAMIN D3 PO), Take by mouth, Disp: , Rfl:   •  co-enzyme Q-10 30 MG capsule, Take 30 mg by mouth 3 (three) times a day, Disp: , Rfl:   •  glimepiride (AMARYL) 1 mg tablet, Take 1 mg by mouth 2 (two) times daily after meals 1 tablet in am and 1/2 tablet at night, Disp: , Rfl:   •  Levothyroxine Sodium 125 MCG CAPS, Take 125 mcg by mouth daily , Disp: , Rfl:   •  metFORMIN (GLUCOPHAGE) 500 mg tablet, Take 500 mg by mouth 4 (four) times a day, Disp: , Rfl:   •  metoprolol succinate (TOPROL-XL) 50 mg 24 hr tablet, 50 mg in am; and 25 mg in pm, Disp: , Rfl:   •  nitroglycerin (NITROSTAT) 0.4 mg SL tablet, Place 0.4 mg under the tongue every 5 (five) minutes as needed for chest pain, Disp: , Rfl:   •  OXYGEN-HELIUM IN, Inhale 2 L/min, Disp: , Rfl:   •  simvastatin (ZOCOR) 20 mg tablet, , Disp: , Rfl:   •  tiotropium-olodaterol (Stiolto Respimat) 2.5-2.5 MCG/ACT inhaler, Inhale 2 puffs daily, Disp: , Rfl:   Allergies   Allergen Reactions   • Demerol [Meperidine] Other (See Comments)     'eyes enlarged'        Labs:     Chemistry        Component Value Date/Time    K 4.2 01/07/2021 0652     01/07/2021 1142 CO2 28 01/07/2021 0652    BUN 6 01/07/2021 0652    CREATININE 0.64 01/07/2021 0652        Component Value Date/Time    CALCIUM 9.4 01/07/2021 0652    ALKPHOS 87 01/02/2021 1936    AST 29 01/02/2021 1936    ALT 18 01/02/2021 1936        Cardiac Test Results:   ECG 8/7/2023: Normal sinus rhythm. Normal ECG. Rate 70 bpm.    Lexiscan nuclear stress test 11/8/2022:  Small apical anterior defect at rest and stress, consistent with artifact. No evidence of myocardial ischemia. Gated EF 77%.     Lipid panel 4/28/2022: C 175. T 137. H 52. L 96.     ECG 4/21/2022:  Normal sinus rhythm. Nonspecific anterior ST T wave abnormality.     Lipid panel 10/11/2021: C 180. T156. H 52. L 97.     ZIO 6/17-6/24/2021:   Min HR 57. Max . Avg HR 73.   8 SVT episodes with the longest lasting 15.1 seconds. Rare PAC's. Rare PVC's. Two triggered events both correlating with PAC's and PVC's.      Lipid panel 01/06/2021: C 121. T152. H 19. L 72.      Echocardiogram 01/05/2021:  EF 60%. Severe hypokinesis of mid anteroseptal and mid inferoseptal wall. Mild diastolic dysfunction. Thickened atrial septum. Mild mitral regurgitation. Review of Systems   Constitutional: Negative. HENT: Negative. Cardiovascular: Positive for chest pain (sharp, short lived, non-exertional, unchanged for years), dyspnea on exertion (stable) and palpitations (rare, short lived). Negative for irregular heartbeat, leg swelling, near-syncope and orthopnea. Respiratory: Positive for shortness of breath (stable). Negative for cough and snoring. Endocrine: Negative. Skin: Negative. Musculoskeletal: Negative. Gastrointestinal: Negative. Genitourinary: Negative. Neurological: Negative. Psychiatric/Behavioral: Negative. Vitals:    08/07/23 1056   BP: 150/70   Pulse: 70   SpO2:      Vitals:    08/07/23 1019   Weight: 61.7 kg (136 lb)     Height: 4' 11.5" (151.1 cm)   Body mass index is 27.01 kg/m².     Physical Exam  Vitals and nursing note reviewed. Constitutional:       General: She is not in acute distress. Appearance: She is well-developed. She is not diaphoretic. HENT:      Head: Normocephalic and atraumatic. Neck:      Thyroid: No thyromegaly. Vascular: No carotid bruit or JVD. Cardiovascular:      Rate and Rhythm: Normal rate and regular rhythm. No extrasystoles are present. Pulses: Intact distal pulses. Radial pulses are 2+ on the right side and 2+ on the left side. Heart sounds: Normal heart sounds, S1 normal and S2 normal. No murmur heard. Comments: No edema  Pulmonary:      Effort: Pulmonary effort is normal.      Breath sounds: Decreased breath sounds present. Comments: Utilizing 2 L NC supplemental O2  Abdominal:      General: There is no distension. Palpations: Abdomen is soft. Tenderness: There is no abdominal tenderness. Musculoskeletal:         General: Normal range of motion. Cervical back: Normal range of motion and neck supple. Lymphadenopathy:      Cervical: No cervical adenopathy. Skin:     General: Skin is warm and dry. Neurological:      Mental Status: She is alert and oriented to person, place, and time. Cranial Nerves: No cranial nerve deficit. Psychiatric:         Mood and Affect: Mood and affect normal.         Speech: Speech normal.         Behavior: Behavior normal. Behavior is cooperative.          Cognition and Memory: Cognition normal.

## 2023-08-07 NOTE — ASSESSMENT & PLAN NOTE
Patient with known coronary artery disease. Remote stenting 25+ years ago. Patient does have ongoing episodes of chest discomfort which have been present for some time and have remained unchanged. She describes localized dull left-sided chest pain not necessarily associated with exertion and possibly related to chest tightness due to difficulty breathing. [facility] nuclear stress test 11/8/2022 shows no evidence of ischemia with preserved EF. Continue aspirin 81 mg daily, simvastatin 20 mg daily, metoprolol succinate 50 mg in am, 25 mg pm, amlodipine 5 mg daily. We reviewed urgent s/s to report. Will monitor.

## 2023-08-07 NOTE — PATIENT INSTRUCTIONS
Continue current medications. Please ask PCP office to send October lab results. Your blood pressure was above goal today. Please let us know if persistently > 140/80. Contact us with any concerns - if you notice more frequent or longer lasting palpitations I would recommend getting a heart monitor.   OK to try magnesium (oxide) 250-500 mg daily

## 2023-08-07 NOTE — ASSESSMENT & PLAN NOTE
Blood pressure is elevated on exam today however she reports good control at home and recent OV's. I have asked her to continue to monitor and report readings frequently > 140/90. Currently taking amlodipine 5 mg daily and metoprolol succinate 50 mg in am, 25 mg in pm.  Previously on lisinopril which was stopped in 2021 for unclear reasons. Would consider losartan 25 mg if needed.

## 2023-08-07 NOTE — ASSESSMENT & PLAN NOTE
Lipid panel 4/28/2022: C 175. T 137. H 52. L 96. Lipids were not drawn on 4/2023 labs, patient reports she will be completing with 10/2023 labs. Continues on simvastatin 20 mg daily for now. Would titrate further to achieve goal LDL < 70 pending upcoming results. I asked for a copy of upcoming labs.

## 2023-08-07 NOTE — ASSESSMENT & PLAN NOTE
S/p hemicolectomy. Following with [facility] in [place]. Currently awaiting additional testing.

## 2023-08-07 NOTE — ASSESSMENT & PLAN NOTE
Oxygen dependent COPD. Stable shortness of breath/dyspnea on exertion.    Follows with pulmonary in [place]

## 2023-08-07 NOTE — ASSESSMENT & PLAN NOTE
Syncope noted during hospitalization 1/2021 which occurred in the setting of orthostatic hypotension. Patient reports 1 prior episode of syncope which occurred many years ago in extreme heat. Patient will continue to monitor for lightheadedness/ dizziness or near syncope. No recurrences since her hospitalization.

## 2023-10-23 ENCOUNTER — TELEPHONE (OUTPATIENT)
Dept: CARDIOLOGY CLINIC | Facility: CLINIC | Age: 83
End: 2023-10-23

## 2023-11-24 RX ORDER — LEVOTHYROXINE SODIUM 125 MCG
TABLET ORAL
COMMUNITY
Start: 2023-10-15

## 2023-11-27 NOTE — PROGRESS NOTES
[address], [address], [address] [place] [address]  Phone#  [phone] Fax#  [phone]  *-*-*-*-*-*-*-*-*-*-*-*-*-*-*-*-*-*-*-*-*-*-*-*-*-*-*-*-*-*-*-*-*-*-*-*-*-*-*-*-*-*-*-*-*-*-*-*-*-*-*-*-*-*  ENCOUNTER DATE: 11/28/23 5:18 PM  PATIENT NAME: Reginaldo Lewis   1940    [id number]  AGE:83 y.o. SEX: female  [address] [place] [address] MD     PRIMARY CARE PHYSICIAN: Edu Davis DO    DIAGNOSES:  1. Coronary artery disease with remote history of PCI of unspecified vessel  2. Primary hypertension  3 dyslipidemia  4. Diabetes mellitus  5. Hypothyroidism  6. Adenocarcinoma of colon status post hemicolectomy 2020  7. COPD with chronic hypoxemic respiratory failure on supplemental oxygen therapy  9. History of palpitations  10. Vasovagal syncope  11. Family history of early CAD    REGADENOSON NUCLEAR STRESS TEST 11/8/2022:  ·  Stress ECG: A pharmacological stress test was performed using regadenoson. Resting hypertension. Blood pressure demonstrated a normal response and heart rate demonstrated a normal response to stress. ·  Stress ECG: No ST deviation is noted. Arrhythmias during stress: PVCs in pairs x 1. Rare isolated PVC's. There were no arrhythmias during recovery. The ECG was not diagnostic due to pharmacological (vasodilator) stress. ·  Perfusion: There is a left ventricular perfusion defect that is small in size with mild reduction in uptake present in the apical anterior location(s) that is fixed. The defect appears to be probable artifact. ·  Stress Function: Left ventricular function post-stress is normal. Post-stress ejection fraction is 77 %. ·  Stress Combined Conclusion: There is image artifact, without diagnostic evidence for perfusion abnormality.     AMBULATORY EXTENDED HOLTER MONITOR June 2021: 7 day ZIO 6/17-6/24/2021 showed symptomatic PAC's and PVC's and 8 asymptomatic episodes of SVT with the longest lasting 15.1 seconds. ECHOCARDIOGRAM 1/5/2021:   Normal left ventricular systolic function, EF 99% severe hypokinesis of mid anteroseptal and mid inferoseptal wall, grade 1 diastolic dysfunction. Normal right ventricle size and function. Normal left and right atrial size. Normal mitral valve with mild mitral valve regurgitation. No aortic valve stenosis or regurgitation. No significant tricuspid valve regurgitation. No obvious pulm hypertension,  No pericardial effusion. CURRENT ECG   No results found for this visit on 11/28/23. CARDIOLOGY ASSESSMENT & PLAN     1. Primary hypertension  amLODIPine (NORVASC) 5 mg tablet    atorvastatin (LIPITOR) 20 mg tablet      2. Atherosclerosis of native coronary artery of native heart without angina pectoris        3. Mixed hyperlipidemia        4. Bilateral lower extremity edema          Atherosclerosis of native coronary artery of native heart without angina pectoris  Ms. Madalyn De Santiago is overall stable from cardiac perspective with no recent symptoms that suggest angina. Her blood pressure is however noted to be elevated. Physical examination there is no evidence of pulmonary or peripheral vascular congestion. Renal function and electrolytes are stable. -- I am increasing the frequency of amlodipine to 5 mg twice daily. She will continue metoprolol succinate at current dose. -- We are changing the simvastatin to atorvastatin due to concern for interaction between simvastatin and amlodipine. -- She is encouraged to continue normal activities. She will follow-up with her primary care physician. -- Cardiology follow-up will be arranged for 6 to 8 months time. -- Dietary and medical compliance are reinforced. -- She is advised  to report any concerning symptoms such as chest pain, shortness of breath, decline in exercise tolerance or presyncope/syncope.      I have spent a total time of 50 minutes on 11/28/23 in caring for this patient including reviewing and summarizing prior history and work-up, discussing results of the echocardiogram and management options and documenting the visit. INTERVAL HISTORY, HISTORY OF PRESENT ILLNESS & COMPREHENSIVE VISIT INFORMATION     Ayesha Mendoza is here for follow-up regarding her cardiac comorbidities which include: Coronary artery disease with history of remote PCI, hypertension, dyslipidemia, tachycardia and other comorbidities. She was last seen by cardiology advanced practitioner [facility] in August 2023. She is here for follow-up today. She has had no new diagnoses or hospitalizations or significant illnesses since last visit. She denies any recent unusual chest pain or shortness of breath or dizziness or lightheadedness or palpitations or passing out or near passing out episodes. Denies any orthopnea or PND or worsening pedal edema she reports being active and walking regularly. She reports being compliant with medications. Functional capacity status: Good   (Excellent- >10 METs; Good: (7-10 METs); Moderate (4-7 METs); Poor (<= 4 METs)    Any chronic stressors: None . (feeling of poor health, financial problems, and social isolation etc). Tobacco or alcohol dependence: she has never been a smoker and denies significant alcohol use. Current cardiac medications: Amlodipine 5 mg daily aspirin 81 mg daily metoprolol succinate 50 mg daily simvastatin 20 mg daily    Last recent comprehensive blood work available:   Routine chemistry 4/28/2022 sodium 139 potassium 4.4 chloride 103 bicarb 28 BUN 21 creatinine 0.80 GFR 74  Normal LFTs  Total cholesterol 175 HDL 52 triglyceride 137 calculated LDL 96  TSH 4.12 Free T41.22  As noted above in HPI  REVIEW OF SYSTEMS   Positive for: As noted above in HPI  Negative for: All remaining as reviewed below and in HPI.     SYSTEM SYMPTOMS REVIEWED:  General--weight change, fever, night sweats  Respiratory--cough, wheezing, shortness of breath, sputum production  Cardiovascular--chest pain, syncope, dyspnea on exertion, edema, decline in exercise tolerance, claudication   Gastrointestinal--persistent vomiting, diarrhea, abdominal distention, blood in stool   Muscular or skeletal--joint pain or swelling   Neurologic--headaches, syncope, abnormal movement  Hematologic--history of easy bruising and bleeding   Endocrine--thyroid enlargement, heat or cold intolerance, polyuria   Psychiatric--anxiety, depression     *-*-*-*-*-*-*-*-*-*-*-*-*-*-*-*-*-*-*-*-*-*-*-*-*-*-*-*-*-*-*-*-*-*-*-*-*-*-*-*-*-*-*-*-*-*-*-*-*-*-*-*-*-*-  VITAL SIGNS     CURRENT VITAL SIGNS:   Vitals:    11/28/23 1120 11/28/23 1147   BP: 158/68 154/80   BP Location: Left arm Left arm   Patient Position: Sitting Sitting   Cuff Size: Standard    Pulse: 71    Temp: (!) 96.5 °F (35.8 °C)    SpO2: 95%    Weight: 62.2 kg (137 lb 3.2 oz)    Height: 4' 11.5" (1.511 m)        BMI: Body mass index is 27.25 kg/m². WEIGHTS:   Wt Readings from Last 25 Encounters:   11/28/23 62.2 kg (137 lb 3.2 oz)   08/07/23 61.7 kg (136 lb)   05/02/23 62.7 kg (138 lb 3.2 oz)   11/01/22 64 kg (141 lb)   04/21/22 64.8 kg (142 lb 12.8 oz)   10/21/21 60.8 kg (134 lb)   06/17/21 58.5 kg (129 lb)   03/08/21 55.9 kg (123 lb 3.2 oz)   01/02/21 64.7 kg (142 lb 10.2 oz)   12/18/20 58.1 kg (128 lb)        *-*-*-*-*-*-*-*-*-*-*-*-*-*-*-*-*-*-*-*-*-*-*-*-*-*-*-*-*-*-*-*-*-*-*-*-*-*-*-*-*-*-*-*-*-*-*-*-*-*-*-*-*-*-  PHYSICAL EXAM     General Appearance:    Alert, cooperative, no distress, appears stated age   Head, Eyes, ENT:    No obvious abnormality, moist mucous mebranes. Neck:   Supple, no carotid bruit or JVD   Back:     Symmetric, no curvature. Lungs:     Respirations unlabored. Clear to auscultation bilaterally,    Chest wall:    No tenderness or deformity   Heart:    Regular rate and rhythm, S1 and S2 normal, no murmur, rub  or gallop.    Abdomen:     Soft, non-tender, No obvious masses, or organomegaly   Extremities: Extremities warm, no cyanosis or edema    Skin:   No venostatic changes in lower extremities. Normal skin color, texture, and turgor.  No rashes or lesions     *-*-*-*-*-*-*-*-*-*-*-*-*-*-*-*-*-*-*-*-*-*-*-*-*-*-*-*-*-*-*-*-*-*-*-*-*-*-*-*-*-*-*-*-*-*-*-*-*-*-*-*-*-*-  CURRENT MEDICATIONS LIST     Current Outpatient Medications:     amLODIPine (NORVASC) 5 mg tablet, Take 1 tablet (5 mg total) by mouth 2 (two) times a day, Disp: 180 tablet, Rfl: 3    ASPIRIN 81 PO, Take 81 mg by mouth, Disp: , Rfl:     atorvastatin (LIPITOR) 20 mg tablet, Take 1 tablet (20 mg total) by mouth daily, Disp: 90 tablet, Rfl: 3    Calcium Carb-Cholecalciferol (CALCIUM 600/VITAMIN D3 PO), Take by mouth, Disp: , Rfl:     co-enzyme Q-10 30 MG capsule, Take 30 mg by mouth 3 (three) times a day 200 mg, Disp: , Rfl:     glimepiride (AMARYL) 1 mg tablet, Take 1 mg by mouth 2 (two) times daily after meals 1 tablet in am and 1/2 tablet at night, Disp: , Rfl:     Levothyroxine Sodium 125 MCG CAPS, Take 125 mcg by mouth daily , Disp: , Rfl:     metFORMIN (GLUCOPHAGE) 500 mg tablet, Take 500 mg by mouth 4 (four) times a day, Disp: , Rfl:     metoprolol succinate (TOPROL-XL) 50 mg 24 hr tablet, 50 mg in am; and 25 mg in pm, Disp: , Rfl:     nitroglycerin (NITROSTAT) 0.4 mg SL tablet, Place 0.4 mg under the tongue every 5 (five) minutes as needed for chest pain, Disp: , Rfl:     OXYGEN-HELIUM IN, Inhale 2 L/min, Disp: , Rfl:     Synthroid 125 MCG tablet, , Disp: , Rfl:     tiotropium-olodaterol (Stiolto Respimat) 2.5-2.5 MCG/ACT inhaler, Inhale 2 puffs daily, Disp: , Rfl:        ALLERGIES     Allergies   Allergen Reactions    Demerol [Meperidine] Other (See Comments)     'eyes enlarged'        *-*-*-*-*-*-*-*-*-*-*-*-*-*-*-*-*-*-*-*-*-*-*-*-*-*-*-*-*-*-*-*-*-*-*-*-*-*-*-*-*-*-*-*-*-*-*-*-*-*-*-*-*-*-  LABORATORY DATA   No results found for: "NA"  Potassium   Date Value Ref Range Status   01/07/2021 4.2 3.5 - 5.3 mmol/L Final   01/06/2021 3.6 3.5 - 5.3 mmol/L Final   01/05/2021 3.7 3.5 - 5.3 mmol/L Final     Chloride   Date Value Ref Range Status   01/07/2021 105 100 - 108 mmol/L Final   01/06/2021 107 100 - 108 mmol/L Final   01/05/2021 105 100 - 108 mmol/L Final     CO2   Date Value Ref Range Status   01/07/2021 28 21 - 32 mmol/L Final   01/06/2021 26 21 - 32 mmol/L Final   01/05/2021 23 21 - 32 mmol/L Final     BUN   Date Value Ref Range Status   01/07/2021 6 5 - 25 mg/dL Final   01/06/2021 6 5 - 25 mg/dL Final   01/05/2021 6 5 - 25 mg/dL Final     Creatinine   Date Value Ref Range Status   01/07/2021 0.64 0.60 - 1.30 mg/dL Final     Comment:     Standardized to IDMS reference method   01/06/2021 0.47 (L) 0.60 - 1.30 mg/dL Final     Comment:     Standardized to IDMS reference method   01/05/2021 0.43 (L) 0.60 - 1.30 mg/dL Final     Comment:     Standardized to IDMS reference method     eGFR   Date Value Ref Range Status   01/07/2021 85 ml/min/1.73sq m Final   01/06/2021 94 ml/min/1.73sq m Final   01/05/2021 97 ml/min/1.73sq m Final     Calcium   Date Value Ref Range Status   01/07/2021 9.4 8.3 - 10.1 mg/dL Final   01/06/2021 8.5 8.3 - 10.1 mg/dL Final   01/05/2021 8.8 8.3 - 10.1 mg/dL Final     AST   Date Value Ref Range Status   01/02/2021 29 5 - 45 U/L Final     Comment:     Specimen collection should occur prior to Sulfasalazine administration due to the potential for falsely depressed results. ALT   Date Value Ref Range Status   01/02/2021 18 12 - 78 U/L Final     Comment:     Specimen collection should occur prior to Sulfasalazine administration due to the potential for falsely depressed results.       Alkaline Phosphatase   Date Value Ref Range Status   01/02/2021 87 46 - 116 U/L Final     Magnesium   Date Value Ref Range Status   01/04/2021 2.0 1.6 - 2.6 mg/dL Final   01/04/2021 1.6 1.6 - 2.6 mg/dL Final   12/20/2020 2.1 1.6 - 2.6 mg/dL Final     WBC   Date Value Ref Range Status   01/07/2021 9.02 4.31 - 10.16 Thousand/uL Final   01/06/2021 9.12 4.31 - 10.16 Thousand/uL Final   01/05/2021 11.59 (H) 4.31 - 10.16 Thousand/uL Final     Hemoglobin   Date Value Ref Range Status   01/07/2021 12.2 11.5 - 15.4 g/dL Final   01/06/2021 11.7 11.5 - 15.4 g/dL Final   01/05/2021 10.4 (L) 11.5 - 15.4 g/dL Final     Platelets   Date Value Ref Range Status   01/07/2021 409 (H) 149 - 390 Thousands/uL Final   01/06/2021 411 (H) 149 - 390 Thousands/uL Final   01/05/2021 386 149 - 390 Thousands/uL Final     PTT   Date Value Ref Range Status   12/18/2020 32 23 - 37 seconds Final     Comment:     Therapeutic Heparin Range =  60-90 seconds     INR   Date Value Ref Range Status   12/18/2020 0.96 0.84 - 1.19 Final     No results found for: "CKMB", "DIGOXIN"  No results found for: "TSH"  No results found for: "CHOL"   Hemoglobin A1C   Date Value Ref Range Status   04/28/2022 7.8 (H) <5.7 % Final     Comment:     Reference Range  Non-diabetic                     <5.7  Pre-diabetic                     5.7-6.4  Diabetic                         >=6.5  ADA target for diabetic control  <=7     Gram Stain Result   Date Value Ref Range Status   01/04/2021 4+ Polys (A)  Final   01/04/2021 4+ Gram variable rods (A)  Final     Body Fluid Culture, Sterile   Date Value Ref Range Status   01/04/2021 1+ Growth of Escherichia coli (A)  Final   01/04/2021 Few Colonies of Proteus mirabilis (A)  Final   01/04/2021 4+ Growth of Lactobacillus species (A)  Final      *-*-*-*-*-*-*-*-*-*-*-*-*-*-*-*-*-*-*-*-*-*-*-*-*-*-*-*-*-*-*-*-*-*-*-*-*-*-*-*-*-*-*-*-*-*-*-*-*-*-*-*-*-*-  SIGNATURES:   @SIG@   Chantal Holman MD; KEVEN    *-*-*-*-*-*-*-*-*-*-*-*-*-*-*-*-*-*-*-*-*-*-*-*-*-*-*-*-*-*-*-*-*-*-*-*-*-*-*-*-*-*-*-*-*-*-*-*-*-*-*-*-*-*-  PAST MEDICAL HISTORY:  Past Medical History:   Diagnosis Date    Cancer ([address])     Cerebrovascular disease     Chest pain     COPD (chronic obstructive pulmonary disease) (HCC)     Coronary artery disease     Remote stenting    Diabetes mellitus ([address])     Hyperlipidemia Hypertension     Hypothyroidism     On home oxygen therapy     2L @ hs & with exertional activity     PAST SURGICAL HISTORY:  Past Surgical History:   Procedure Laterality Date    CATARACT EXTRACTION      CORONARY ANGIOPLASTY WITH STENT PLACEMENT      placed [id number]    CT GUIDED PERC DRAINAGE CATHETER PLACEMENT  1/4/2021    EXTRACORPOREAL SHOCK WAVE LITHOTRIPSY      HEMICOLOECTOMY W/ ANASTOMOSIS Left 12/18/2020    Procedure: LAPAROSCOPIC HEMICOLECTOMY, TAKE DOWN OF SPLENIC FLEXURE, COLORECTAL ANASTAMOSIS;  Surgeon: Cory Matthew MD;  Location: [facility];  Service: Colorectal    HYSTERECTOMY      KIDNEY SURGERY      'growth removed'     NERVE SURGERY      R arm     TONSILLECTOMY      TUBAL LIGATION           FAMILY HISTORY:  Family History   Problem Relation Age of Onset    Cancer Mother     Diabetes Father     Hypertension Father     Heart attack Father     Diabetes Sister     Cancer Sister     Valvular heart disease Sister         AVR    Heart disease Brother         CABG     Stroke Maternal Grandmother     SOCIAL HISTORY:  Social History     Tobacco Use   Smoking Status Former    Types: Cigarettes   Smokeless Tobacco Never   Tobacco Comments    quit 2000       Social History     Substance and Sexual Activity   Alcohol Use Not Currently     Social History     Substance and Sexual Activity   Drug Use Never    [id number]   *-*-*-*-*-*-*-*-*-*-*-*-*-*-*-*-*-*-*-*-*-*-*-*-*-*-*-*-*-*-*-*-*-*-*-*-*-*-*-*-*-*-*-*-*-*-*-*-*-*-*-*-*-*

## 2023-11-28 ENCOUNTER — OFFICE VISIT (OUTPATIENT)
Dept: CARDIOLOGY CLINIC | Facility: CLINIC | Age: 83
End: 2023-11-28
Payer: MEDICARE

## 2023-11-28 VITALS
HEIGHT: 60 IN | OXYGEN SATURATION: 95 % | SYSTOLIC BLOOD PRESSURE: 154 MMHG | WEIGHT: 137.2 LBS | BODY MASS INDEX: 26.93 KG/M2 | DIASTOLIC BLOOD PRESSURE: 80 MMHG | TEMPERATURE: 96.5 F | HEART RATE: 71 BPM

## 2023-11-28 DIAGNOSIS — R60.0 BILATERAL LOWER EXTREMITY EDEMA: ICD-10-CM

## 2023-11-28 DIAGNOSIS — E78.2 MIXED HYPERLIPIDEMIA: ICD-10-CM

## 2023-11-28 DIAGNOSIS — I25.10 ATHEROSCLEROSIS OF NATIVE CORONARY ARTERY OF NATIVE HEART WITHOUT ANGINA PECTORIS: ICD-10-CM

## 2023-11-28 DIAGNOSIS — I10 PRIMARY HYPERTENSION: Primary | ICD-10-CM

## 2023-11-28 PROBLEM — I25.118 ATHEROSCLEROTIC HEART DISEASE OF NATIVE CORONARY ARTERY WITH OTHER FORMS OF ANGINA PECTORIS (HCC): Status: ACTIVE | Noted: 2020-12-18

## 2023-11-28 PROCEDURE — 99215 OFFICE O/P EST HI 40 MIN: CPT | Performed by: INTERNAL MEDICINE

## 2023-11-28 RX ORDER — AMLODIPINE BESYLATE 5 MG/1
5 TABLET ORAL 2 TIMES DAILY
Qty: 180 TABLET | Refills: 3 | Status: SHIPPED | OUTPATIENT
Start: 2023-11-28

## 2023-11-28 RX ORDER — ATORVASTATIN CALCIUM 20 MG/1
20 TABLET, FILM COATED ORAL DAILY
Qty: 90 TABLET | Refills: 3 | Status: SHIPPED | OUTPATIENT
Start: 2023-11-28

## 2023-11-28 NOTE — ASSESSMENT & PLAN NOTE
Ms. Mandy Cage is overall stable from cardiac perspective with no recent symptoms that suggest angina. Her blood pressure is however noted to be elevated. Physical examination there is no evidence of pulmonary or peripheral vascular congestion. Renal function and electrolytes are stable. -- I am increasing the frequency of amlodipine to 5 mg twice daily. She will continue metoprolol succinate at current dose. -- We are changing the simvastatin to atorvastatin due to concern for interaction between simvastatin and amlodipine. -- She is encouraged to continue normal activities. She will follow-up with her primary care physician. -- Cardiology follow-up will be arranged for 6 to 8 months time. -- Dietary and medical compliance are reinforced. -- She is advised  to report any concerning symptoms such as chest pain, shortness of breath, decline in exercise tolerance or presyncope/syncope. I have spent a total time of 50 minutes on 11/28/23 in caring for this patient including reviewing and summarizing prior history and work-up, discussing results of the echocardiogram and management options and documenting the visit.

## 2023-11-28 NOTE — PATIENT INSTRUCTIONS
CARDIOLOGY ASSESSMENT & PLAN     1. Primary hypertension  amLODIPine (NORVASC) 5 mg tablet    atorvastatin (LIPITOR) 20 mg tablet      2. Atherosclerosis of native coronary artery of native heart without angina pectoris        3. Mixed hyperlipidemia        4. Bilateral lower extremity edema          Atherosclerosis of native coronary artery of native heart without angina pectoris  Ms. Edmund Nicole is overall stable from cardiac perspective with no recent symptoms that suggest angina. Her blood pressure is however noted to be elevated. Physical examination there is no evidence of pulmonary or peripheral vascular congestion. Renal function and electrolytes are stable. -- I am increasing the frequency of amlodipine to 5 mg twice daily. She will continue metoprolol succinate at current dose. -- We are changing the simvastatin to atorvastatin due to concern for interaction between simvastatin and amlodipine. -- She is encouraged to continue normal activities. She will follow-up with her primary care physician. -- Cardiology follow-up will be arranged for 6 to 8 months time. -- Dietary and medical compliance are reinforced. -- She is advised  to report any concerning symptoms such as chest pain, shortness of breath, decline in exercise tolerance or presyncope/syncope. I have spent a total time of 50 minutes on 11/28/23 in caring for this patient including reviewing and summarizing prior history and work-up, discussing results of the echocardiogram and management options and documenting the visit.

## 2024-05-31 ENCOUNTER — OFFICE VISIT (OUTPATIENT)
Dept: CARDIOLOGY CLINIC | Facility: CLINIC | Age: 84
End: 2024-05-31
Payer: MEDICARE

## 2024-05-31 VITALS
SYSTOLIC BLOOD PRESSURE: 120 MMHG | WEIGHT: 132.6 LBS | HEART RATE: 89 BPM | HEIGHT: 60 IN | OXYGEN SATURATION: 96 % | TEMPERATURE: 97.7 F | DIASTOLIC BLOOD PRESSURE: 64 MMHG | RESPIRATION RATE: 20 BRPM | BODY MASS INDEX: 26.03 KG/M2

## 2024-05-31 DIAGNOSIS — E78.2 MIXED HYPERLIPIDEMIA: ICD-10-CM

## 2024-05-31 DIAGNOSIS — I10 PRIMARY HYPERTENSION: ICD-10-CM

## 2024-05-31 DIAGNOSIS — C18.9 ADENOCARCINOMA OF COLON (HCC): ICD-10-CM

## 2024-05-31 DIAGNOSIS — E11.9 TYPE 2 DIABETES MELLITUS WITHOUT COMPLICATION, WITHOUT LONG-TERM CURRENT USE OF INSULIN (HCC): ICD-10-CM

## 2024-05-31 DIAGNOSIS — E03.9 HYPOTHYROIDISM, UNSPECIFIED TYPE: ICD-10-CM

## 2024-05-31 DIAGNOSIS — R00.2 PALPITATIONS: ICD-10-CM

## 2024-05-31 DIAGNOSIS — I25.10 ATHEROSCLEROSIS OF NATIVE CORONARY ARTERY OF NATIVE HEART WITHOUT ANGINA PECTORIS: Primary | ICD-10-CM

## 2024-05-31 PROCEDURE — 99214 OFFICE O/P EST MOD 30 MIN: CPT | Performed by: NURSE PRACTITIONER

## 2024-05-31 NOTE — ASSESSMENT & PLAN NOTE
Lipid panel 4/28/2022: C 175. T 137. H 52. L 96.  Simvastatin 20 mg daily replaced with atorvastatin 20 mg daily 11/2023 due to concern for interaction with amlodipine.  I have requested recent lab results from PCP  Goal LDL < 55 given CAD and T2DM

## 2024-05-31 NOTE — PATIENT INSTRUCTIONS
Your blood pressure is excellent on my check today.  Change position slowly. Increase water intake. Contact the office if you continue with lightheadedness.  I will get your labs from your primary care and notify you if any concerns on  my end.   Contact us with any new or worsening symptoms.  A 1 week heart monitor will come to you in the mail. I will get the results about 2 weeks after you mail it back. We will call you with the results.

## 2024-05-31 NOTE — PROGRESS NOTES
Cardiology Follow Up    Irma Bustillo  1940  [id number]  [facility] [place] [address]  [address]LO[address]  [facility] [address] [phone]    Follow up coronary artery disease, hypertension, hyperlipidemia and chest pain.     1. Atherosclerosis of native coronary artery of native heart without angina pectoris  Assessment & Plan:  Patient with known coronary artery disease.  Remote stenting 25+ years ago.  Patient does have ongoing episodes of chest discomfort which have been present for some time and have remained unchanged.   She describes localized dull left-sided chest pain not necessarily associated with exertion and possibly related to chest tightness due to difficulty breathing.  Lexiscan nuclear stress test 11/8/2022 shows no evidence of ischemia with preserved EF.  Continue aspirin 81 mg daily, atorvastatin 20 mg daily, metoprolol succinate 50 mg in am, 25 mg pm, amlodipine 5 mg BID  We reviewed urgent s/s to report.  Will monitor.  2. Palpitations  Assessment & Plan:  Notes episodes of heart racing which are rare and short lived typically occurring upon awakening.  7 day ZIO 6/17-6/24/2021 showed symptomatic PAC's and PVC's and 8 asymptomatic episodes of SVT with the longest lasting 15.1 seconds.   I encouraged her to take magnesium 250 mg daily which she has not been doing.  Will obtain updated ZIO  Requested recent labs from PCP to check TSH  Orders:  -     AMB extended holter monitor; Future; Expected date: 05/31/2024  3. Primary hypertension  Assessment & Plan:  Blood pressure is excellent on my check today.  Currently taking amlodipine 5 mg BID and metoprolol succinate 50 mg in am, 25 mg in pm.  Previously on lisinopril which was stopped in 2021 for unclear reasons.  Requested labs from PCP  4. Mixed hyperlipidemia  Assessment & Plan:  Lipid panel 4/28/2022: C 175. T 137. H 52. L 96.  Simvastatin 20 mg daily replaced with atorvastatin 20 mg  "daily 11/2023 due to concern for interaction with amlodipine.  I have requested recent lab results from PCP  Goal LDL < 55 given CAD and T2DM  5. Type 2 diabetes mellitus without complication, without long-term current use of insulin (HCC)  Assessment & Plan:    Lab Results   Component Value Date    HGBA1C 7.8 (H) 04/28/2022     Managed by PCP.  Reviewed the importance of glucose control for cardiovascular protection.  6. Hypothyroidism, unspecified type  Assessment & Plan:  Managed by PCP.  7. Adenocarcinoma of colon  Assessment & Plan:  S/p hemicolectomy.  Following with [facility] [place] and [facility] general surgery.     ARIES Solis has a past medical history of CAD s/p remote stenting (> 25 years ago), HTN, HLD, T2DM, COPD, chronic hypoxic respiratory failure on supplemental O2, colon cancer s/p left hemicolectomy in 2020. She has significant family history of CAD with father and sister with CAD, son suffered cardiac arrest.     She was not following routinely with cardiology until 2021.      She established with cardiology during an admission to [facility] in January 2021 where she was treated for an abscess following her hemicolectomy. She had a syncopal event while ambulating to the bathroom. Cardiology was consulted. Echocardiogram showed a new regional wall motion abnormality compared to her 9/2020 study. Syncope was felt to be due to orthostatic hypotension with consideration for outpatient ischemic evaluation.      She established care with Dr. Pop on 3/8/2021. She was doing well with occasional dull, left sided chest pain. She denied lightheadedness/dizziness or near syncope since discharge. A 7 day ZIO monitor was ordered. She declined stress test at that time.     7 day ZIO monitor showed a few short runs of SVT with no concerning findings.      At 10/21/2021 follow up she reported that her lisinopril was stopped, but commented \"don't ask\" when questioned why.      At her 11/1/2022 appt she reported " chest heaviness when lifting. A Lexiscan nuclear stress test was completed 11/8/2022 which showed no evidence of ischemia with preserved EF.     She followed up most recently with Dr. Chantal Holman at our office on 11/28/2023 at which time BP was not at goal and amlodipine was increased to 5 mg BID. Simvastatin was replaced with atorvastatin 20 mg daily.    5/31/2024: Irma presents for routine follow up. Overall she feels symptoms are stable. She is taking and tolerating the increased dose of amlodipine and the atorvastatin. She notes positional lightheadedness in the past 2 weeks when bending over then standing up. No syncope/near syncope. No orthopnea. Occasional edema in her ankles depending on what she eats.She continues with a sporadic sharp mid sternal chest discomfort which remains unchanged for many years. She notes the heart racing upon awakening has increased in frequency, occurring several times per week and lasting for a few minutes. She continues to follow with PCP, GI, and pulmonology. She follows with [facility] surgery for management of an ostomy hernia. Remains on supplemental O2. She completed labs 2 weeks ago for her PCP. I will request a copy.     Medical Problems       Problem List       Atherosclerosis of native coronary artery of native heart without angina pectoris    Essential hypertension    Hyperlipidemia    Diabetes mellitus type 2    Hypothyroidism    Adenocarcinoma of colon    COPD    Oxygen dependent    GI malignancy (HCC)    Postoperative intra-abdominal abscess    Syncope    Adnexal cyst    Nephrolithiasis    Hypophosphatemia    Bilateral lower extremity edema    Palpitations        Past Medical History:   Diagnosis Date    Cancer (HCC)     Cerebrovascular disease     Chest pain     COPD (chronic obstructive pulmonary disease) (HCC)     Coronary artery disease     Remote stenting    Diabetes mellitus (HCC)     Hyperlipidemia     Hypertension     Hypothyroidism     On home oxygen  therapy     2L @ hs & with exertional activity      Social History     Socioeconomic History    Marital status:      Spouse name: Not on file    Number of children: Not on file    Years of education: Not on file    Highest education level: Not on file   Occupational History    Not on file   Tobacco Use    Smoking status: Former     Types: Cigarettes    Smokeless tobacco: Never    Tobacco comments:     quit 2000    Vaping Use    Vaping status: Never Used   Substance and Sexual Activity    Alcohol use: Not Currently    Drug use: Never    Sexual activity: Not Currently   Other Topics Concern    Not on file   Social History Narrative    Not on file     Social Determinants of Health     Financial Resource Strain: Not on file   Food Insecurity: Not on file   Transportation Needs: Not on file   Physical Activity: Not on file   Stress: Not on file   Social Connections: Not on file   Intimate Partner Violence: Not on file   Housing Stability: Not on file      Family History   Problem Relation Age of Onset    Cancer Mother     Diabetes Father     Hypertension Father     Heart attack Father     Diabetes Sister     Cancer Sister     Valvular heart disease Sister         AVR    Heart disease Brother         CABG     Stroke Maternal Grandmother      Past Surgical History:   Procedure Laterality Date    CATARACT EXTRACTION      CORONARY ANGIOPLASTY WITH STENT PLACEMENT      placed [id number]    CT GUIDED PERC DRAINAGE CATHETER PLACEMENT  1/4/2021    EXTRACORPOREAL SHOCK WAVE LITHOTRIPSY      HEMICOLOECTOMY W/ ANASTOMOSIS Left 12/18/2020    Procedure: LAPAROSCOPIC HEMICOLECTOMY, TAKE DOWN OF SPLENIC FLEXURE, COLORECTAL ANASTAMOSIS;  Surgeon: Deshaun Coffey MD;  Location: [facility];  Service: Colorectal    HYSTERECTOMY      KIDNEY SURGERY      'growth removed'     NERVE SURGERY      R arm     TONSILLECTOMY      TUBAL LIGATION         Current Outpatient Medications:     amLODIPine (NORVASC) 5 mg tablet, Take 1 tablet (5 mg  total) by mouth 2 (two) times a day, Disp: 180 tablet, Rfl: 3    ASPIRIN 81 PO, Take 81 mg by mouth, Disp: , Rfl:     atorvastatin (LIPITOR) 20 mg tablet, Take 1 tablet (20 mg total) by mouth daily, Disp: 90 tablet, Rfl: 3    Calcium Carb-Cholecalciferol (CALCIUM 600/VITAMIN D3 PO), Take by mouth, Disp: , Rfl:     co-enzyme Q-10 30 MG capsule, Take 30 mg by mouth 3 (three) times a day 200 mg, Disp: , Rfl:     glimepiride (AMARYL) 1 mg tablet, Take 1 mg by mouth 2 (two) times daily after meals 1 tablet in am and 1/2 tablet at night, Disp: , Rfl:     Levothyroxine Sodium 125 MCG CAPS, Take 125 mcg by mouth daily , Disp: , Rfl:     metFORMIN (GLUCOPHAGE) 500 mg tablet, Take 500 mg by mouth 4 (four) times a day, Disp: , Rfl:     metoprolol succinate (TOPROL-XL) 50 mg 24 hr tablet, 50 mg in am; and 25 mg in pm, Disp: , Rfl:     nitroglycerin (NITROSTAT) 0.4 mg SL tablet, Place 0.4 mg under the tongue every 5 (five) minutes as needed for chest pain, Disp: , Rfl:     OXYGEN-HELIUM IN, Inhale 2 L/min, Disp: , Rfl:     Synthroid 125 MCG tablet, , Disp: , Rfl:     tiotropium-olodaterol (Stiolto Respimat) 2.5-2.5 MCG/ACT inhaler, Inhale 2 puffs daily, Disp: , Rfl:   Allergies   Allergen Reactions    Demerol [Meperidine] Other (See Comments)     'eyes enlarged'        Labs:     Chemistry        Component Value Date/Time    K 4.4 04/28/2022 0725     04/28/2022 0725    CO2 28 04/28/2022 0725    BUN 21 04/28/2022 0725    CREATININE 0.80 04/28/2022 0725        Component Value Date/Time    CALCIUM 9.8 04/28/2022 0725    ALKPHOS 95 04/28/2022 0725    AST 21 04/28/2022 0725    ALT 25 04/28/2022 0725        Cardiac Test Results:   ECG 8/7/2023: Normal sinus rhythm. Normal ECG. Rate 70 bpm.     Lexiscan nuclear stress test 11/8/2022:  Small apical anterior defect at rest and stress, consistent with artifact.  No evidence of myocardial ischemia.  Gated EF 77%.     Lipid panel 4/28/2022: C 175. T 137. H 52. L 96.     ECG 4/21/2022:   "Normal sinus rhythm.  Nonspecific anterior ST T wave abnormality.     Lipid panel 10/11/2021: C 180. T156. H 52. L 97.     ZIO 6/17-6/24/2021:   Min HR 57. Max . Avg HR 73.   8 SVT episodes with the longest lasting 15.1 seconds.   Rare PAC's. Rare PVC's.   Two triggered events both correlating with PAC's and PVC's.      Lipid panel 01/06/2021: C 121. T152. H 19. L 72.      Echocardiogram 01/05/2021:  EF 60%.  Severe hypokinesis of mid anteroseptal and mid inferoseptal wall.  Mild diastolic dysfunction.  Thickened atrial septum.  Mild mitral regurgitation.    Review of Systems   Constitutional: Negative.   HENT: Negative.     Cardiovascular:  Positive for dyspnea on exertion, leg swelling and palpitations. Negative for chest pain, irregular heartbeat, near-syncope and orthopnea.   Respiratory:  Negative for cough and snoring.    Endocrine: Negative.    Skin: Negative.    Musculoskeletal: Negative.    Gastrointestinal: Negative.    Genitourinary: Negative.    Neurological: Negative.    Psychiatric/Behavioral: Negative.         Vitals:    05/31/24 0947   BP: 120/64   Pulse:    Resp:    Temp:    SpO2:      Vitals:    05/31/24 0924   Weight: 60.1 kg (132 lb 9.6 oz)     Height: 4' 11.5\" (151.1 cm)   Body mass index is 26.33 kg/m².    Physical Exam  Vitals and nursing note reviewed.   Constitutional:       General: She is not in acute distress.     Appearance: She is well-developed. She is not diaphoretic.   HENT:      Head: Normocephalic and atraumatic.   Neck:      Thyroid: No thyromegaly.      Vascular: No carotid bruit or JVD.   Cardiovascular:      Rate and Rhythm: Normal rate and regular rhythm. No extrasystoles are present.     Pulses: Intact distal pulses.           Radial pulses are 2+ on the right side and 2+ on the left side.      Heart sounds: Normal heart sounds, S1 normal and S2 normal. No murmur heard.     Comments: No edema on today's exam  Pulmonary:      Effort: Pulmonary effort is normal.      " Breath sounds: Decreased breath sounds present.      Comments: Supplemental O2  Abdominal:      General: There is no distension.      Palpations: Abdomen is soft.      Tenderness: There is no abdominal tenderness.   Musculoskeletal:         General: Normal range of motion.      Cervical back: Normal range of motion and neck supple.   Lymphadenopathy:      Cervical: No cervical adenopathy.   Skin:     General: Skin is warm and dry.   Neurological:      Mental Status: She is alert and oriented to person, place, and time.      Cranial Nerves: No cranial nerve deficit.   Psychiatric:         Mood and Affect: Mood and affect normal.         Behavior: Behavior normal.

## 2024-05-31 NOTE — ASSESSMENT & PLAN NOTE
S/p hemicolectomy.  Following with [facility] in [place] and [facility] general surgery.

## 2024-05-31 NOTE — ASSESSMENT & PLAN NOTE
Blood pressure is excellent on my check today.  Currently taking amlodipine 5 mg BID and metoprolol succinate 50 mg in am, 25 mg in pm.  Previously on lisinopril which was stopped in 2021 for unclear reasons.  Requested labs from PCP

## 2024-05-31 NOTE — ASSESSMENT & PLAN NOTE
Notes episodes of heart racing which are rare and short lived typically occurring upon awakening.  7 day ZIO 6/17-6/24/2021 showed symptomatic PAC's and PVC's and 8 asymptomatic episodes of SVT with the longest lasting 15.1 seconds.   I encouraged her to take magnesium 250 mg daily which she has not been doing.  Will obtain updated ZIO  Requested recent labs from PCP to check TSH

## 2024-05-31 NOTE — ASSESSMENT & PLAN NOTE
Patient with known coronary artery disease.  Remote stenting 25+ years ago.  Patient does have ongoing episodes of chest discomfort which have been present for some time and have remained unchanged.   She describes localized dull left-sided chest pain not necessarily associated with exertion and possibly related to chest tightness due to difficulty breathing.  Lexiscan nuclear stress test 11/8/2022 shows no evidence of ischemia with preserved EF.  Continue aspirin 81 mg daily, atorvastatin 20 mg daily, metoprolol succinate 50 mg in am, 25 mg pm, amlodipine 5 mg BID  We reviewed urgent s/s to report.  Will monitor.

## 2024-06-21 ENCOUNTER — TELEPHONE (OUTPATIENT)
Dept: NON INVASIVE DIAGNOSTICS | Facility: HOSPITAL | Age: 84
End: 2024-06-21

## 2024-06-21 ENCOUNTER — CLINICAL SUPPORT (OUTPATIENT)
Dept: CARDIOLOGY CLINIC | Facility: CLINIC | Age: 84
End: 2024-06-21
Payer: MEDICARE

## 2024-06-21 DIAGNOSIS — I47.29 NSVT (NONSUSTAINED VENTRICULAR TACHYCARDIA) (HCC): Primary | ICD-10-CM

## 2024-06-21 DIAGNOSIS — R00.2 PALPITATIONS: ICD-10-CM

## 2024-06-21 PROCEDURE — 93248 EXT ECG>7D<15D REV&INTERPJ: CPT

## 2024-06-21 RX ORDER — METOPROLOL SUCCINATE 50 MG/1
50 TABLET, EXTENDED RELEASE ORAL 2 TIMES DAILY
Qty: 180 TABLET | Refills: 3 | Status: SHIPPED | OUTPATIENT
Start: 2024-06-21 | End: 2024-07-05 | Stop reason: SDUPTHER

## 2024-06-21 NOTE — TELEPHONE ENCOUNTER
I discussed ZIO results with Irma. 1 NSVT run of 8 beats. 30 SVT runs, longest 10 seconds. She was symptomatic with sinus rhythm with PAC's. Overall in sinus rhythm with avg HR 83, slowest 62 bpm.  We will increase metoprolol succinate to 50 mg twice daily.  Will get magnesium serum level and updated echo.    Can you please mail the lab slip for magnesium to pt and schedule the echo?  Thank you!

## 2024-06-27 ENCOUNTER — HOSPITAL ENCOUNTER (OUTPATIENT)
Dept: NON INVASIVE DIAGNOSTICS | Facility: HOSPITAL | Age: 84
Discharge: HOME | End: 2024-06-27
Payer: MEDICARE

## 2024-06-27 VITALS
BODY MASS INDEX: 25.91 KG/M2 | SYSTOLIC BLOOD PRESSURE: 120 MMHG | DIASTOLIC BLOOD PRESSURE: 64 MMHG | HEIGHT: 60 IN | WEIGHT: 132 LBS

## 2024-06-27 DIAGNOSIS — I47.29 NSVT (NONSUSTAINED VENTRICULAR TACHYCARDIA) (HCC): ICD-10-CM

## 2024-06-27 LAB
AORTIC ROOT: 3.1 CM
AORTIC VALVE MEAN VELOCITY: 9.6 M/S
APICAL FOUR CHAMBER EJECTION FRACTION: 51 %
ASCENDING AORTA: 2.9 CM
AV LVOT MEAN GRADIENT: 1 MMHG
AV LVOT PEAK GRADIENT: 3 MMHG
AV MEAN GRADIENT: 4 MMHG
AV PEAK GRADIENT: 6 MMHG
AV VELOCITY RATIO: 0.68
BSA FOR ECHO PROCEDURE: 1.55 M2
DOP CALC AO PEAK VEL: 1.26 M/S
DOP CALC AO VTI: 24.43 CM
DOP CALC LVOT PEAK VEL VTI: 20.93 CM
DOP CALC LVOT PEAK VEL: 0.86 M/S
E WAVE DECELERATION TIME: 163 MS
E/A RATIO: 0.46
FRACTIONAL SHORTENING: 31 (ref 28–44)
INTERVENTRICULAR SEPTUM IN DIASTOLE (PARASTERNAL SHORT AXIS VIEW): 0.9 CM
INTERVENTRICULAR SEPTUM: 0.9 CM (ref 0.6–1.1)
LAAS-AP2: 14 CM2
LAAS-AP4: 14.1 CM2
LEFT ATRIUM SIZE: 3.1 CM
LEFT ATRIUM VOLUME (MOD BIPLANE): 41 ML
LEFT ATRIUM VOLUME INDEX (MOD BIPLANE): 26.3 ML/M2
LEFT INTERNAL DIMENSION IN SYSTOLE: 2.7 CM (ref 2.1–4)
LEFT VENTRICLE DIASTOLIC VOLUME (MOD BIPLANE): 62 ML
LEFT VENTRICLE DIASTOLIC VOLUME INDEX (MOD BIPLANE): 40 ML/M2
LEFT VENTRICLE SYSTOLIC VOLUME (MOD BIPLANE): 27 ML
LEFT VENTRICLE SYSTOLIC VOLUME INDEX (MOD BIPLANE): 17.4 ML/M2
LEFT VENTRICULAR INTERNAL DIMENSION IN DIASTOLE: 3.9 CM (ref 3.5–6)
LEFT VENTRICULAR POSTERIOR WALL IN END DIASTOLE: 0.8 CM
LEFT VENTRICULAR STROKE VOLUME: 41 ML
LV EF: 57 %
LVSV (TEICH): 41 ML
MV E'TISSUE VEL-LAT: 5 CM/S
MV E'TISSUE VEL-SEP: 4 CM/S
MV PEAK A VEL: 1.07 M/S
MV PEAK E VEL: 49 CM/S
MV STENOSIS PRESSURE HALF TIME: 47 MS
MV VALVE AREA P 1/2 METHOD: 4.68
RA PRESSURE ESTIMATED: 3 MMHG
RIGHT ATRIUM AREA SYSTOLE A4C: 12.2 CM2
RIGHT VENTRICLE ID DIMENSION: 3.3 CM
RV PSP: 14 MMHG
SL CV LEFT ATRIUM LENGTH A2C: 3.7 CM
SL CV LV EF: 56
SL CV PED ECHO LEFT VENTRICLE DIASTOLIC VOLUME (MOD BIPLANE) 2D: 67 ML
SL CV PED ECHO LEFT VENTRICLE SYSTOLIC VOLUME (MOD BIPLANE) 2D: 26 ML
TR MAX PG: 11 MMHG
TR PEAK VELOCITY: 1.6 M/S
TRICUSPID ANNULAR PLANE SYSTOLIC EXCURSION: 1.4 CM
TRICUSPID VALVE PEAK REGURGITATION VELOCITY: 1.64 M/S

## 2024-06-27 PROCEDURE — 93306 TTE W/DOPPLER COMPLETE: CPT

## 2024-07-02 ENCOUNTER — TELEPHONE (OUTPATIENT)
Dept: CARDIOLOGY CLINIC | Facility: CLINIC | Age: 84
End: 2024-07-02

## 2024-07-02 NOTE — TELEPHONE ENCOUNTER
----- Message from DAMARIS Braga sent at 7/1/2024  4:10 PM EDT -----  Please let Irma know that her echocardiogram shows normal heart function with no concerning findings.  Thank you!

## 2024-07-05 ENCOUNTER — TELEPHONE (OUTPATIENT)
Dept: CARDIOLOGY CLINIC | Facility: CLINIC | Age: 84
End: 2024-07-05

## 2024-07-05 DIAGNOSIS — R00.2 PALPITATIONS: ICD-10-CM

## 2024-07-05 DIAGNOSIS — I10 PRIMARY HYPERTENSION: ICD-10-CM

## 2024-07-05 RX ORDER — METOPROLOL SUCCINATE 50 MG/1
50 TABLET, EXTENDED RELEASE ORAL 2 TIMES DAILY
Qty: 180 TABLET | Refills: 3 | Status: SHIPPED | OUTPATIENT
Start: 2024-07-05

## 2024-07-05 RX ORDER — ATORVASTATIN CALCIUM 20 MG/1
20 TABLET, FILM COATED ORAL DAILY
Qty: 90 TABLET | Refills: 3 | Status: SHIPPED | OUTPATIENT
Start: 2024-07-05

## 2024-07-05 NOTE — TELEPHONE ENCOUNTER
Spoke with pt, she had mild confusion and many questions regarding medication lipitor and metoprolol. Reviewed all medications, dosages, indications. Verbalized understanding. Pt states she has not had the lipitor since Nov/Dec 2023.  [facility] but had no response.  [facility] to question why she could not get these medications filled.  [facility] states there are conflicting directions.  According to chart review, on 6/21/24, Vreenice Schwarz NP would like to increase her metoprolol succinate to 50mg po bid.  Will route to Verenice Schwarz to [facility]t [facility] as the old one was cancelled out per [facility] pharmacy tech.  Called pt to relay info as listed above. Unable to reach pt, message left to call back to let us know she received this message.

## 2024-07-08 DIAGNOSIS — R00.2 PALPITATIONS: Primary | ICD-10-CM

## 2024-07-09 LAB — MAGNESIUM SERPL-MCNC: 1.9 MG/DL (ref 1.4–2.2)

## 2024-07-09 RX ORDER — UREA 10 %
500 LOTION (ML) TOPICAL DAILY
Qty: 30 TABLET | Refills: 11 | Status: SHIPPED | OUTPATIENT
Start: 2024-07-09

## 2024-07-10 ENCOUNTER — TELEPHONE (OUTPATIENT)
Dept: CARDIOLOGY CLINIC | Facility: CLINIC | Age: 84
End: 2024-07-10

## 2024-07-10 NOTE — TELEPHONE ENCOUNTER
----- Message from DAMARIS Braga sent at 7/9/2024  4:58 PM EDT -----  Please let Irma know her magnesium level is slightly low at 1.9 and I would like it > 2. I have ordered a once daily magnesium supplement to take before bed.  Thank you

## 2024-09-30 NOTE — PROGRESS NOTES
Cardiology Follow Up    Irma Bustillo  1940  [id number]  [facility] [place] [address]  [address]LO[address]  [facility] [address] [phone]    Irma presents for routine follow up for coronary artery disease, hypertension, hyperlipidemia, and palpitations.     1. Atherosclerosis of native coronary artery of native heart without angina pectoris  Assessment & Plan:  Patient with known coronary artery disease.  Remote stenting 25+ years ago.  Patient does have ongoing episodes of chest discomfort which have been present for some time and have remained unchanged.   She describes localized dull left-sided chest pain not necessarily associated with exertion and possibly related to chest tightness due to difficulty breathing.  Lexiscan nuclear stress test 11/8/2022 shows no evidence of ischemia with preserved EF.  Continue aspirin 81 mg daily, atorvastatin 20 mg daily, metoprolol succinate 50 mg BID, amlodipine 5 mg BID  We reviewed urgent s/s to report.  Will monitor.  Orders:  -     POCT ECG  2. Primary hypertension  Assessment & Plan:  Blood pressure is excellent on my check today.  Currently taking amlodipine 5 mg BID and metoprolol succinate 50 mg BID  Previously on lisinopril which was stopped in 2021 for unclear reasons.  Requested labs from PCP  Orders:  -     amLODIPine (NORVASC) 5 mg tablet; Take 1 tablet (5 mg total) by mouth 2 (two) times a day  3. Mixed hyperlipidemia  Assessment & Plan:  Lipid panel 4/28/2022: C 175. T 137. H 52. L 96.  Simvastatin 20 mg daily replaced with atorvastatin 20 mg daily 11/2023 due to concern for interaction with amlodipine.  LDL 65 on May 2024 lab work.  Goal LDL < 55 given CAD and T2DM  She is preparing to undergo repeat labs this week - will watch for results and consider alternate statin.  4. Palpitations  Assessment & Plan:  Notes episodes of heart racing which are rare and short lived typically occurring upon  "awakening.  7 day ZIO 6/17-6/24/2021 showed symptomatic PAC's and PVC's and 8 asymptomatic episodes of SVT with the longest lasting 15.1 seconds.   Repeat ZIO 6/2024 shows 30 short SVT runs (longest 20 beats) and 2.2% PAC burden. Triggers were associated with SR with PAC's.  Continue magnesium supplement.  Continue metoprolol succinate 50 mg BID (she declines further dose adjustment).  TSH WNL.     HPI  Irma has a past medical history of CAD s/p remote stenting (> 25 years ago), HTN, HLD, T2DM, COPD, chronic hypoxic respiratory failure on supplemental O2, colon cancer s/p left hemicolectomy in 2020. She has significant family history of CAD with father and sister with CAD, son suffered cardiac arrest. SVT/PAC's on cardiac monitoring.      She was not following routinely with cardiology until 2021.      She established with cardiology during an admission to [facility] in January 2021 where she was treated for an abscess following her hemicolectomy. She had a syncopal event while ambulating to the bathroom. Cardiology was consulted. Echocardiogram showed a new regional wall motion abnormality compared to her 9/2020 study. Syncope was felt to be due to orthostatic hypotension with consideration for outpatient ischemic evaluation.      She established care with Dr. Pop on 3/8/2021. She was doing well with occasional dull, left sided chest pain. She denied lightheadedness/dizziness or near syncope since discharge. A 7 day ZIO monitor was ordered. She declined stress test at that time.     7 day ZIO monitor showed a few short runs of SVT with no concerning findings.      At 10/21/2021 follow up she reported that her lisinopril was stopped, but commented \"don't ask\" when questioned why.      At her 11/1/2022 appt she reported chest heaviness when lifting. A Lexiscan nuclear stress test was completed 11/8/2022 which showed no evidence of ischemia with preserved EF.    Amlodipine was increased to 5 mg BID for BP optimization in " "November 2023. Simvastatin was replaced with atorvastatin 20 mg daily.      She followed up most recently 5/31/2024 at which time she noted positional lightheadedness occasionally. No syncope/near syncope. She reported episodes of heart racing. She was started on magnesium and echo/ ZIO monitoring was ordered and completed showing 30 short runs of SVT and 2.2% PAC burden. She was symptomatic with SR with PAC's. Metoprolol succinate was increased to 50 mg BID. Echo showed no concerning findings (see details below).    10/1/2024: Irma presents for routine follow up. ECG today shows NSR. We reviewed her ZIO in detail. She is taking the metoprolol succinate 50 mg BID. She continues with brief \"flutters\" which are short lived and not associated with any other symptoms. She denies chest pain. Breathing is stable. No edema. She notes positional lightheadedness. No syncope/near syncope. She is complaining of muscle cramps since starting magnesium supplementation. She completed labs in May for her PCP with LDL 65. Ha1c 7.8. She states she will be going next week for another set of labs.     Medical Problems       Problem List       COPD    Oxygen dependent    GI malignancy (HCC)    Postoperative intra-abdominal abscess    Syncope    Adnexal cyst    Nephrolithiasis    Hypophosphatemia    Bilateral lower extremity edema    Palpitations    Atherosclerosis of native coronary artery of native heart without angina pectoris    Essential hypertension    Hyperlipidemia    Diabetes mellitus type 2    Hypothyroidism    Adenocarcinoma of colon        Past Medical History:   Diagnosis Date    Cancer (HCC)     Cerebrovascular disease     Chest pain     COPD (chronic obstructive pulmonary disease) (HCC)     Coronary artery disease     Remote stenting    Diabetes mellitus (HCC)     Hyperlipidemia     Hypertension     Hypothyroidism     On home oxygen therapy     2L @ hs & with exertional activity      Social History     Socioeconomic History "    Marital status:      Spouse name: Not on file    Number of children: Not on file    Years of education: Not on file    Highest education level: Not on file   Occupational History    Not on file   Tobacco Use    Smoking status: Former     Types: Cigarettes    Smokeless tobacco: Never    Tobacco comments:     quit 2000    Vaping Use    Vaping status: Never Used   Substance and Sexual Activity    Alcohol use: Not Currently    Drug use: Never    Sexual activity: Not Currently   Other Topics Concern    Not on file   Social History Narrative    Not on file     Social Determinants of Health     Financial Resource Strain: Not on file   Food Insecurity: Not on file   Transportation Needs: Not on file   Physical Activity: Not on file   Stress: Not on file   Social Connections: Not on file   Intimate Partner Violence: Not on file   Housing Stability: Not on file      Family History   Problem Relation Age of Onset    Cancer Mother     Diabetes Father     Hypertension Father     Heart attack Father     Diabetes Sister     Cancer Sister     Valvular heart disease Sister         AVR    Heart disease Brother         CABG     Stroke Maternal Grandmother      Past Surgical History:   Procedure Laterality Date    CATARACT EXTRACTION      CORONARY ANGIOPLASTY WITH STENT PLACEMENT      placed [id number]    CT GUIDED PERC DRAINAGE CATHETER PLACEMENT  1/4/2021    EXTRACORPOREAL SHOCK WAVE LITHOTRIPSY      HEMICOLOECTOMY W/ ANASTOMOSIS Left 12/18/2020    Procedure: LAPAROSCOPIC HEMICOLECTOMY, TAKE DOWN OF SPLENIC FLEXURE, COLORECTAL ANASTAMOSIS;  Surgeon: Deshaun Coffey MD;  Location: [facility];  Service: Colorectal    HYSTERECTOMY      KIDNEY SURGERY      'growth removed'     NERVE SURGERY      R arm     TONSILLECTOMY      TUBAL LIGATION         Current Outpatient Medications:     amLODIPine (NORVASC) 5 mg tablet, Take 1 tablet (5 mg total) by mouth 2 (two) times a day, Disp: 180 tablet, Rfl: 3    ASPIRIN 81 PO, Take 81 mg  by mouth, Disp: , Rfl:     atorvastatin (LIPITOR) 20 mg tablet, Take 1 tablet (20 mg total) by mouth daily, Disp: 90 tablet, Rfl: 3    Calcium Carb-Cholecalciferol (CALCIUM 600/VITAMIN D3 PO), Take by mouth, Disp: , Rfl:     co-enzyme Q-10 30 MG capsule, Take 30 mg by mouth 3 (three) times a day 200 mg, Disp: , Rfl:     glimepiride (AMARYL) 1 mg tablet, Take 1 mg by mouth 2 (two) times daily after meals 1 tablet in am and 1/2 tablet at night, Disp: , Rfl:     Levothyroxine Sodium 125 MCG CAPS, Take 125 mcg by mouth daily , Disp: , Rfl:     magnesium gluconate (MAGONATE) 500 mg tablet, Take 1 tablet (500 mg total) by mouth in the morning, Disp: 30 tablet, Rfl: 11    metFORMIN (GLUCOPHAGE) 500 mg tablet, Take 500 mg by mouth 4 (four) times a day, Disp: , Rfl:     metoprolol succinate (TOPROL-XL) 50 mg 24 hr tablet, Take 1 tablet (50 mg total) by mouth 2 (two) times a day, Disp: 180 tablet, Rfl: 3    nitroglycerin (NITROSTAT) 0.4 mg SL tablet, Place 0.4 mg under the tongue every 5 (five) minutes as needed for chest pain, Disp: , Rfl:     OXYGEN-HELIUM IN, Inhale 2 L/min, Disp: , Rfl:     Synthroid 125 MCG tablet, , Disp: , Rfl:     tiotropium-olodaterol (Stiolto Respimat) 2.5-2.5 MCG/ACT inhaler, Inhale 2 puffs daily, Disp: , Rfl:   Allergies   Allergen Reactions    Demerol [Meperidine] Other (See Comments)     'eyes enlarged'        Labs:     Chemistry        Component Value Date/Time    K 4.4 04/28/2022 0725     04/28/2022 0725    CO2 28 04/28/2022 0725    BUN 21 04/28/2022 0725    CREATININE 0.80 04/28/2022 0725        Component Value Date/Time    CALCIUM 9.8 04/28/2022 0725    ALKPHOS 95 04/28/2022 0725    AST 21 04/28/2022 0725    ALT 25 04/28/2022 0725        Cardiac Test Results:   ECG 10/1/2024: Normal sinus rhythm. Rate 80 bpm.     Echo 6/27/2024:    Left Ventricle: Left ventricular cavity size is normal. Wall thickness is normal. The left ventricular ejection fraction is 56% by biplane measurement.  Systolic function is normal. Although no diagnostic regional wall motion abnormality was identified, this possibility cannot be completely excluded on the basis of this study. Diastolic function is mildly abnormal, consistent with grade I (abnormal) relaxation.    Right Ventricle: Right ventricular cavity size is normal. Systolic function is mildly reduced.    Tricuspid Valve: The right ventricular systolic pressure is normal. The estimated right ventricular systolic pressure is 14.00 mmHg.    ZIO 6/5-6/12/2024:  Predominantly sinus rhythm, HR , avg 83 bpm.  One 8-beat NSVT.  30 runs of SVT, longest 10.7 seconds.  2.2% PAC. < 1% PVC burden.     ECG 8/7/2023: Normal sinus rhythm. Normal ECG. Rate 70 bpm.     Lexiscan nuclear stress test 11/8/2022:  Small apical anterior defect at rest and stress, consistent with artifact.  No evidence of myocardial ischemia.  Gated EF 77%.     Lipid panel 4/28/2022: C 175. T 137. H 52. L 96.     ECG 4/21/2022:  Normal sinus rhythm.  Nonspecific anterior ST T wave abnormality.     Lipid panel 10/11/2021: C 180. T156. H 52. L 97.     ZIO 6/17-6/24/2021:   Min HR 57. Max . Avg HR 73.   8 SVT episodes with the longest lasting 15.1 seconds.   Rare PAC's. Rare PVC's.   Two triggered events both correlating with PAC's and PVC's.      Lipid panel 01/06/2021: C 121. T152. H 19. L 72.      Echocardiogram 01/05/2021:  EF 60%.  Severe hypokinesis of mid anteroseptal and mid inferoseptal wall.  Mild diastolic dysfunction.  Thickened atrial septum.  Mild mitral regurgitation.    Review of Systems   Constitutional: Negative.   HENT: Negative.     Cardiovascular:  Positive for dyspnea on exertion (stable) and palpitations. Negative for chest pain, irregular heartbeat, leg swelling, near-syncope and orthopnea.   Respiratory:  Negative for cough and snoring.    Endocrine: Negative.    Skin: Negative.    Musculoskeletal: Negative.    Gastrointestinal: Negative.    Genitourinary: Negative.   "  Neurological: Negative.    Psychiatric/Behavioral: Negative.         Vitals:    10/01/24 0952   BP: 130/74   Pulse: 84   Temp: 97.7 °F (36.5 °C)   SpO2: 94%     Vitals:    10/01/24 0952   Weight: 58.4 kg (128 lb 12.8 oz)     Height: 4' 11.5\" (151.1 cm)   Body mass index is 25.58 kg/m².    Physical Exam  Vitals and nursing note reviewed.   Constitutional:       General: She is not in acute distress.     Appearance: She is well-developed. She is not diaphoretic.   HENT:      Head: Normocephalic and atraumatic.   Neck:      Vascular: No JVD.   Cardiovascular:      Rate and Rhythm: Normal rate and regular rhythm. No extrasystoles are present.     Pulses: Intact distal pulses.           Radial pulses are 2+ on the right side and 2+ on the left side.      Heart sounds: Normal heart sounds, S1 normal and S2 normal. No murmur heard.     Comments: No edema  Pulmonary:      Effort: Pulmonary effort is normal.      Breath sounds: Decreased breath sounds (throughout) present.      Comments: Wearing supplemental O2 via NC  Abdominal:      General: There is no distension.      Palpations: Abdomen is soft.      Tenderness: There is no abdominal tenderness.   Musculoskeletal:         General: Normal range of motion.      Cervical back: Normal range of motion and neck supple.   Skin:     General: Skin is warm and dry.   Neurological:      Mental Status: She is alert and oriented to person, place, and time.      Cranial Nerves: No cranial nerve deficit.   Psychiatric:         Mood and Affect: Mood and affect normal.         Behavior: Behavior normal.                "

## 2024-10-01 ENCOUNTER — OFFICE VISIT (OUTPATIENT)
Dept: CARDIOLOGY CLINIC | Facility: CLINIC | Age: 84
End: 2024-10-01
Payer: MEDICARE

## 2024-10-01 VITALS
DIASTOLIC BLOOD PRESSURE: 74 MMHG | BODY MASS INDEX: 25.29 KG/M2 | TEMPERATURE: 97.7 F | OXYGEN SATURATION: 94 % | WEIGHT: 128.8 LBS | HEIGHT: 60 IN | SYSTOLIC BLOOD PRESSURE: 130 MMHG | HEART RATE: 84 BPM

## 2024-10-01 DIAGNOSIS — I25.10 ATHEROSCLEROSIS OF NATIVE CORONARY ARTERY OF NATIVE HEART WITHOUT ANGINA PECTORIS: Primary | ICD-10-CM

## 2024-10-01 DIAGNOSIS — E78.2 MIXED HYPERLIPIDEMIA: ICD-10-CM

## 2024-10-01 DIAGNOSIS — R00.2 PALPITATIONS: ICD-10-CM

## 2024-10-01 DIAGNOSIS — I10 PRIMARY HYPERTENSION: ICD-10-CM

## 2024-10-01 PROCEDURE — 99214 OFFICE O/P EST MOD 30 MIN: CPT | Performed by: NURSE PRACTITIONER

## 2024-10-01 PROCEDURE — 93000 ELECTROCARDIOGRAM COMPLETE: CPT | Performed by: NURSE PRACTITIONER

## 2024-10-01 RX ORDER — AMLODIPINE BESYLATE 5 MG/1
5 TABLET ORAL 2 TIMES DAILY
Qty: 180 TABLET | Refills: 3 | Status: SHIPPED | OUTPATIENT
Start: 2024-10-01

## 2024-10-01 NOTE — ASSESSMENT & PLAN NOTE
Blood pressure is excellent on my check today.  Currently taking amlodipine 5 mg BID and metoprolol succinate 50 mg BID  Previously on lisinopril which was stopped in 2021 for unclear reasons.  Requested labs from PCP

## 2024-10-01 NOTE — ASSESSMENT & PLAN NOTE
Notes episodes of heart racing which are rare and short lived typically occurring upon awakening.  7 day ZIO 6/17-6/24/2021 showed symptomatic PAC's and PVC's and 8 asymptomatic episodes of SVT with the longest lasting 15.1 seconds.   Repeat ZIO 6/2024 shows 30 short SVT runs (longest 20 beats) and 2.2% PAC burden. Triggers were associated with SR with PAC's.  Continue magnesium supplement.  Continue metoprolol succinate 50 mg BID (she declines further dose adjustment).  TSH WNL.

## 2024-10-01 NOTE — PATIENT INSTRUCTIONS
Ok to try holding the magnesium, but if your leg cramps do not go away contact our office as we may need to try a different cholesterol pill.     Your blood pressure is good today. Make sure you are drinking about 6 glasses of water daily. Change position slowly.

## 2024-10-01 NOTE — ASSESSMENT & PLAN NOTE
Lipid panel 4/28/2022: C 175. T 137. H 52. L 96.  Simvastatin 20 mg daily replaced with atorvastatin 20 mg daily 11/2023 due to concern for interaction with amlodipine.  LDL 65 on May 2024 lab work.  Goal LDL < 55 given CAD and T2DM  She is preparing to undergo repeat labs this week - will watch for results and consider alternate statin.

## 2024-10-01 NOTE — ASSESSMENT & PLAN NOTE
Patient with known coronary artery disease.  Remote stenting 25+ years ago.  Patient does have ongoing episodes of chest discomfort which have been present for some time and have remained unchanged.   She describes localized dull left-sided chest pain not necessarily associated with exertion and possibly related to chest tightness due to difficulty breathing.  Lexiscan nuclear stress test 11/8/2022 shows no evidence of ischemia with preserved EF.  Continue aspirin 81 mg daily, atorvastatin 20 mg daily, metoprolol succinate 50 mg BID, amlodipine 5 mg BID  We reviewed urgent s/s to report.  Will monitor.

## 2025-04-15 ENCOUNTER — OFFICE VISIT (OUTPATIENT)
Dept: CARDIOLOGY CLINIC | Facility: CLINIC | Age: 85
End: 2025-04-15
Payer: MEDICARE

## 2025-04-15 VITALS
HEIGHT: 60 IN | SYSTOLIC BLOOD PRESSURE: 140 MMHG | WEIGHT: 130 LBS | OXYGEN SATURATION: 94 % | HEART RATE: 68 BPM | DIASTOLIC BLOOD PRESSURE: 70 MMHG | TEMPERATURE: 98.2 F | BODY MASS INDEX: 25.52 KG/M2

## 2025-04-15 DIAGNOSIS — R00.2 PALPITATIONS: ICD-10-CM

## 2025-04-15 DIAGNOSIS — E11.9 TYPE 2 DIABETES MELLITUS WITHOUT COMPLICATION, WITHOUT LONG-TERM CURRENT USE OF INSULIN (HCC): ICD-10-CM

## 2025-04-15 DIAGNOSIS — I25.10 ATHEROSCLEROSIS OF NATIVE CORONARY ARTERY OF NATIVE HEART WITHOUT ANGINA PECTORIS: Primary | ICD-10-CM

## 2025-04-15 DIAGNOSIS — E78.2 MIXED HYPERLIPIDEMIA: ICD-10-CM

## 2025-04-15 DIAGNOSIS — I10 PRIMARY HYPERTENSION: ICD-10-CM

## 2025-04-15 PROCEDURE — 99214 OFFICE O/P EST MOD 30 MIN: CPT | Performed by: NURSE PRACTITIONER

## 2025-04-15 RX ORDER — METOPROLOL SUCCINATE 50 MG/1
50 TABLET, EXTENDED RELEASE ORAL 2 TIMES DAILY
Qty: 180 TABLET | Refills: 3 | Status: SHIPPED | OUTPATIENT
Start: 2025-04-15

## 2025-04-15 RX ORDER — ATORVASTATIN CALCIUM 20 MG/1
20 TABLET, FILM COATED ORAL DAILY
Qty: 90 TABLET | Refills: 3 | Status: SHIPPED | OUTPATIENT
Start: 2025-04-15

## 2025-04-15 NOTE — ASSESSMENT & PLAN NOTE
Lipid panel 4/28/2022: C 175. T 137. H 52. L 96.  Simvastatin 20 mg daily replaced with atorvastatin 20 mg daily 11/2023 due to concern for interaction with amlodipine.  LDL 65 on May 2024 lab work.  Goal LDL < 55 given CAD and T2DM  Requested a copy of upcoming labs from the PCP

## 2025-04-15 NOTE — PROGRESS NOTES
Cardiology Follow Up    Irma Bustillo  1940  [id number]  [facility] [place] [address]  [address]  [facility] [address] [phone]    Irma presents for routine follow up for coronary artery disease, hypertension, hyperlipidemia, and palpitations.     1. Atherosclerosis of native coronary artery of native heart without angina pectoris  Assessment & Plan:  Patient with known coronary artery disease.  Remote stenting 25+ years ago.  Patient does have ongoing episodes of chest discomfort which have been present for some time and have remained unchanged.   She describes localized dull left-sided chest pain not necessarily associated with exertion and possibly related to chest tightness due to difficulty breathing.  Lexiscan nuclear stress test 11/8/2022 shows no evidence of ischemia with preserved EF.  Continue aspirin 81 mg daily, atorvastatin 20 mg daily, metoprolol succinate 50 mg BID, amlodipine 5 mg BID  We reviewed urgent s/s to report.  Will monitor.  2. Primary hypertension  Assessment & Plan:  Blood pressure is acceptable on my recheck  Currently taking amlodipine 5 mg BID and metoprolol succinate 50 mg BID  Previously on lisinopril which was stopped in 2021 for unclear reasons.  Requested labs from PCP  Orders:  -     atorvastatin (LIPITOR) 20 mg tablet; Take 1 tablet (20 mg total) by mouth daily  3. Mixed hyperlipidemia  Assessment & Plan:  Lipid panel 4/28/2022: C 175. T 137. H 52. L 96.  Simvastatin 20 mg daily replaced with atorvastatin 20 mg daily 11/2023 due to concern for interaction with amlodipine.  LDL 65 on May 2024 lab work.  Goal LDL < 55 given CAD and T2DM  Requested a copy of upcoming labs from the PCP  4. Palpitations  Assessment & Plan:  Notes episodes of heart racing which are rare and short lived typically occurring upon awakening.  7 day ZIO 6/17-6/24/2021 showed symptomatic PAC's and PVC's and 8 asymptomatic episodes of SVT  with the longest lasting 15.1 seconds.   Repeat ZIO 6/2024 shows 30 short SVT runs (longest 20 beats) and 2.2% PAC burden. Triggers were associated with SR with PAC's.  Continue magnesium supplement.  Continue metoprolol succinate 50 mg BID (she declines further dose adjustment).  TSH WNL.  Orders:  -     metoprolol succinate (TOPROL-XL) 50 mg 24 hr tablet; Take 1 tablet (50 mg total) by mouth 2 (two) times a day  5. Type 2 diabetes mellitus without complication, without long-term current use of insulin ([facility])  Assessment & Plan:    Lab Results   Component Value Date    HGBA1C 7.8 (H) 04/28/2022     Managed by PCP.  Reviewed the importance of glucose control for cardiovascular protection.     ARIES Solis has a past medical history of CAD s/p remote stenting (> 25 years ago), HTN, HLD, T2DM, COPD, chronic hypoxic respiratory failure on supplemental O2, colon cancer s/p left hemicolectomy in 2020. She has significant family history of CAD with father and sister with CAD, son suffered cardiac arrest. SVT/PAC's on cardiac monitoring.      She was not following routinely with cardiology until 2021.      She established with cardiology during an admission to [facility] in January 2021 where she was treated for an abscess following her hemicolectomy. She had a syncopal event while ambulating to the bathroom. Cardiology was consulted. Echocardiogram showed a new regional wall motion abnormality compared to her 9/2020 study. Syncope was felt to be due to orthostatic hypotension with consideration for outpatient ischemic evaluation.      She established care with Dr. Pop on 3/8/2021. She was doing well with occasional dull, left sided chest pain. She denied lightheadedness/dizziness or near syncope since discharge. A 7 day ZIO monitor was ordered. She declined stress test at that time.     7 day ZIO monitor showed a few short runs of SVT with no concerning findings.      At 10/21/2021 follow up she reported that her lisinopril was  "stopped, but commented \"don't ask\" when questioned why.      At her 11/1/2022 appt she reported chest heaviness when lifting. A Lexiscan nuclear stress test was completed 11/8/2022 which showed no evidence of ischemia with preserved EF.     Amlodipine was increased to 5 mg BID for BP optimization in November 2023. Simvastatin was replaced with atorvastatin 20 mg daily.      She followed up 5/31/2024 at which time she noted positional lightheadedness occasionally. No syncope/near syncope. She reported episodes of heart racing. She was started on magnesium and echo/ ZIO monitoring was ordered and completed showing 30 short runs of SVT and 2.2% PAC burden. She was symptomatic with SR with PAC's. Metoprolol succinate was increased to 50 mg BID. Echo showed no concerning findings (see details below).     4/15/2025: Irma presents for routine follow up. She is doing well from a cardiac standpoint. She denies palpitations, chest discomfort, lightheadedness. Stable minimal leg edema which comes and goes. Dyspnea remains unchanged. She continues with supplemental O2. She stopped the magnesium supplement and her leg cramps went away. She will be going for labs for her PCP next month. I have requested a copy.     Medical Problems       Problem List       COPD    Oxygen dependent    GI malignancy (HCC)    Postoperative intra-abdominal abscess    Syncope    Adnexal cyst    Nephrolithiasis    Hypophosphatemia    Bilateral lower extremity edema    Palpitations    Atherosclerosis of native coronary artery of native heart without angina pectoris    Essential hypertension    Hyperlipidemia    Diabetes mellitus type 2    Hypothyroidism    Adenocarcinoma of colon        Past Medical History:   Diagnosis Date    Cancer (HCC)     Cerebrovascular disease     Chest pain     COPD (chronic obstructive pulmonary disease) (HCC)     Coronary artery disease     Remote stenting    Diabetes mellitus (HCC)     Hyperlipidemia     Hypertension     " Hypothyroidism     On home oxygen therapy     2L @ hs & with exertional activity      Social History     Socioeconomic History    Marital status:      Spouse name: Not on file    Number of children: Not on file    Years of education: Not on file    Highest education level: Not on file   Occupational History    Not on file   Tobacco Use    Smoking status: Former     Types: Cigarettes    Smokeless tobacco: Never    Tobacco comments:     quit 2000    Vaping Use    Vaping status: Never Used   Substance and Sexual Activity    Alcohol use: Not Currently    Drug use: Never    Sexual activity: Not Currently   Other Topics Concern    Not on file   Social History Narrative    Not on file     Social Drivers of Health     Financial Resource Strain: Not on file   Food Insecurity: Not on file   Transportation Needs: Not on file   Physical Activity: Not on file   Stress: Not on file   Social Connections: Not on file   Intimate Partner Violence: Not on file   Housing Stability: Not on file      Family History   Problem Relation Age of Onset    Cancer Mother     Diabetes Father     Hypertension Father     Heart attack Father     Diabetes Sister     Cancer Sister     Valvular heart disease Sister         AVR    Heart disease Brother         CABG     Stroke Maternal Grandmother      Past Surgical History:   Procedure Laterality Date    CATARACT EXTRACTION      CORONARY ANGIOPLASTY WITH STENT PLACEMENT      placed [id number]    CT GUIDED PERC DRAINAGE CATHETER PLACEMENT  1/4/2021    EXTRACORPOREAL SHOCK WAVE LITHOTRIPSY      HEMICOLOECTOMY W/ ANASTOMOSIS Left 12/18/2020    Procedure: LAPAROSCOPIC HEMICOLECTOMY, TAKE DOWN OF SPLENIC FLEXURE, COLORECTAL ANASTAMOSIS;  Surgeon: Deshaun Coffey MD;  Location: [facility];  Service: Colorectal    HYSTERECTOMY      KIDNEY SURGERY      'growth removed'     NERVE SURGERY      R arm     TONSILLECTOMY      TUBAL LIGATION         Current Outpatient Medications:     amLODIPine (NORVASC) 5  mg tablet, Take 1 tablet (5 mg total) by mouth 2 (two) times a day, Disp: 180 tablet, Rfl: 3    ASPIRIN 81 PO, Take 81 mg by mouth, Disp: , Rfl:     atorvastatin (LIPITOR) 20 mg tablet, Take 1 tablet (20 mg total) by mouth daily, Disp: 90 tablet, Rfl: 3    Calcium Carb-Cholecalciferol (CALCIUM 600/VITAMIN D3 PO), Take by mouth, Disp: , Rfl:     co-enzyme Q-10 30 MG capsule, Take 30 mg by mouth 3 (three) times a day 200 mg, Disp: , Rfl:     glimepiride (AMARYL) 1 mg tablet, Take 1 mg by mouth 2 (two) times daily after meals 1 tablet in am and 1/2 tablet at night, Disp: , Rfl:     Levothyroxine Sodium 125 MCG CAPS, Take 125 mcg by mouth daily , Disp: , Rfl:     metFORMIN (GLUCOPHAGE) 500 mg tablet, Take 500 mg by mouth 2 (two) times a day with meals, Disp: , Rfl:     metoprolol succinate (TOPROL-XL) 50 mg 24 hr tablet, Take 1 tablet (50 mg total) by mouth 2 (two) times a day, Disp: 180 tablet, Rfl: 3    nitroglycerin (NITROSTAT) 0.4 mg SL tablet, Place 0.4 mg under the tongue every 5 (five) minutes as needed for chest pain, Disp: , Rfl:     OXYGEN-HELIUM IN, Inhale 2 L/min, Disp: , Rfl:     tiotropium-olodaterol (Stiolto Respimat) 2.5-2.5 MCG/ACT inhaler, Inhale 2 puffs daily, Disp: , Rfl:   Allergies   Allergen Reactions    Demerol [Meperidine] Other (See Comments)     'eyes enlarged'        Labs:     Chemistry        Component Value Date/Time    K 4.4 04/28/2022 0725     04/28/2022 0725    CO2 28 04/28/2022 0725    BUN 21 04/28/2022 0725    CREATININE 0.80 04/28/2022 0725        Component Value Date/Time    CALCIUM 9.8 04/28/2022 0725    ALKPHOS 95 04/28/2022 0725    AST 21 04/28/2022 0725    ALT 25 04/28/2022 0725        Cardiac Test Results:       ECG 10/1/2024: Normal sinus rhythm. Rate 80 bpm.      Echo 6/27/2024:    Left Ventricle: Left ventricular cavity size is normal. Wall thickness is normal. The left ventricular ejection fraction is 56% by biplane measurement. Systolic function is normal. Although no  diagnostic regional wall motion abnormality was identified, this possibility cannot be completely excluded on the basis of this study. Diastolic function is mildly abnormal, consistent with grade I (abnormal) relaxation.    Right Ventricle: Right ventricular cavity size is normal. Systolic function is mildly reduced.    Tricuspid Valve: The right ventricular systolic pressure is normal. The estimated right ventricular systolic pressure is 14.00 mmHg.     ZIO 6/5-6/12/2024:  Predominantly sinus rhythm, HR , avg 83 bpm.  One 8-beat NSVT.  30 runs of SVT, longest 10.7 seconds.  2.2% PAC. < 1% PVC burden.      ECG 8/7/2023: Normal sinus rhythm. Normal ECG. Rate 70 bpm.     Lexiscan nuclear stress test 11/8/2022:  Small apical anterior defect at rest and stress, consistent with artifact.  No evidence of myocardial ischemia.  Gated EF 77%.     Lipid panel 4/28/2022: C 175. T 137. H 52. L 96.     ECG 4/21/2022:  Normal sinus rhythm.  Nonspecific anterior ST T wave abnormality.     Lipid panel 10/11/2021: C 180. T156. H 52. L 97.     ZIO 6/17-6/24/2021:   Min HR 57. Max . Avg HR 73.   8 SVT episodes with the longest lasting 15.1 seconds.   Rare PAC's. Rare PVC's.   Two triggered events both correlating with PAC's and PVC's.      Lipid panel 01/06/2021: C 121. T152. H 19. L 72.      Echocardiogram 01/05/2021:  EF 60%.  Severe hypokinesis of mid anteroseptal and mid inferoseptal wall.  Mild diastolic dysfunction.  Thickened atrial septum.  Mild mitral regurgitation.    Review of Systems   Constitutional: Negative.   HENT: Negative.     Cardiovascular:  Positive for dyspnea on exertion and leg swelling. Negative for chest pain, irregular heartbeat, near-syncope, orthopnea and palpitations.   Respiratory:  Negative for cough and snoring.    Endocrine: Negative.    Skin: Negative.    Musculoskeletal: Negative.    Gastrointestinal: Negative.    Genitourinary: Negative.    Neurological: Negative.   "  Psychiatric/Behavioral: Negative.         Vitals:    04/15/25 1046   BP: 140/70   Pulse:    Temp:    SpO2:      Vitals:    04/15/25 1016   Weight: 59 kg (130 lb)     Height: 4' 11.5\" (151.1 cm)   Body mass index is 25.82 kg/m².    Physical Exam  Vitals and nursing note reviewed.   Constitutional:       General: She is not in acute distress.     Appearance: She is well-developed. She is not diaphoretic.   HENT:      Head: Normocephalic and atraumatic.   Neck:      Vascular: No JVD.   Cardiovascular:      Rate and Rhythm: Normal rate and regular rhythm.      Pulses: Intact distal pulses.           Radial pulses are 2+ on the right side and 2+ on the left side.      Heart sounds: Normal heart sounds, S1 normal and S2 normal. No murmur heard.     Comments: No edema  Pulmonary:      Effort: Pulmonary effort is normal.      Breath sounds: Decreased breath sounds (diffuse) present.      Comments: Supplemental O2  Abdominal:      General: There is no distension.      Palpations: Abdomen is soft.      Tenderness: There is no abdominal tenderness.   Musculoskeletal:         General: Normal range of motion.      Cervical back: Normal range of motion and neck supple.   Skin:     General: Skin is warm and dry.   Neurological:      Mental Status: She is alert and oriented to person, place, and time.      Cranial Nerves: No cranial nerve deficit.   Psychiatric:         Behavior: Behavior normal.                "

## 2025-04-15 NOTE — ASSESSMENT & PLAN NOTE
Blood pressure is acceptable on my recheck  Currently taking amlodipine 5 mg BID and metoprolol succinate 50 mg BID  Previously on lisinopril which was stopped in 2021 for unclear reasons.  Requested labs from PCP

## 2025-04-15 NOTE — PATIENT INSTRUCTIONS
Continue current medications.  Please have the PCP send your upcoming lab results for our records.

## (undated) DEVICE — LAPAROSCOPIC TROCAR SLEEVE/SINGLE USE: Brand: KII® SLEEVE

## (undated) DEVICE — INTENDED FOR TISSUE SEPARATION, AND OTHER PROCEDURES THAT REQUIRE A SHARP SURGICAL BLADE TO PUNCTURE OR CUT.: Brand: BARD-PARKER SAFETY BLADES SIZE 11, STERILE

## (undated) DEVICE — PACK PBDS STERILE LAP LITHOTOMY RF

## (undated) DEVICE — SUT VICRYL 0 UR-6 27 IN J603H

## (undated) DEVICE — GOWN ,SIRUS ,NONREINFORCED 4XL: Brand: MEDLINE

## (undated) DEVICE — SUT VICRYL 0 54 IN J207G

## (undated) DEVICE — CURVED INTRALUMINAL STAPLER (ILS) 28 TITANIUM ADJUSTABLE HEIGHT STAPLES

## (undated) DEVICE — TROCARS: Brand: KII® BALLOON BLUNT TIP SYSTEM

## (undated) DEVICE — CHLORAPREP HI-LITE 26ML ORANGE

## (undated) DEVICE — FIRST STEP BEDSIDE KIT - STAND-UP POUCH, ENDOSCOPIC CLEANING PAD - 1 POUCH: Brand: FIRST STEP BEDSIDE KIT - STAND-UP POUCH, ENDOSCOPIC CLEANING PAD

## (undated) DEVICE — PLUMEPEN PRO 10FT

## (undated) DEVICE — ECHELON FLEX 60 ARTICULATING ENDOSCOPIC LINEAR CUTTER (NO CARTRIDGE): Brand: ECHELON FLEX ENDOPATH

## (undated) DEVICE — UNDER BUTTOCKS DRAPE W/FLUID CONTROL POUCH: Brand: CONVERTORS

## (undated) DEVICE — HARMONIC 1100 SHEARS, 36CM SHAFT LENGTH: Brand: HARMONIC

## (undated) DEVICE — IRRIG ENDO FLO TUBING

## (undated) DEVICE — TROCAR: Brand: KII FIOS FIRST ENTRY

## (undated) DEVICE — ADHESIVE SKIN HIGH VISCOSITY EXOFIN 1ML

## (undated) DEVICE — CO2 AND WATER TUBING/CAP SET FOR OLYMPUS® SCOPES & UCR: Brand: ERBE

## (undated) DEVICE — INSUFLATION TUBING INSUFLOW (LEXION)

## (undated) DEVICE — MAYO STAND COVER: Brand: CONVERTORS

## (undated) DEVICE — WOUND RETRACTOR AND PROTECTOR: Brand: ALEXIS O WOUND PROTECTOR-RETRACTOR

## (undated) DEVICE — VISUALIZATION SYSTEM: Brand: CLEARIFY

## (undated) DEVICE — SUT VICRYL 0 CT-1 18 IN J740D

## (undated) DEVICE — INTENDED FOR TISSUE SEPARATION, AND OTHER PROCEDURES THAT REQUIRE A SHARP SURGICAL BLADE TO PUNCTURE OR CUT.: Brand: BARD-PARKER SAFETY BLADES SIZE 15, STERILE

## (undated) DEVICE — LUBRICANT SURGILUBE TUBE 4 OZ  FLIP TOP

## (undated) DEVICE — GLOVE SRG BIOGEL 8

## (undated) DEVICE — TRAY FOLEY 16FR URIMETER SILICONE SURESTEP

## (undated) DEVICE — TROCAR: Brand: KII® SLEEVE

## (undated) DEVICE — ENDOSCOPIC LINEAR CUTTER RELOADS BLUE 3.5MM: Brand: ECHELON; ENDOPATH

## (undated) DEVICE — SUT MONOCRYL 4-0 PS-2 18 IN Y496G

## (undated) DEVICE — SUT PDS II 3-0 SH 27 IN Z316H

## (undated) DEVICE — 40595 XL TRENDELENBURG POSITIONING KIT: Brand: 40595 XL TRENDELENBURG POSITIONING KIT